# Patient Record
Sex: MALE | Race: WHITE | NOT HISPANIC OR LATINO | Employment: FULL TIME | URBAN - METROPOLITAN AREA
[De-identification: names, ages, dates, MRNs, and addresses within clinical notes are randomized per-mention and may not be internally consistent; named-entity substitution may affect disease eponyms.]

---

## 2020-09-17 ENCOUNTER — TRANSCRIBE ORDERS (OUTPATIENT)
Dept: ADMINISTRATIVE | Facility: HOSPITAL | Age: 70
End: 2020-09-17

## 2020-09-25 ENCOUNTER — HOSPITAL ENCOUNTER (OUTPATIENT)
Dept: INTERVENTIONAL RADIOLOGY/VASCULAR | Facility: HOSPITAL | Age: 70
Discharge: HOME | End: 2020-09-25
Admitting: RADIOLOGY

## 2020-09-25 VITALS — HEIGHT: 61 IN | BODY MASS INDEX: 33.99 KG/M2 | WEIGHT: 180 LBS

## 2020-09-25 DIAGNOSIS — R59.0 INGUINAL LYMPHADENOPATHY: ICD-10-CM

## 2020-09-25 PROCEDURE — 88333 PATH CONSLTJ SURG CYTO XM 1: CPT | Performed by: FAMILY MEDICINE

## 2020-09-25 PROCEDURE — 88307 TISSUE EXAM BY PATHOLOGIST: CPT | Performed by: FAMILY MEDICINE

## 2020-09-25 PROCEDURE — 76942 ECHO GUIDE FOR BIOPSY: CPT

## 2020-09-25 PROCEDURE — 25010000003 LIDOCAINE 1 % SOLUTION: Performed by: RADIOLOGY

## 2020-09-25 RX ORDER — LISINOPRIL 10 MG/1
10 TABLET ORAL DAILY
COMMUNITY
Start: 2020-09-04 | End: 2022-02-13

## 2020-09-25 RX ORDER — FENOFIBRATE 145 MG/1
145 TABLET, COATED ORAL DAILY
COMMUNITY
Start: 2020-09-10

## 2020-09-25 RX ORDER — LIDOCAINE HYDROCHLORIDE 10 MG/ML
INJECTION, SOLUTION INFILTRATION; PERINEURAL
Status: COMPLETED | OUTPATIENT
Start: 2020-09-25 | End: 2020-09-25

## 2020-09-25 RX ADMIN — LIDOCAINE HYDROCHLORIDE 6 ML: 10 INJECTION, SOLUTION INFILTRATION; PERINEURAL at 13:28

## 2020-09-25 NOTE — NURSING NOTE
Pt ambulated into IR lab D for lymph node biopsy in left groin. Pt placed himself onto stretcher in supine position, with HOB approx 20 degrees.

## 2020-09-25 NOTE — NURSING NOTE
Pt changed back into his clothing and was educated on his discharge instructions. Pt was provided paper copy to take home with him today. Pt voiced understanding of all discharge instructions.

## 2020-09-25 NOTE — NURSING NOTE
Local dressing of sterile gauze, betadine, and tegaderm applied to left groin. Dressing is dry and intact.

## 2020-09-25 NOTE — NURSING NOTE
Left groin was draped in sterile fashion and Dr. Martinez is using ultrasound guidance to numb area on left groin for biopsy.

## 2020-09-25 NOTE — NURSING NOTE
Pathologist called to IR lab D and Dr. Martinez is beginning to place guide needle, using ultrasound guidance.

## 2020-09-25 NOTE — NURSING NOTE
Dr. Martinez obtained initial core left groin lymph node biopsy sample for pathologist and placed it in formulin.

## 2020-09-25 NOTE — DISCHARGE INSTRUCTIONS
Needle Biopsy, Care After  These instructions tell you how to care for yourself after your procedure. Your doctor may also give you more specific instructions. Call your doctor if you have any problems or questions.  What can I expect after the procedure?  After the procedure, it is common to have:  · Soreness.  · Bruising.  · Mild pain.  Follow these instructions at home:    · Return to your normal activities after 48 hours.   · Take over-the-counter and prescription medicines as directed.  · Wash your hands with soap and water before you change your bandage (dressing). If you cannot use soap and water, use hand .  · Follow instructions from your doctor about:  ? How to take care of your puncture site.  ? When and how to change your bandage.  ? When to remove your bandage.  ***Leave dressing clean, dry and in place for the next 48 hours, then you may remove and shower as usual. If not completely healed closed, apply new bandaid every other day until healed closed. *** You may apply an ice pack 20 minutes on, then 20 minutes off for any discomfort.   · Check your puncture site every day for signs of infection. Watch for:  ? Redness, swelling, or pain.  ? Fluid or blood.   ? Pus or a bad smell.  ? Warmth.  · Do not take baths, swim, or use a hot tub for the next 48 hours.    · Keep all follow-up visits as told by your doctor. This is important.  Contact a doctor if you have:  · A fever.  · Redness, swelling, or pain at the puncture site, and it lasts longer than a few days.  · Fluid, blood, or pus coming from the puncture site.  · Warmth coming from the puncture site.  Get help right away if:  · You have a lot of bleeding from the puncture site.  Summary  · After the procedure, it is common to have soreness, bruising, or mild pain at the puncture site.  · Check your puncture site every day for signs of infection, such as redness, swelling, or pain.  · Get help right away if you have severe bleeding from your  puncture site.  This information is not intended to replace advice given to you by your health care provider. Make sure you discuss any questions you have with your health care provider.  Document Released: 11/30/2009 Document Revised: 12/31/2018 Document Reviewed: 12/31/2018  Elsevier Patient Education © 2020 [employer]

## 2020-09-25 NOTE — NURSING NOTE
Pt was discharged home in stable condition on room air. Pt ambulated from dept without difficulty and met his wife in the waiting room.

## 2020-10-01 LAB
LAB AP CASE REPORT: NORMAL
LAB AP DIAGNOSIS COMMENT: NORMAL
LAB AP FLOW CYTOMETRY SUMMARY: NORMAL
LAB AP INTEGRATED ONCOLOGY, ADDENDUM: NORMAL
Lab: NORMAL
PATH REPORT.FINAL DX SPEC: NORMAL
PATH REPORT.GROSS SPEC: NORMAL

## 2020-10-24 ENCOUNTER — TRANSCRIBE ORDERS (OUTPATIENT)
Dept: ADMINISTRATIVE | Facility: HOSPITAL | Age: 70
End: 2020-10-24

## 2020-10-24 ENCOUNTER — LAB (OUTPATIENT)
Dept: LAB | Facility: HOSPITAL | Age: 70
End: 2020-10-24

## 2020-10-24 ENCOUNTER — HOSPITAL ENCOUNTER (OUTPATIENT)
Dept: CARDIOLOGY | Facility: HOSPITAL | Age: 70
Discharge: HOME | End: 2020-10-24

## 2020-10-24 DIAGNOSIS — C85.95 NON-HODGKIN'S LYMPHOMA OF INGUINAL REGION (HCC): ICD-10-CM

## 2020-10-24 DIAGNOSIS — C85.95 NON-HODGKIN'S LYMPHOMA OF INGUINAL REGION (HCC): Primary | ICD-10-CM

## 2020-10-24 LAB
ANION GAP SERPL CALCULATED.3IONS-SCNC: 9.3 MMOL/L (ref 5–15)
BUN SERPL-MCNC: 28 MG/DL (ref 8–23)
BUN/CREAT SERPL: 21.5 (ref 7–25)
CALCIUM SPEC-SCNC: 9.8 MG/DL (ref 8.6–10.5)
CHLORIDE SERPL-SCNC: 106 MMOL/L (ref 98–107)
CO2 SERPL-SCNC: 26.7 MMOL/L (ref 22–29)
CREAT SERPL-MCNC: 1.3 MG/DL (ref 0.76–1.27)
GFR SERPL CREATININE-BSD FRML MDRD: 55 ML/MIN/1.73
GLUCOSE SERPL-MCNC: 98 MG/DL (ref 65–99)
POTASSIUM SERPL-SCNC: 5.7 MMOL/L (ref 3.5–5.2)
SODIUM SERPL-SCNC: 142 MMOL/L (ref 136–145)

## 2020-10-24 PROCEDURE — 93010 ELECTROCARDIOGRAM REPORT: CPT | Performed by: INTERNAL MEDICINE

## 2020-10-24 PROCEDURE — 36415 COLL VENOUS BLD VENIPUNCTURE: CPT

## 2020-10-24 PROCEDURE — 93005 ELECTROCARDIOGRAM TRACING: CPT | Performed by: ANESTHESIOLOGY

## 2020-10-24 PROCEDURE — 80048 BASIC METABOLIC PNL TOTAL CA: CPT

## 2021-11-19 ENCOUNTER — TRANSCRIBE ORDERS (OUTPATIENT)
Dept: ADMINISTRATIVE | Facility: HOSPITAL | Age: 71
End: 2021-11-19

## 2021-11-19 DIAGNOSIS — I10 HYPERTENSION, ESSENTIAL: ICD-10-CM

## 2021-11-19 DIAGNOSIS — R80.9 PROTEINURIA, UNSPECIFIED TYPE: Primary | ICD-10-CM

## 2021-11-19 DIAGNOSIS — N18.32 CHRONIC KIDNEY DISEASE (CKD) STAGE G3B/A1, MODERATELY DECREASED GLOMERULAR FILTRATION RATE (GFR) BETWEEN 30-44 ML/MIN/1.73 SQUARE METER AND ALBUMINURIA CREATININE RATIO LESS THAN 30 MG/G (CMS/H* (HCC): ICD-10-CM

## 2021-12-06 ENCOUNTER — HOSPITAL ENCOUNTER (OUTPATIENT)
Dept: ULTRASOUND IMAGING | Facility: HOSPITAL | Age: 71
Discharge: HOME | End: 2021-12-06

## 2021-12-06 ENCOUNTER — LAB (OUTPATIENT)
Dept: LAB | Facility: HOSPITAL | Age: 71
End: 2021-12-06

## 2021-12-06 ENCOUNTER — TRANSCRIBE ORDERS (OUTPATIENT)
Dept: ADMINISTRATIVE | Facility: HOSPITAL | Age: 71
End: 2021-12-06

## 2021-12-06 DIAGNOSIS — N18.30 STAGE 3 CHRONIC KIDNEY DISEASE, UNSPECIFIED WHETHER STAGE 3A OR 3B CKD (HCC): Primary | ICD-10-CM

## 2021-12-06 DIAGNOSIS — I10 HYPERTENSION, ESSENTIAL: ICD-10-CM

## 2021-12-06 DIAGNOSIS — N18.30 STAGE 3 CHRONIC KIDNEY DISEASE, UNSPECIFIED WHETHER STAGE 3A OR 3B CKD (HCC): ICD-10-CM

## 2021-12-06 DIAGNOSIS — N18.32 CHRONIC KIDNEY DISEASE (CKD) STAGE G3B/A1, MODERATELY DECREASED GLOMERULAR FILTRATION RATE (GFR) BETWEEN 30-44 ML/MIN/1.73 SQUARE METER AND ALBUMINURIA CREATININE RATIO LESS THAN 30 MG/G (CMS/H* (HCC): ICD-10-CM

## 2021-12-06 DIAGNOSIS — E55.9 VITAMIN D DEFICIENCY, UNSPECIFIED: ICD-10-CM

## 2021-12-06 DIAGNOSIS — R80.9 PROTEINURIA, UNSPECIFIED TYPE: ICD-10-CM

## 2021-12-06 DIAGNOSIS — E03.9 HYPOTHYROIDISM, UNSPECIFIED TYPE: ICD-10-CM

## 2021-12-06 LAB
25(OH)D3 SERPL-MCNC: 46.8 NG/ML
ALBUMIN SERPL-MCNC: 4.7 G/DL (ref 3.5–5.2)
ALBUMIN/GLOB SERPL: 2 G/DL
ALP SERPL-CCNC: 43 U/L (ref 39–117)
ALT SERPL W P-5'-P-CCNC: 25 U/L (ref 1–41)
ANION GAP SERPL CALCULATED.3IONS-SCNC: 12.2 MMOL/L (ref 5–15)
AST SERPL-CCNC: 26 U/L (ref 1–40)
BACTERIA UR QL AUTO: NORMAL /HPF
BASOPHILS # BLD AUTO: 0.02 10*3/MM3 (ref 0–0.2)
BASOPHILS NFR BLD AUTO: 0.5 % (ref 0–1.5)
BILIRUB SERPL-MCNC: 0.3 MG/DL (ref 0–1.2)
BILIRUB UR QL STRIP: NEGATIVE
BUN SERPL-MCNC: 18 MG/DL (ref 8–23)
BUN/CREAT SERPL: 13.7 (ref 7–25)
CALCIUM SPEC-SCNC: 9.9 MG/DL (ref 8.6–10.5)
CHLORIDE SERPL-SCNC: 103 MMOL/L (ref 98–107)
CK SERPL-CCNC: 99 U/L (ref 20–200)
CLARITY UR: CLEAR
CO2 SERPL-SCNC: 26.8 MMOL/L (ref 22–29)
COLOR UR: YELLOW
CREAT SERPL-MCNC: 1.31 MG/DL (ref 0.76–1.27)
CREAT UR-MCNC: 32.6 MG/DL
DEPRECATED RDW RBC AUTO: 41.3 FL (ref 37–54)
EOSINOPHIL # BLD AUTO: 0.08 10*3/MM3 (ref 0–0.4)
EOSINOPHIL NFR BLD AUTO: 2 % (ref 0.3–6.2)
ERYTHROCYTE [DISTWIDTH] IN BLOOD BY AUTOMATED COUNT: 12.5 % (ref 12.3–15.4)
GFR SERPL CREATININE-BSD FRML MDRD: 54 ML/MIN/1.73
GLOBULIN UR ELPH-MCNC: 2.4 GM/DL
GLUCOSE SERPL-MCNC: 83 MG/DL (ref 65–99)
GLUCOSE UR STRIP-MCNC: NEGATIVE MG/DL
HCT VFR BLD AUTO: 34.6 % (ref 37.5–51)
HGB BLD-MCNC: 11.2 G/DL (ref 13–17.7)
HGB UR QL STRIP.AUTO: ABNORMAL
HYALINE CASTS UR QL AUTO: NORMAL /LPF
IMM GRANULOCYTES # BLD AUTO: 0.05 10*3/MM3 (ref 0–0.05)
IMM GRANULOCYTES NFR BLD AUTO: 1.3 % (ref 0–0.5)
KETONES UR QL STRIP: NEGATIVE
LEUKOCYTE ESTERASE UR QL STRIP.AUTO: NEGATIVE
LYMPHOCYTES # BLD AUTO: 0.29 10*3/MM3 (ref 0.7–3.1)
LYMPHOCYTES NFR BLD AUTO: 7.4 % (ref 19.6–45.3)
MCH RBC QN AUTO: 29.6 PG (ref 26.6–33)
MCHC RBC AUTO-ENTMCNC: 32.4 G/DL (ref 31.5–35.7)
MCV RBC AUTO: 91.3 FL (ref 79–97)
MONOCYTES # BLD AUTO: 0.69 10*3/MM3 (ref 0.1–0.9)
MONOCYTES NFR BLD AUTO: 17.6 % (ref 5–12)
NEUTROPHILS NFR BLD AUTO: 2.8 10*3/MM3 (ref 1.7–7)
NEUTROPHILS NFR BLD AUTO: 71.2 % (ref 42.7–76)
NITRITE UR QL STRIP: NEGATIVE
NRBC BLD AUTO-RTO: 0 /100 WBC (ref 0–0.2)
PH UR STRIP.AUTO: 6.5 [PH] (ref 5–8)
PHOSPHATE SERPL-MCNC: 3.5 MG/DL (ref 2.5–4.5)
PLATELET # BLD AUTO: 269 10*3/MM3 (ref 140–450)
PMV BLD AUTO: 9.8 FL (ref 6–12)
POTASSIUM SERPL-SCNC: 4 MMOL/L (ref 3.5–5.2)
PROT ?TM UR-MCNC: <4 MG/DL
PROT SERPL-MCNC: 7.1 G/DL (ref 6–8.5)
PROT UR QL STRIP: NEGATIVE
PROT/CREAT UR: NORMAL MG/G{CREAT}
PTH-INTACT SERPL-MCNC: 25.4 PG/ML (ref 15–65)
RBC # BLD AUTO: 3.79 10*6/MM3 (ref 4.14–5.8)
RBC # UR STRIP: NORMAL /HPF
REF LAB TEST METHOD: NORMAL
SODIUM SERPL-SCNC: 142 MMOL/L (ref 136–145)
SP GR UR STRIP: 1.01 (ref 1–1.03)
SQUAMOUS #/AREA URNS HPF: NORMAL /HPF
TSH SERPL DL<=0.05 MIU/L-ACNC: 2.38 UIU/ML (ref 0.27–4.2)
URATE SERPL-MCNC: 5.7 MG/DL (ref 3.4–7)
UROBILINOGEN UR QL STRIP: ABNORMAL
WBC # UR STRIP: NORMAL /HPF
WBC NRBC COR # BLD: 3.93 10*3/MM3 (ref 3.4–10.8)

## 2021-12-06 PROCEDURE — 86038 ANTINUCLEAR ANTIBODIES: CPT

## 2021-12-06 PROCEDURE — 83970 ASSAY OF PARATHORMONE: CPT

## 2021-12-06 PROCEDURE — 76775 US EXAM ABDO BACK WALL LIM: CPT

## 2021-12-06 PROCEDURE — 82306 VITAMIN D 25 HYDROXY: CPT

## 2021-12-06 PROCEDURE — 80053 COMPREHEN METABOLIC PANEL: CPT

## 2021-12-06 PROCEDURE — 84550 ASSAY OF BLOOD/URIC ACID: CPT

## 2021-12-06 PROCEDURE — 82570 ASSAY OF URINE CREATININE: CPT

## 2021-12-06 PROCEDURE — 84100 ASSAY OF PHOSPHORUS: CPT

## 2021-12-06 PROCEDURE — 36415 COLL VENOUS BLD VENIPUNCTURE: CPT

## 2021-12-06 PROCEDURE — 82550 ASSAY OF CK (CPK): CPT

## 2021-12-06 PROCEDURE — 86334 IMMUNOFIX E-PHORESIS SERUM: CPT

## 2021-12-06 PROCEDURE — 82784 ASSAY IGA/IGD/IGG/IGM EACH: CPT

## 2021-12-06 PROCEDURE — 84443 ASSAY THYROID STIM HORMONE: CPT

## 2021-12-06 PROCEDURE — 84156 ASSAY OF PROTEIN URINE: CPT

## 2021-12-06 PROCEDURE — 81001 URINALYSIS AUTO W/SCOPE: CPT

## 2021-12-06 PROCEDURE — 85025 COMPLETE CBC W/AUTO DIFF WBC: CPT

## 2021-12-07 LAB
IGA SERPL-MCNC: 40 MG/DL (ref 61–437)
IGG SERPL-MCNC: 599 MG/DL (ref 603–1613)
IGM SERPL-MCNC: 12 MG/DL (ref 20–172)
PROT PATTERN SERPL IFE-IMP: ABNORMAL

## 2021-12-08 LAB — ANA SER QL: NEGATIVE

## 2022-02-13 ENCOUNTER — APPOINTMENT (OUTPATIENT)
Dept: GENERAL RADIOLOGY | Facility: HOSPITAL | Age: 72
End: 2022-02-13

## 2022-02-13 ENCOUNTER — HOSPITAL ENCOUNTER (INPATIENT)
Facility: HOSPITAL | Age: 72
LOS: 3 days | Discharge: HOME | End: 2022-02-16
Attending: EMERGENCY MEDICINE | Admitting: INTERNAL MEDICINE

## 2022-02-13 DIAGNOSIS — J12.82 PNEUMONIA DUE TO COVID-19 VIRUS: ICD-10-CM

## 2022-02-13 DIAGNOSIS — R06.03 ACUTE RESPIRATORY DISTRESS: Primary | ICD-10-CM

## 2022-02-13 DIAGNOSIS — U07.1 PNEUMONIA DUE TO COVID-19 VIRUS: ICD-10-CM

## 2022-02-13 DIAGNOSIS — R09.02 HYPOXIA: ICD-10-CM

## 2022-02-13 LAB
ALBUMIN SERPL-MCNC: 3.2 G/DL (ref 3.5–5.2)
ALBUMIN SERPL-MCNC: 3.2 G/DL (ref 3.5–5.2)
ALBUMIN/GLOB SERPL: 1.1 G/DL
ALP SERPL-CCNC: 35 U/L (ref 39–117)
ALP SERPL-CCNC: 38 U/L (ref 39–117)
ALT SERPL W P-5'-P-CCNC: 24 U/L (ref 1–41)
ALT SERPL W P-5'-P-CCNC: 28 U/L (ref 1–41)
ANION GAP SERPL CALCULATED.3IONS-SCNC: 12 MMOL/L (ref 5–15)
AST SERPL-CCNC: 38 U/L (ref 1–40)
AST SERPL-CCNC: 41 U/L (ref 1–40)
BACTERIA UR QL AUTO: ABNORMAL /HPF
BASOPHILS # BLD AUTO: 0 10*3/MM3 (ref 0–0.2)
BASOPHILS NFR BLD AUTO: 0.1 % (ref 0–1.5)
BILIRUB CONJ SERPL-MCNC: 0.2 MG/DL (ref 0–0.3)
BILIRUB INDIRECT SERPL-MCNC: 0.1 MG/DL
BILIRUB SERPL-MCNC: 0.3 MG/DL (ref 0–1.2)
BILIRUB SERPL-MCNC: 0.3 MG/DL (ref 0–1.2)
BILIRUB UR QL STRIP: NEGATIVE
BUN SERPL-MCNC: 17 MG/DL (ref 8–23)
BUN/CREAT SERPL: 14.4 (ref 7–25)
CALCIUM SPEC-SCNC: 9 MG/DL (ref 8.6–10.5)
CHLORIDE SERPL-SCNC: 104 MMOL/L (ref 98–107)
CLARITY UR: CLEAR
CO2 SERPL-SCNC: 23 MMOL/L (ref 22–29)
COLOR UR: YELLOW
CREAT SERPL-MCNC: 1.03 MG/DL (ref 0.76–1.27)
CREAT SERPL-MCNC: 1.18 MG/DL (ref 0.76–1.27)
D-LACTATE SERPL-SCNC: 0.9 MMOL/L (ref 0.5–2)
DEPRECATED RDW RBC AUTO: 38.9 FL (ref 37–54)
EOSINOPHIL # BLD AUTO: 0 10*3/MM3 (ref 0–0.4)
EOSINOPHIL NFR BLD AUTO: 0.4 % (ref 0.3–6.2)
ERYTHROCYTE [DISTWIDTH] IN BLOOD BY AUTOMATED COUNT: 13.1 % (ref 12.3–15.4)
GFR SERPL CREATININE-BSD FRML MDRD: 61 ML/MIN/1.73
GFR SERPL CREATININE-BSD FRML MDRD: 72 ML/MIN/1.73
GLOBULIN UR ELPH-MCNC: 2.8 GM/DL
GLUCOSE SERPL-MCNC: 108 MG/DL (ref 65–99)
GLUCOSE UR STRIP-MCNC: NEGATIVE MG/DL
HCT VFR BLD AUTO: 28.6 % (ref 37.5–51)
HGB BLD-MCNC: 9.7 G/DL (ref 13–17.7)
HGB UR QL STRIP.AUTO: ABNORMAL
HYALINE CASTS UR QL AUTO: ABNORMAL /LPF
KETONES UR QL STRIP: NEGATIVE
LEUKOCYTE ESTERASE UR QL STRIP.AUTO: NEGATIVE
LYMPHOCYTES # BLD AUTO: 0.1 10*3/MM3 (ref 0.7–3.1)
LYMPHOCYTES NFR BLD AUTO: 4.3 % (ref 19.6–45.3)
MCH RBC QN AUTO: 28.7 PG (ref 26.6–33)
MCHC RBC AUTO-ENTMCNC: 34.1 G/DL (ref 31.5–35.7)
MCV RBC AUTO: 84.1 FL (ref 79–97)
MONOCYTES # BLD AUTO: 0.3 10*3/MM3 (ref 0.1–0.9)
MONOCYTES NFR BLD AUTO: 10.9 % (ref 5–12)
NEUTROPHILS NFR BLD AUTO: 2.3 10*3/MM3 (ref 1.7–7)
NEUTROPHILS NFR BLD AUTO: 84.3 % (ref 42.7–76)
NITRITE UR QL STRIP: NEGATIVE
NRBC BLD AUTO-RTO: 0 /100 WBC (ref 0–0.2)
PH UR STRIP.AUTO: 6.5 [PH] (ref 5–8)
PLATELET # BLD AUTO: 267 10*3/MM3 (ref 140–450)
PMV BLD AUTO: 7.3 FL (ref 6–12)
POTASSIUM SERPL-SCNC: 3.3 MMOL/L (ref 3.5–5.2)
PROT SERPL-MCNC: 5.8 G/DL (ref 6–8.5)
PROT SERPL-MCNC: 6 G/DL (ref 6–8.5)
PROT UR QL STRIP: ABNORMAL
RBC # BLD AUTO: 3.4 10*6/MM3 (ref 4.14–5.8)
RBC # UR STRIP: ABNORMAL /HPF
REF LAB TEST METHOD: ABNORMAL
SODIUM SERPL-SCNC: 139 MMOL/L (ref 136–145)
SP GR UR STRIP: 1.02 (ref 1–1.03)
SQUAMOUS #/AREA URNS HPF: ABNORMAL /HPF
TROPONIN T SERPL-MCNC: <0.01 NG/ML (ref 0–0.03)
UROBILINOGEN UR QL STRIP: ABNORMAL
WBC # UR STRIP: ABNORMAL /HPF
WBC NRBC COR # BLD: 2.8 10*3/MM3 (ref 3.4–10.8)

## 2022-02-13 PROCEDURE — XW033E5 INTRODUCTION OF REMDESIVIR ANTI-INFECTIVE INTO PERIPHERAL VEIN, PERCUTANEOUS APPROACH, NEW TECHNOLOGY GROUP 5: ICD-10-PCS | Performed by: INTERNAL MEDICINE

## 2022-02-13 PROCEDURE — 81001 URINALYSIS AUTO W/SCOPE: CPT | Performed by: EMERGENCY MEDICINE

## 2022-02-13 PROCEDURE — 25010000002 DEXAMETHASONE PER 1 MG: Performed by: EMERGENCY MEDICINE

## 2022-02-13 PROCEDURE — 80053 COMPREHEN METABOLIC PANEL: CPT | Performed by: EMERGENCY MEDICINE

## 2022-02-13 PROCEDURE — 71045 X-RAY EXAM CHEST 1 VIEW: CPT

## 2022-02-13 PROCEDURE — 99284 EMERGENCY DEPT VISIT MOD MDM: CPT

## 2022-02-13 PROCEDURE — 85025 COMPLETE CBC W/AUTO DIFF WBC: CPT | Performed by: EMERGENCY MEDICINE

## 2022-02-13 PROCEDURE — 87040 BLOOD CULTURE FOR BACTERIA: CPT | Performed by: EMERGENCY MEDICINE

## 2022-02-13 PROCEDURE — 25010000002 ENOXAPARIN PER 10 MG: Performed by: INTERNAL MEDICINE

## 2022-02-13 PROCEDURE — 82565 ASSAY OF CREATININE: CPT | Performed by: INTERNAL MEDICINE

## 2022-02-13 PROCEDURE — 93005 ELECTROCARDIOGRAM TRACING: CPT | Performed by: EMERGENCY MEDICINE

## 2022-02-13 PROCEDURE — 25010000002 REMDESIVIR 100 MG RECONSTITUTED SOLUTION: Performed by: INTERNAL MEDICINE

## 2022-02-13 PROCEDURE — 84484 ASSAY OF TROPONIN QUANT: CPT | Performed by: EMERGENCY MEDICINE

## 2022-02-13 PROCEDURE — 82248 BILIRUBIN DIRECT: CPT | Performed by: EMERGENCY MEDICINE

## 2022-02-13 PROCEDURE — 83605 ASSAY OF LACTIC ACID: CPT

## 2022-02-13 PROCEDURE — 36415 COLL VENOUS BLD VENIPUNCTURE: CPT | Performed by: INTERNAL MEDICINE

## 2022-02-13 RX ORDER — ACETAMINOPHEN 325 MG/1
650 TABLET ORAL EVERY 4 HOURS PRN
Status: DISCONTINUED | OUTPATIENT
Start: 2022-02-13 | End: 2022-02-16 | Stop reason: HOSPADM

## 2022-02-13 RX ORDER — POTASSIUM CHLORIDE 20 MEQ/1
40 TABLET, EXTENDED RELEASE ORAL AS NEEDED
Status: DISCONTINUED | OUTPATIENT
Start: 2022-02-13 | End: 2022-02-16 | Stop reason: HOSPADM

## 2022-02-13 RX ORDER — FINASTERIDE 5 MG/1
5 TABLET, FILM COATED ORAL NIGHTLY
COMMUNITY

## 2022-02-13 RX ORDER — SODIUM CHLORIDE 0.9 % (FLUSH) 0.9 %
3 SYRINGE (ML) INJECTION EVERY 12 HOURS SCHEDULED
Status: DISCONTINUED | OUTPATIENT
Start: 2022-02-13 | End: 2022-02-16 | Stop reason: HOSPADM

## 2022-02-13 RX ORDER — HYDRALAZINE HYDROCHLORIDE 25 MG/1
25 TABLET, FILM COATED ORAL 2 TIMES DAILY
COMMUNITY
Start: 2021-11-17

## 2022-02-13 RX ORDER — LISINOPRIL 5 MG/1
10 TABLET ORAL DAILY
Status: DISCONTINUED | OUTPATIENT
Start: 2022-02-13 | End: 2022-02-13

## 2022-02-13 RX ORDER — SODIUM CHLORIDE 0.9 % (FLUSH) 0.9 %
3-10 SYRINGE (ML) INJECTION AS NEEDED
Status: DISCONTINUED | OUTPATIENT
Start: 2022-02-13 | End: 2022-02-16 | Stop reason: HOSPADM

## 2022-02-13 RX ORDER — ACETAMINOPHEN 500 MG
1000 TABLET ORAL ONCE
Status: COMPLETED | OUTPATIENT
Start: 2022-02-13 | End: 2022-02-13

## 2022-02-13 RX ORDER — BUDESONIDE AND FORMOTEROL FUMARATE DIHYDRATE 160; 4.5 UG/1; UG/1
2 AEROSOL RESPIRATORY (INHALATION)
Status: DISCONTINUED | OUTPATIENT
Start: 2022-02-13 | End: 2022-02-16 | Stop reason: HOSPADM

## 2022-02-13 RX ORDER — POTASSIUM CHLORIDE 1.5 G/1.77G
40 POWDER, FOR SOLUTION ORAL AS NEEDED
Status: DISCONTINUED | OUTPATIENT
Start: 2022-02-13 | End: 2022-02-16 | Stop reason: HOSPADM

## 2022-02-13 RX ORDER — ONDANSETRON 2 MG/ML
4 INJECTION INTRAMUSCULAR; INTRAVENOUS EVERY 6 HOURS PRN
Status: DISCONTINUED | OUTPATIENT
Start: 2022-02-13 | End: 2022-02-16 | Stop reason: HOSPADM

## 2022-02-13 RX ORDER — FAMOTIDINE 20 MG/1
40 TABLET, FILM COATED ORAL DAILY
Status: DISCONTINUED | OUTPATIENT
Start: 2022-02-13 | End: 2022-02-14

## 2022-02-13 RX ORDER — GUAIFENESIN 600 MG/1
600 TABLET, EXTENDED RELEASE ORAL EVERY 12 HOURS SCHEDULED
Status: DISCONTINUED | OUTPATIENT
Start: 2022-02-13 | End: 2022-02-16 | Stop reason: HOSPADM

## 2022-02-13 RX ORDER — ZINC SULFATE 50(220)MG
220 CAPSULE ORAL DAILY
Status: DISCONTINUED | OUTPATIENT
Start: 2022-02-13 | End: 2022-02-16 | Stop reason: HOSPADM

## 2022-02-13 RX ORDER — DEXAMETHASONE 6 MG/1
6 TABLET ORAL
Status: DISCONTINUED | OUTPATIENT
Start: 2022-02-14 | End: 2022-02-15

## 2022-02-13 RX ORDER — ALBUTEROL SULFATE 90 UG/1
2 AEROSOL, METERED RESPIRATORY (INHALATION)
Status: DISCONTINUED | OUTPATIENT
Start: 2022-02-13 | End: 2022-02-16 | Stop reason: HOSPADM

## 2022-02-13 RX ORDER — DEXAMETHASONE SODIUM PHOSPHATE 4 MG/ML
6 INJECTION, SOLUTION INTRA-ARTICULAR; INTRALESIONAL; INTRAMUSCULAR; INTRAVENOUS; SOFT TISSUE ONCE
Status: COMPLETED | OUTPATIENT
Start: 2022-02-13 | End: 2022-02-13

## 2022-02-13 RX ORDER — SODIUM CHLORIDE 0.9 % (FLUSH) 0.9 %
10 SYRINGE (ML) INJECTION AS NEEDED
Status: DISCONTINUED | OUTPATIENT
Start: 2022-02-13 | End: 2022-02-16 | Stop reason: HOSPADM

## 2022-02-13 RX ORDER — NITROGLYCERIN 0.4 MG/1
0.4 TABLET SUBLINGUAL
Status: DISCONTINUED | OUTPATIENT
Start: 2022-02-13 | End: 2022-02-16 | Stop reason: HOSPADM

## 2022-02-13 RX ORDER — TAMSULOSIN HYDROCHLORIDE 0.4 MG/1
CAPSULE ORAL
COMMUNITY
Start: 2021-11-17 | End: 2022-02-13

## 2022-02-13 RX ADMIN — ENOXAPARIN SODIUM 40 MG: 40 INJECTION SUBCUTANEOUS at 17:57

## 2022-02-13 RX ADMIN — ZINC SULFATE 220 MG (50 MG) CAPSULE 220 MG: CAPSULE at 22:05

## 2022-02-13 RX ADMIN — POTASSIUM CHLORIDE 40 MEQ: 1500 TABLET, EXTENDED RELEASE ORAL at 17:57

## 2022-02-13 RX ADMIN — SODIUM CHLORIDE 1000 ML: 9 INJECTION, SOLUTION INTRAVENOUS at 15:48

## 2022-02-13 RX ADMIN — DEXAMETHASONE SODIUM PHOSPHATE 6 MG: 4 INJECTION, SOLUTION INTRAMUSCULAR; INTRAVENOUS at 16:32

## 2022-02-13 RX ADMIN — FAMOTIDINE 40 MG: 20 TABLET ORAL at 17:58

## 2022-02-13 RX ADMIN — Medication 3 ML: at 23:22

## 2022-02-13 RX ADMIN — ACETAMINOPHEN 1000 MG: 500 TABLET, FILM COATED ORAL at 15:48

## 2022-02-13 RX ADMIN — GUAIFENESIN 600 MG: 600 TABLET, EXTENDED RELEASE ORAL at 22:05

## 2022-02-13 RX ADMIN — REMDESIVIR 200 MG: 100 INJECTION, POWDER, LYOPHILIZED, FOR SOLUTION INTRAVENOUS at 17:58

## 2022-02-13 NOTE — ED PROVIDER NOTES
Subjective   Chief complaint Short of breath Covid positive    History of present is a 71-year-old male with history of non-Hodgkin's lymphoma who states that he tested positive for Covid this past Wednesday and he has been having some cough congestion fever chills and increasing shortness of breath since that time.  He denies any chest pain he is got body aches.  No vomiting or diarrhea.  Patient is vaccinated including a booster.  Symptoms are severe worse with exertion better with rest ongoing continues since Wednesday.  No other complaints at this time           Review of Systems   Constitutional: Positive for chills and fever.   HENT: Positive for congestion. Negative for sinus pressure.    Eyes: Negative for photophobia and visual disturbance.   Respiratory: Positive for cough and shortness of breath. Negative for chest tightness.    Cardiovascular: Negative for chest pain and palpitations.   Gastrointestinal: Negative for abdominal pain and vomiting.   Endocrine: Negative for cold intolerance and heat intolerance.   Genitourinary: Negative for difficulty urinating and dysuria.   Musculoskeletal: Positive for myalgias. Negative for arthralgias.   Skin: Negative for color change and rash.   Neurological: Positive for weakness. Negative for dizziness.   Psychiatric/Behavioral: Negative for agitation and behavioral problems.       Past Medical History:   Diagnosis Date   • Hyperlipidemia    • Hypertension      Non-Hodgkin's lymphoma  No Known Allergies    Past Surgical History:   Procedure Laterality Date   • APPENDECTOMY     • US GUIDED LYMPH NODE BIOPSY  9/25/2020   • VASECTOMY Bilateral        No family history on file.    Social History     Socioeconomic History   • Marital status:    Tobacco Use   • Smoking status: Never Smoker   Substance and Sexual Activity   • Alcohol use: Yes   • Drug use: Never   • Sexual activity: Defer     Prior to Admission medications    Medication Sig Start Date End Date  Taking? Authorizing Provider   fenofibrate (TRICOR) 145 MG tablet Take 1 tablet by mouth Daily. 9/10/20   Rocael Robbins MD   lisinopril (PRINIVIL,ZESTRIL) 10 MG tablet Take 10 mg by mouth Daily. 9/4/20   Roceal Robbins MD           Objective   Physical Exam  71-year-old male awake alert moderate distress but nontoxic-appearing room air saturations anywhere between 88% and 90% on room air.  Patient placed on 2 L nasal cannula improved sats up to 98%.  HEENT extraocular muscles intact pupils equal round reactive sclera clear mouth clear neck supple no adenopathy no JVD no meningeal signs lungs diffuse rhonchi throughout no retraction no use of accessory heart regular slight tachycardic without murmur abdomen was soft without tenderness no other masses good bowel sounds extremities pulses equal throughout upper and lower extremities no edema cords or Homans' sign no evidence of DVT skin warm dry without rashes or cellulitic changes neurologic he is awake alert orientated x3 is no facial symmetry speech is normal he said occasionally conversation is no focal deficits.  Procedures           ED Course      Results for orders placed or performed during the hospital encounter of 02/13/22   Comprehensive Metabolic Panel    Specimen: Blood   Result Value Ref Range    Glucose 108 (H) 65 - 99 mg/dL    BUN 17 8 - 23 mg/dL    Creatinine 1.18 0.76 - 1.27 mg/dL    Sodium 139 136 - 145 mmol/L    Potassium 3.3 (L) 3.5 - 5.2 mmol/L    Chloride 104 98 - 107 mmol/L    CO2 23.0 22.0 - 29.0 mmol/L    Calcium 9.0 8.6 - 10.5 mg/dL    Total Protein 6.0 6.0 - 8.5 g/dL    Albumin 3.20 (L) 3.50 - 5.20 g/dL    ALT (SGPT) 28 1 - 41 U/L    AST (SGOT) 41 (H) 1 - 40 U/L    Alkaline Phosphatase 38 (L) 39 - 117 U/L    Total Bilirubin 0.3 0.0 - 1.2 mg/dL    eGFR Non African Amer 61 >60 mL/min/1.73    Globulin 2.8 gm/dL    A/G Ratio 1.1 g/dL    BUN/Creatinine Ratio 14.4 7.0 - 25.0    Anion Gap 12.0 5.0 - 15.0 mmol/L   Urinalysis With  Microscopic If Indicated (No Culture) - Urine, Clean Catch    Specimen: Urine, Clean Catch   Result Value Ref Range    Color, UA Yellow Yellow, Straw    Appearance, UA Clear Clear    pH, UA 6.5 5.0 - 8.0    Specific Gravity, UA 1.016 1.005 - 1.030    Glucose, UA Negative Negative    Ketones, UA Negative Negative    Bilirubin, UA Negative Negative    Blood, UA Small (1+) (A) Negative    Protein, UA 30 mg/dL (1+) (A) Negative    Leuk Esterase, UA Negative Negative    Nitrite, UA Negative Negative    Urobilinogen, UA 1.0 E.U./dL 0.2 - 1.0 E.U./dL   Troponin    Specimen: Blood   Result Value Ref Range    Troponin T <0.010 0.000 - 0.030 ng/mL   CBC Auto Differential    Specimen: Blood   Result Value Ref Range    WBC 2.80 (L) 3.40 - 10.80 10*3/mm3    RBC 3.40 (L) 4.14 - 5.80 10*6/mm3    Hemoglobin 9.7 (L) 13.0 - 17.7 g/dL    Hematocrit 28.6 (L) 37.5 - 51.0 %    MCV 84.1 79.0 - 97.0 fL    MCH 28.7 26.6 - 33.0 pg    MCHC 34.1 31.5 - 35.7 g/dL    RDW 13.1 12.3 - 15.4 %    RDW-SD 38.9 37.0 - 54.0 fl    MPV 7.3 6.0 - 12.0 fL    Platelets 267 140 - 450 10*3/mm3    Neutrophil % 84.3 (H) 42.7 - 76.0 %    Lymphocyte % 4.3 (L) 19.6 - 45.3 %    Monocyte % 10.9 5.0 - 12.0 %    Eosinophil % 0.4 0.3 - 6.2 %    Basophil % 0.1 0.0 - 1.5 %    Neutrophils, Absolute 2.30 1.70 - 7.00 10*3/mm3    Lymphocytes, Absolute 0.10 (L) 0.70 - 3.10 10*3/mm3    Monocytes, Absolute 0.30 0.10 - 0.90 10*3/mm3    Eosinophils, Absolute 0.00 0.00 - 0.40 10*3/mm3    Basophils, Absolute 0.00 0.00 - 0.20 10*3/mm3    nRBC 0.0 0.0 - 0.2 /100 WBC   Urinalysis, Microscopic Only - Urine, Clean Catch    Specimen: Urine, Clean Catch   Result Value Ref Range    RBC, UA 13-20 (A) None Seen /HPF    WBC, UA 0-2 (A) None Seen /HPF    Bacteria, UA None Seen None Seen /HPF    Squamous Epithelial Cells, UA 0-2 None Seen, 0-2 /HPF    Hyaline Casts, UA None Seen None Seen /LPF    Methodology Automated Microscopy    Hepatic Function Panel    Specimen: Blood   Result Value Ref  Range    Total Protein 5.8 (L) 6.0 - 8.5 g/dL    Albumin 3.20 (L) 3.50 - 5.20 g/dL    ALT (SGPT) 24 1 - 41 U/L    AST (SGOT) 38 1 - 40 U/L    Alkaline Phosphatase 35 (L) 39 - 117 U/L    Total Bilirubin 0.3 0.0 - 1.2 mg/dL    Bilirubin, Direct 0.2 0.0 - 0.3 mg/dL    Bilirubin, Indirect 0.1 mg/dL   Creatinine, Serum    Specimen: Blood   Result Value Ref Range    Creatinine 1.03 0.76 - 1.27 mg/dL    eGFR Non African Amer 72 >60 mL/min/1.73   POC Lactate    Specimen: Blood   Result Value Ref Range    Lactate 0.9 0.5 - 2.0 mmol/L   ECG 12 Lead   Result Value Ref Range    QT Interval 323 ms     XR Chest 1 View    Result Date: 2/13/2022  Patchy multifocal bilateral airspace disease most concerning for COVID-19 pneumonia.  Electronically Signed By-Orlando Mckee MD On:2/13/2022 3:54 PM This report was finalized on [id number] by  Orlando Mckee MD.    Medications   sodium chloride 0.9 % flush 10 mL (has no administration in time range)   Pharmacy Consult - Remdesivir (has no administration in time range)   remdesivir 200 mg in 290 mL NS (0 mg Intravenous Stopped 2/13/22 1914)     Followed by   remdesivir 100 mg in sodium chloride 0.9 % 250 mL IVPB (powder vial) (has no administration in time range)   sodium chloride 0.9 % flush 3 mL (has no administration in time range)   sodium chloride 0.9 % flush 3-10 mL (has no administration in time range)   enoxaparin (LOVENOX) syringe 40 mg (40 mg Subcutaneous Given 2/13/22 1757)   nitroglycerin (NITROSTAT) SL tablet 0.4 mg (has no administration in time range)   acetaminophen (TYLENOL) tablet 650 mg (has no administration in time range)   ondansetron (ZOFRAN) injection 4 mg (has no administration in time range)   famotidine (PEPCID) tablet 40 mg (40 mg Oral Given 2/13/22 1758)   dexamethasone (DECADRON) tablet 6 mg (has no administration in time range)   budesonide-formoterol (SYMBICORT) 160-4.5 MCG/ACT inhaler 2 puff (has no administration in time range)   albuterol sulfate HFA  (PROVENTIL HFA;VENTOLIN HFA;PROAIR HFA) inhaler 2 puff (has no administration in time range)   guaiFENesin (MUCINEX) 12 hr tablet 600 mg (has no administration in time range)   zinc sulfate (ZINCATE) capsule 220 mg (has no administration in time range)   potassium chloride (K-DUR,KLOR-CON) CR tablet 40 mEq (40 mEq Oral Given 2/13/22 1757)   potassium chloride (KLOR-CON) packet 40 mEq (has no administration in time range)   sodium chloride 0.9 % bolus 1,000 mL (0 mL Intravenous Stopped 2/13/22 1758)   acetaminophen (TYLENOL) tablet 1,000 mg (1,000 mg Oral Given 2/13/22 1548)   dexamethasone (DECADRON) injection 6 mg (6 mg Intravenous Given 2/13/22 1632)     EKG my interpretation normal sinus rhythm rate of 100 normal axis no hypertrophy QTC of 426 normal EKG                                             MDM  Number of Diagnoses or Management Options  Acute respiratory distress: new and requires workup  Hypoxia: new and requires workup  Pneumonia due to COVID-19 virus: new and requires workup  Diagnosis management comments: Medical decision making.  Patient IV established placed on a cardiac monitor given liter of saline a gram of Tylenol p.o.  The patient had a EKG which showed a sinus tachycardia about rate of 104 on my evaluation without any acute ST changes or other findings labs reviewed by me glucose was 108 otherwise chemistries unremarkable AST of 41 white count was 3.8 hemoglobin is 9.7.  The patient's cultures are pending troponin negative lactate was normal urine was negative.  Chest x-ray diffuse patchy bilateral pneumonia consistent with COVID-19.  Patient started on Decadron and remdesivir since he is hypoxic he has a history of non-Hodgkin's lymphoma he is 71 and a history of hypertension.  Patient repeat exam was made aware of the findings he is nontoxic-appearing his sats are 99% on 2 L of oxygen.  Respiratory about 20.  No evidence of sepsis on clinical exam he is nontoxic-appearing he is awake alert  carries conversation good skin turgor his abdomen soft without tenderness heart regular with a rate in the 90s.  See no evidence suggest DVT or pulmonary embolism or acute myocardial infarction or ischemia.  Dr. Valdovinos notified and the patient will be placed in the hospital for further care stable unremarkable ER course       Amount and/or Complexity of Data Reviewed  Clinical lab tests: reviewed  Tests in the radiology section of CPT®: reviewed  Discuss the patient with other providers: yes    Risk of Complications, Morbidity, and/or Mortality  Presenting problems: high  Diagnostic procedures: high  Management options: high    Patient Progress  Patient progress: stable      Final diagnoses:   Acute respiratory distress   Pneumonia due to COVID-19 virus   Hypoxia       ED Disposition  ED Disposition     ED Disposition Condition Comment    Decision to Admit  Level of Care: Med/Surg [1]   Diagnosis: Acute respiratory distress [122736]   Admitting Physician: MARYAM VALDOVINOS [[id number]]   Attending Physician: MARYAM VALDOVINOS [[id number]]   Certification: I certify that inpatient hospital services are medically necessary for greater than 2 midnights.            No follow-up provider specified.       Medication List      No changes were made to your prescriptions during this visit.          Rolando Yang MD  02/13/22 [id number]       Rolando Yang MD  02/13/22 2014

## 2022-02-14 LAB
ALBUMIN SERPL-MCNC: 3.2 G/DL (ref 3.5–5.2)
ALP SERPL-CCNC: 34 U/L (ref 39–117)
ALT SERPL W P-5'-P-CCNC: 23 U/L (ref 1–41)
ANION GAP SERPL CALCULATED.3IONS-SCNC: 10 MMOL/L (ref 5–15)
AST SERPL-CCNC: 32 U/L (ref 1–40)
BILIRUB CONJ SERPL-MCNC: <0.2 MG/DL (ref 0–0.3)
BILIRUB INDIRECT SERPL-MCNC: ABNORMAL MG/DL
BILIRUB SERPL-MCNC: 0.3 MG/DL (ref 0–1.2)
BUN SERPL-MCNC: 21 MG/DL (ref 8–23)
BUN/CREAT SERPL: 20.2 (ref 7–25)
CALCIUM SPEC-SCNC: 8.6 MG/DL (ref 8.6–10.5)
CHLORIDE SERPL-SCNC: 108 MMOL/L (ref 98–107)
CO2 SERPL-SCNC: 23 MMOL/L (ref 22–29)
CREAT SERPL-MCNC: 1.04 MG/DL (ref 0.76–1.27)
D DIMER PPP FEU-MCNC: 0.76 MG/L (FEU) (ref 0–0.59)
DEPRECATED RDW RBC AUTO: 39.8 FL (ref 37–54)
ELLIPTOCYTES BLD QL SMEAR: ABNORMAL
ERYTHROCYTE [DISTWIDTH] IN BLOOD BY AUTOMATED COUNT: 13.3 % (ref 12.3–15.4)
GFR SERPL CREATININE-BSD FRML MDRD: 71 ML/MIN/1.73
GLUCOSE SERPL-MCNC: 118 MG/DL (ref 65–99)
HCT VFR BLD AUTO: 25.7 % (ref 37.5–51)
HGB BLD-MCNC: 9.2 G/DL (ref 13–17.7)
LYMPHOCYTES # BLD MANUAL: 0.15 10*3/MM3 (ref 0.7–3.1)
LYMPHOCYTES NFR BLD MANUAL: 7 % (ref 5–12)
MCH RBC QN AUTO: 30.1 PG (ref 26.6–33)
MCHC RBC AUTO-ENTMCNC: 35.8 G/DL (ref 31.5–35.7)
MCV RBC AUTO: 84.2 FL (ref 79–97)
METAMYELOCYTES NFR BLD MANUAL: 1 % (ref 0–0)
MONOCYTES # BLD: 0.15 10*3/MM3 (ref 0.1–0.9)
NEUTROPHILS # BLD AUTO: 1.79 10*3/MM3 (ref 1.7–7)
NEUTROPHILS NFR BLD MANUAL: 84 % (ref 42.7–76)
NEUTS BAND NFR BLD MANUAL: 1 % (ref 0–5)
PLAT MORPH BLD: NORMAL
PLATELET # BLD AUTO: 240 10*3/MM3 (ref 140–450)
PMV BLD AUTO: 7.5 FL (ref 6–12)
POTASSIUM SERPL-SCNC: 4 MMOL/L (ref 3.5–5.2)
PROT SERPL-MCNC: 5.7 G/DL (ref 6–8.5)
RBC # BLD AUTO: 3.06 10*6/MM3 (ref 4.14–5.8)
SCAN SLIDE: NORMAL
SODIUM SERPL-SCNC: 141 MMOL/L (ref 136–145)
VARIANT LYMPHS NFR BLD MANUAL: 7 % (ref 19.6–45.3)
WBC MORPH BLD: NORMAL
WBC NRBC COR # BLD: 2.1 10*3/MM3 (ref 3.4–10.8)

## 2022-02-14 PROCEDURE — 80076 HEPATIC FUNCTION PANEL: CPT | Performed by: INTERNAL MEDICINE

## 2022-02-14 PROCEDURE — 94799 UNLISTED PULMONARY SVC/PX: CPT

## 2022-02-14 PROCEDURE — 25010000002 ENOXAPARIN PER 10 MG: Performed by: INTERNAL MEDICINE

## 2022-02-14 PROCEDURE — 80048 BASIC METABOLIC PNL TOTAL CA: CPT | Performed by: INTERNAL MEDICINE

## 2022-02-14 PROCEDURE — 94640 AIRWAY INHALATION TREATMENT: CPT

## 2022-02-14 PROCEDURE — 85025 COMPLETE CBC W/AUTO DIFF WBC: CPT | Performed by: INTERNAL MEDICINE

## 2022-02-14 PROCEDURE — 85379 FIBRIN DEGRADATION QUANT: CPT | Performed by: INTERNAL MEDICINE

## 2022-02-14 PROCEDURE — 85007 BL SMEAR W/DIFF WBC COUNT: CPT | Performed by: INTERNAL MEDICINE

## 2022-02-14 PROCEDURE — 94761 N-INVAS EAR/PLS OXIMETRY MLT: CPT

## 2022-02-14 PROCEDURE — 25010000002 REMDESIVIR 100 MG/20ML SOLUTION 1 EACH VIAL: Performed by: INTERNAL MEDICINE

## 2022-02-14 RX ORDER — HYDRALAZINE HYDROCHLORIDE 25 MG/1
25 TABLET, FILM COATED ORAL EVERY 12 HOURS SCHEDULED
Status: DISCONTINUED | OUTPATIENT
Start: 2022-02-14 | End: 2022-02-16 | Stop reason: HOSPADM

## 2022-02-14 RX ORDER — TAMSULOSIN HYDROCHLORIDE 0.4 MG/1
0.4 CAPSULE ORAL NIGHTLY
Status: DISCONTINUED | OUTPATIENT
Start: 2022-02-14 | End: 2022-02-15

## 2022-02-14 RX ORDER — FINASTERIDE 5 MG/1
5 TABLET, FILM COATED ORAL NIGHTLY
Status: DISCONTINUED | OUTPATIENT
Start: 2022-02-14 | End: 2022-02-16 | Stop reason: HOSPADM

## 2022-02-14 RX ADMIN — BUDESONIDE AND FORMOTEROL FUMARATE DIHYDRATE 2 PUFF: 160; 4.5 AEROSOL RESPIRATORY (INHALATION) at 19:35

## 2022-02-14 RX ADMIN — ENOXAPARIN SODIUM 40 MG: 40 INJECTION SUBCUTANEOUS at 17:01

## 2022-02-14 RX ADMIN — GUAIFENESIN 600 MG: 600 TABLET, EXTENDED RELEASE ORAL at 08:54

## 2022-02-14 RX ADMIN — FINASTERIDE 5 MG: 5 TABLET, FILM COATED ORAL at 20:26

## 2022-02-14 RX ADMIN — FAMOTIDINE 40 MG: 20 TABLET ORAL at 08:54

## 2022-02-14 RX ADMIN — BUDESONIDE AND FORMOTEROL FUMARATE DIHYDRATE 2 PUFF: 160; 4.5 AEROSOL RESPIRATORY (INHALATION) at 07:11

## 2022-02-14 RX ADMIN — DEXAMETHASONE 6 MG: 6 TABLET ORAL at 08:54

## 2022-02-14 RX ADMIN — ALBUTEROL SULFATE 2 PUFF: 108 INHALANT RESPIRATORY (INHALATION) at 19:35

## 2022-02-14 RX ADMIN — Medication 3 ML: at 20:26

## 2022-02-14 RX ADMIN — HYDRALAZINE HYDROCHLORIDE 25 MG: 25 TABLET, FILM COATED ORAL at 20:26

## 2022-02-14 RX ADMIN — ALBUTEROL SULFATE 2 PUFF: 108 INHALANT RESPIRATORY (INHALATION) at 07:11

## 2022-02-14 RX ADMIN — HYDRALAZINE HYDROCHLORIDE 25 MG: 25 TABLET, FILM COATED ORAL at 08:54

## 2022-02-14 RX ADMIN — REMDESIVIR 100 MG: 100 INJECTION, POWDER, LYOPHILIZED, FOR SOLUTION INTRAVENOUS at 18:28

## 2022-02-14 RX ADMIN — GUAIFENESIN 600 MG: 600 TABLET, EXTENDED RELEASE ORAL at 20:26

## 2022-02-14 RX ADMIN — ALBUTEROL SULFATE 2 PUFF: 108 INHALANT RESPIRATORY (INHALATION) at 11:10

## 2022-02-14 RX ADMIN — Medication 3 ML: at 08:55

## 2022-02-14 RX ADMIN — ALBUTEROL SULFATE 2 PUFF: 108 INHALANT RESPIRATORY (INHALATION) at 15:00

## 2022-02-14 NOTE — CASE MANAGEMENT/SOCIAL WORK
Discharge Planning Assessment   Luis Daniel     Patient Name: Iván Fowler  MRN: [id number]  Today's Date: 2/14/2022    Admit Date: 2/13/2022     Discharge Needs Assessment     Row Name 02/14/22 1402       Living Environment    Lives With spouse    Name(s) of Who Lives With Patient Spouse - Barb    Current Living Arrangements home/apartment/condo    Primary Care Provided by self    Provides Primary Care For no one, unable/limited ability to care for self    Family Caregiver if Needed spouse    Quality of Family Relationships supportive; involved; helpful    Able to Return to Prior Arrangements yes       Resource/Environmental Concerns    Resource/Environmental Concerns none    Transportation Concerns car, none       Transition Planning    Patient/Family Anticipates Transition to home with family    Patient/Family Anticipated Services at Transition none    Transportation Anticipated family or friend will provide       Discharge Needs Assessment    Readmission Within the Last 30 Days no previous admission in last 30 days    Equipment Currently Used at Home none    Concerns to be Addressed discharge planning    Anticipated Changes Related to Illness none    Equipment Needed After Discharge none    Provided Post Acute Provider List? N/A    Provided Post Acute Provider Quality & Resource List? N/A               Discharge Plan     Row Name 02/14/22 0374       Plan    Plan DC Plan: Anticipate routine to home with spouse.    Patient/Family in Agreement with Plan yes    Plan Comments Attempted to contact patient via room phone with no answer. Called patient's emergency contact/Spouse, Barb ([phone]) with no answer. VM left with name CM name and call back number.  Later received callback from patient's spouse Barb. PCP and pharmacy verified. Spouse reports that the patients is independent at home. Spouse reports patient has no current DME/ Home services and anticipates that none will be needed at discharge.                Demographic Summary     Row Name 02/14/22 1402       General Information    Admission Type inpatient    Arrived From emergency department    Referral Source admission list    Reason for Consult discharge planning    Preferred Language English     Used During This Interaction no       Contact Information    Permission Granted to Share Info With                Functional Status     Row Name 02/14/22 1403       Functional Status    Usual Activity Tolerance excellent    Current Activity Tolerance good       Functional Status, IADL    Medications independent    Meal Preparation independent    Housekeeping independent    Laundry independent    Shopping independent       Employment/    Employment Status employed full-time    Employment/ Comments [employer]            Phone communication or documentation only- no physical contact with patient or family.    Marlen Anderson RN     Office Phone: [phone]  Office Cell: [phone]

## 2022-02-14 NOTE — PAYOR COMM NOTE
"AUTHORIZATION PENDING:   PLEASE CALL OR FAX DETERMINATION TO CONTACT BELOW. THANK YOU.        Melanie Olivia RN MSN  /UR  [facility]  [phone] office  [phone] fax  [email or web address]    [facility]  NPI: [id number]  Tax: [phone]            Iván Fowler (71 y.o. Male)             Date of Birth Social Security Number Address Home Phone MRN    [id number]  [address] [place] [address] [phone] [id number]    Jainism Marital Status             Unknown        Admission Date Admission Type Admitting Provider Attending Provider Department, Room/Bed    2/13/22 Emergency Maryam Valdovinos MD Lowney, Kay, MD [facility] 2A PEDIATRICS, 202/1    Discharge Date Discharge Disposition Discharge Destination                         Attending Provider: Maryam Valdovinos MD    Allergies: No Known Allergies    Isolation: Enh Drop/Con   Infection: None   Code Status: CPR   Advance Care Planning Activity    Ht: 182.9 cm (72\")   Wt: 87.1 kg (192 lb 0.3 oz)    Admission Cmt: None   Principal Problem: None                Active Insurance as of 2/13/2022     Primary Coverage     Payor Plan Insurance Group Employer/Plan Group    [facility] [employer] [facility] [employer] [facility] PPO [id number]     Payor Plan Address Payor Plan Phone Number Payor Plan Fax Number Effective Dates    [address] [phone]  1/1/2019 - None Entered    [place] [address]       Subscriber Name Subscriber Birth Date Member ID       IVÁN FOWLER 1950 [id number]                 Emergency Contacts      (Rel.) Home Phone Work Phone Mobile Phone    VANESSA FOWLER (Spouse) -- -- [phone]        02/13/22 1713  Inpatient Admission  Once     Completed     Level of Care: Med/Surg    Diagnosis: Acute respiratory distress [037496]    Admitting Physician: MARYAM VALDOVINOS [[id number]]    Attending Physician: MARYAM VALDOVINOS [[id number]]    Certification: I certify that " inpatient hospital services are medically necessary for greater than 2 midnights.                History & Physical    No notes of this type exist for this encounter.            Emergency Department Notes      Rolando Yang MD at 02/13/22 1630          Subjective   Chief complaint Short of breath Covid positive    History of present is a 71-year-old male with history of non-Hodgkin's lymphoma who states that he tested positive for Covid this past Wednesday and he has been having some cough congestion fever chills and increasing shortness of breath since that time.  He denies any chest pain he is got body aches.  No vomiting or diarrhea.  Patient is vaccinated including a booster.  Symptoms are severe worse with exertion better with rest ongoing continues since Wednesday.  No other complaints at this time           Review of Systems   Constitutional: Positive for chills and fever.   HENT: Positive for congestion. Negative for sinus pressure.    Eyes: Negative for photophobia and visual disturbance.   Respiratory: Positive for cough and shortness of breath. Negative for chest tightness.    Cardiovascular: Negative for chest pain and palpitations.   Gastrointestinal: Negative for abdominal pain and vomiting.   Endocrine: Negative for cold intolerance and heat intolerance.   Genitourinary: Negative for difficulty urinating and dysuria.   Musculoskeletal: Positive for myalgias. Negative for arthralgias.   Skin: Negative for color change and rash.   Neurological: Positive for weakness. Negative for dizziness.   Psychiatric/Behavioral: Negative for agitation and behavioral problems.       Past Medical History:   Diagnosis Date   • Hyperlipidemia    • Hypertension      Non-Hodgkin's lymphoma  No Known Allergies    Past Surgical History:   Procedure Laterality Date   • APPENDECTOMY     • US GUIDED LYMPH NODE BIOPSY  9/25/2020   • VASECTOMY Bilateral        No family history on file.    Social History     Socioeconomic  History   • Marital status:    Tobacco Use   • Smoking status: Never Smoker   Substance and Sexual Activity   • Alcohol use: Yes   • Drug use: Never   • Sexual activity: Defer     Prior to Admission medications    Medication Sig Start Date End Date Taking? Authorizing Provider   fenofibrate (TRICOR) 145 MG tablet Take 1 tablet by mouth Daily. 9/10/20   Rocael Robbins MD   lisinopril (PRINIVIL,ZESTRIL) 10 MG tablet Take 10 mg by mouth Daily. 9/4/20   Rocael Robbins MD           Objective   Physical Exam  71-year-old male awake alert moderate distress but nontoxic-appearing room air saturations anywhere between 88% and 90% on room air.  Patient placed on 2 L nasal cannula improved sats up to 98%.  HEENT extraocular muscles intact pupils equal round reactive sclera clear mouth clear neck supple no adenopathy no JVD no meningeal signs lungs diffuse rhonchi throughout no retraction no use of accessory heart regular slight tachycardic without murmur abdomen was soft without tenderness no other masses good bowel sounds extremities pulses equal throughout upper and lower extremities no edema cords or Homans' sign no evidence of DVT skin warm dry without rashes or cellulitic changes neurologic he is awake alert orientated x3 is no facial symmetry speech is normal he said occasionally conversation is no focal deficits.  Procedures          ED Course      Results for orders placed or performed during the hospital encounter of 02/13/22   Comprehensive Metabolic Panel    Specimen: Blood   Result Value Ref Range    Glucose 108 (H) 65 - 99 mg/dL    BUN 17 8 - 23 mg/dL    Creatinine 1.18 0.76 - 1.27 mg/dL    Sodium 139 136 - 145 mmol/L    Potassium 3.3 (L) 3.5 - 5.2 mmol/L    Chloride 104 98 - 107 mmol/L    CO2 23.0 22.0 - 29.0 mmol/L    Calcium 9.0 8.6 - 10.5 mg/dL    Total Protein 6.0 6.0 - 8.5 g/dL    Albumin 3.20 (L) 3.50 - 5.20 g/dL    ALT (SGPT) 28 1 - 41 U/L    AST (SGOT) 41 (H) 1 - 40 U/L    Alkaline  Phosphatase 38 (L) 39 - 117 U/L    Total Bilirubin 0.3 0.0 - 1.2 mg/dL    eGFR Non African Amer 61 >60 mL/min/1.73    Globulin 2.8 gm/dL    A/G Ratio 1.1 g/dL    BUN/Creatinine Ratio 14.4 7.0 - 25.0    Anion Gap 12.0 5.0 - 15.0 mmol/L   Urinalysis With Microscopic If Indicated (No Culture) - Urine, Clean Catch    Specimen: Urine, Clean Catch   Result Value Ref Range    Color, UA Yellow Yellow, Straw    Appearance, UA Clear Clear    pH, UA 6.5 5.0 - 8.0    Specific Gravity, UA 1.016 1.005 - 1.030    Glucose, UA Negative Negative    Ketones, UA Negative Negative    Bilirubin, UA Negative Negative    Blood, UA Small (1+) (A) Negative    Protein, UA 30 mg/dL (1+) (A) Negative    Leuk Esterase, UA Negative Negative    Nitrite, UA Negative Negative    Urobilinogen, UA 1.0 E.U./dL 0.2 - 1.0 E.U./dL   Troponin    Specimen: Blood   Result Value Ref Range    Troponin T <0.010 0.000 - 0.030 ng/mL   CBC Auto Differential    Specimen: Blood   Result Value Ref Range    WBC 2.80 (L) 3.40 - 10.80 10*3/mm3    RBC 3.40 (L) 4.14 - 5.80 10*6/mm3    Hemoglobin 9.7 (L) 13.0 - 17.7 g/dL    Hematocrit 28.6 (L) 37.5 - 51.0 %    MCV 84.1 79.0 - 97.0 fL    MCH 28.7 26.6 - 33.0 pg    MCHC 34.1 31.5 - 35.7 g/dL    RDW 13.1 12.3 - 15.4 %    RDW-SD 38.9 37.0 - 54.0 fl    MPV 7.3 6.0 - 12.0 fL    Platelets 267 140 - 450 10*3/mm3    Neutrophil % 84.3 (H) 42.7 - 76.0 %    Lymphocyte % 4.3 (L) 19.6 - 45.3 %    Monocyte % 10.9 5.0 - 12.0 %    Eosinophil % 0.4 0.3 - 6.2 %    Basophil % 0.1 0.0 - 1.5 %    Neutrophils, Absolute 2.30 1.70 - 7.00 10*3/mm3    Lymphocytes, Absolute 0.10 (L) 0.70 - 3.10 10*3/mm3    Monocytes, Absolute 0.30 0.10 - 0.90 10*3/mm3    Eosinophils, Absolute 0.00 0.00 - 0.40 10*3/mm3    Basophils, Absolute 0.00 0.00 - 0.20 10*3/mm3    nRBC 0.0 0.0 - 0.2 /100 WBC   Urinalysis, Microscopic Only - Urine, Clean Catch    Specimen: Urine, Clean Catch   Result Value Ref Range    RBC, UA 13-20 (A) None Seen /HPF    WBC, UA 0-2 (A) None Seen  /HPF    Bacteria, UA None Seen None Seen /HPF    Squamous Epithelial Cells, UA 0-2 None Seen, 0-2 /HPF    Hyaline Casts, UA None Seen None Seen /LPF    Methodology Automated Microscopy    Hepatic Function Panel    Specimen: Blood   Result Value Ref Range    Total Protein 5.8 (L) 6.0 - 8.5 g/dL    Albumin 3.20 (L) 3.50 - 5.20 g/dL    ALT (SGPT) 24 1 - 41 U/L    AST (SGOT) 38 1 - 40 U/L    Alkaline Phosphatase 35 (L) 39 - 117 U/L    Total Bilirubin 0.3 0.0 - 1.2 mg/dL    Bilirubin, Direct 0.2 0.0 - 0.3 mg/dL    Bilirubin, Indirect 0.1 mg/dL   Creatinine, Serum    Specimen: Blood   Result Value Ref Range    Creatinine 1.03 0.76 - 1.27 mg/dL    eGFR Non African Amer 72 >60 mL/min/1.73   POC Lactate    Specimen: Blood   Result Value Ref Range    Lactate 0.9 0.5 - 2.0 mmol/L   ECG 12 Lead   Result Value Ref Range    QT Interval 323 ms     XR Chest 1 View    Result Date: 2/13/2022  Patchy multifocal bilateral airspace disease most concerning for COVID-19 pneumonia.  Electronically Signed By-Orlando Mckee MD On:2/13/2022 3:54 PM This report was finalized on [id number] by  Orlando Mckee MD.    Medications   sodium chloride 0.9 % flush 10 mL (has no administration in time range)   Pharmacy Consult - Remdesivir (has no administration in time range)   remdesivir 200 mg in 290 mL NS (0 mg Intravenous Stopped 2/13/22 1914)     Followed by   remdesivir 100 mg in sodium chloride 0.9 % 250 mL IVPB (powder vial) (has no administration in time range)   sodium chloride 0.9 % flush 3 mL (has no administration in time range)   sodium chloride 0.9 % flush 3-10 mL (has no administration in time range)   enoxaparin (LOVENOX) syringe 40 mg (40 mg Subcutaneous Given 2/13/22 1757)   nitroglycerin (NITROSTAT) SL tablet 0.4 mg (has no administration in time range)   acetaminophen (TYLENOL) tablet 650 mg (has no administration in time range)   ondansetron (ZOFRAN) injection 4 mg (has no administration in time range)   famotidine (PEPCID) tablet  40 mg (40 mg Oral Given 2/13/22 1758)   dexamethasone (DECADRON) tablet 6 mg (has no administration in time range)   budesonide-formoterol (SYMBICORT) 160-4.5 MCG/ACT inhaler 2 puff (has no administration in time range)   albuterol sulfate HFA (PROVENTIL HFA;VENTOLIN HFA;PROAIR HFA) inhaler 2 puff (has no administration in time range)   guaiFENesin (MUCINEX) 12 hr tablet 600 mg (has no administration in time range)   zinc sulfate (ZINCATE) capsule 220 mg (has no administration in time range)   potassium chloride (K-DUR,KLOR-CON) CR tablet 40 mEq (40 mEq Oral Given 2/13/22 1757)   potassium chloride (KLOR-CON) packet 40 mEq (has no administration in time range)   sodium chloride 0.9 % bolus 1,000 mL (0 mL Intravenous Stopped 2/13/22 1758)   acetaminophen (TYLENOL) tablet 1,000 mg (1,000 mg Oral Given 2/13/22 1548)   dexamethasone (DECADRON) injection 6 mg (6 mg Intravenous Given 2/13/22 1632)     EKG my interpretation normal sinus rhythm rate of 100 normal axis no hypertrophy QTC of 426 normal EKG                                             MDM  Number of Diagnoses or Management Options  Acute respiratory distress: new and requires workup  Hypoxia: new and requires workup  Pneumonia due to COVID-19 virus: new and requires workup  Diagnosis management comments: Medical decision making.  Patient IV established placed on a cardiac monitor given liter of saline a gram of Tylenol p.o.  The patient had a EKG which showed a sinus tachycardia about rate of 104 on my evaluation without any acute ST changes or other findings labs reviewed by me glucose was 108 otherwise chemistries unremarkable AST of 41 white count was 3.8 hemoglobin is 9.7.  The patient's cultures are pending troponin negative lactate was normal urine was negative.  Chest x-ray diffuse patchy bilateral pneumonia consistent with COVID-19.  Patient started on Decadron and remdesivir since he is hypoxic he has a history of non-Hodgkin's lymphoma he is 71 and a  history of hypertension.  Patient repeat exam was made aware of the findings he is nontoxic-appearing his sats are 99% on 2 L of oxygen.  Respiratory about 20.  No evidence of sepsis on clinical exam he is nontoxic-appearing he is awake alert carries conversation good skin turgor his abdomen soft without tenderness heart regular with a rate in the 90s.  See no evidence suggest DVT or pulmonary embolism or acute myocardial infarction or ischemia.  Dr. Valdovinos notified and the patient will be placed in the hospital for further care stable unremarkable ER course       Amount and/or Complexity of Data Reviewed  Clinical lab tests: reviewed  Tests in the radiology section of CPT®: reviewed  Discuss the patient with other providers: yes    Risk of Complications, Morbidity, and/or Mortality  Presenting problems: high  Diagnostic procedures: high  Management options: high    Patient Progress  Patient progress: stable      Final diagnoses:   Acute respiratory distress   Pneumonia due to COVID-19 virus   Hypoxia       ED Disposition  ED Disposition     ED Disposition Condition Comment    Decision to Admit  Level of Care: Med/Surg [1]   Diagnosis: Acute respiratory distress [664580]   Admitting Physician: MARYAM VALDOVINOS [[id number]]   Attending Physician: MARYAM VALDOVINOS [[id number]]   Certification: I certify that inpatient hospital services are medically necessary for greater than 2 midnights.            No follow-up provider specified.       Medication List      No changes were made to your prescriptions during this visit.          Rolando Yang MD  02/13/22 1649       Rolando Yang MD  02/13/22 2014      Electronically signed by Rolando Yang MD at 02/13/22 2014       Physician Progress Notes (all)    No notes of this type exist for this encounter.         Consult Notes (all)    No notes of this type exist for this encounter.

## 2022-02-14 NOTE — H&P
Patient Care Team:  Mat Aiken MD as PCP - General (Family Medicine)    Chief complaint shortness of breath    Subjective     Patient is a 71 y.o. male with h/o follicular lymphoma on maintenance chemotherapy who presents with progessive SOA x 4-5 days.  He tested positive for COVID 2/9 at urgent care and was being treated with steroids and inhaler.  He is fully vaccinated.  He reports cough, chest congestion, body aches.  He feels better with treatment he received in ER.      Review of Systems   Constitutional: Positive for activity change, appetite change, fatigue and fever. Negative for diaphoresis.   HENT: Negative for facial swelling.    Eyes: Negative for visual disturbance.   Respiratory: Positive for cough and shortness of breath. Negative for wheezing and stridor.    Cardiovascular: Negative for chest pain, palpitations and leg swelling.   Gastrointestinal: Negative for abdominal pain, constipation, diarrhea, nausea and vomiting.   Endocrine: Negative for heat intolerance.   Genitourinary: Negative for dysuria.   Musculoskeletal: Negative for arthralgias, back pain, gait problem and joint swelling.   Skin: Negative for rash and wound.   Neurological: Negative for dizziness, tremors, seizures, weakness and headaches.   Psychiatric/Behavioral: Negative for confusion.          History  Past Medical History:   Diagnosis Date   • Hyperlipidemia    • Hypertension      Past Surgical History:   Procedure Laterality Date   • APPENDECTOMY     • US GUIDED LYMPH NODE BIOPSY  9/25/2020   • VASECTOMY Bilateral      No family history on file.  Social History     Tobacco Use   • Smoking status: Never Smoker   • Smokeless tobacco: Not on file   Substance Use Topics   • Alcohol use: Yes   • Drug use: Never     Medications Prior to Admission   Medication Sig Dispense Refill Last Dose   • fenofibrate (TRICOR) 145 MG tablet Take 145 mg by mouth Daily.   2/12/2022 at Unknown time   • finasteride (PROSCAR) 5 MG tablet  Take 5 mg by mouth Every Night.      • hydrALAZINE (APRESOLINE) 25 MG tablet Take 25 mg by mouth 2 (Two) Times a Day.        Allergies:  Patient has no known allergies.    Objective     Vital Signs  Temp:  [97.5 °F (36.4 °C)-98.2 °F (36.8 °C)] 97.5 °F (36.4 °C)  Heart Rate:  [62-99] 94  Resp:  [12-28] 20  BP: (118-148)/(62-95) 145/73     Physical Exam:      General Appearance:    Alert, cooperative, in no acute distress   Head:    Normocephalic, without obvious abnormality, atraumatic   Eyes:            Lids and lashes normal, conjunctivae and sclerae normal, no   icterus, no pallor, corneas clear, PERRLA   Ears:    Ears appear intact with no abnormalities noted   Throat:   No oral lesions, no thrush, oral mucosa moist   Neck:   No adenopathy, supple, trachea midline, no thyromegaly, no   carotid bruit, no JVD   Lungs:     Bibasilar rhonchi    Heart:    Regular rhythm and normal rate, normal S1 and S2, no            murmur, no gallop, no rub, no click   Chest Wall:    Port in right chest wall   Abdomen:     Normal bowel sounds, no masses, no organomegaly, soft        non-tender, non-distended, no guarding, no rebound                tenderness   Extremities:   Moves all extremities well, no edema, no cyanosis, no             redness   Pulses:   Pulses palpable and equal bilaterally   Skin:   No bleeding, bruising or rash   Lymph nodes:   No palpable adenopathy   Neurologic:   Cranial nerves 2 - 12 grossly intact, sensation intact, DTR       present and equal bilaterally       Results Review:     Imaging Results (Last 24 Hours)     ** No results found for the last 24 hours. **           Lab Results (last 24 hours)     Procedure Component Value Units Date/Time    Blood Culture - Blood, Hand, Right [[id number]]  (Normal) Collected: 02/13/22 1535    Specimen: Blood from Hand, Right Updated: 02/14/22 1545     Blood Culture No growth at 24 hours    Blood Culture - Blood, Arm, Left [[id number]]  (Normal) Collected: 02/13/22  1535    Specimen: Blood from Arm, Left Updated: 02/14/22 1545     Blood Culture No growth at 24 hours    D-dimer, Quantitative [[id number]]  (Abnormal) Collected: 02/14/22 0903    Specimen: Blood Updated: 02/14/22 0932     D-Dimer, Quantitative 0.76 mg/L (FEU)     Narrative:      Reference Range  --------------------------------------------------------------------     < 0.50   Negative Predictive Value  0.50-0.59   Indeterminate    >= 0.60   Probable VTE             A very low percentage of patients with DVT may yield D-Dimer results   below the cut-off of 0.50 mg/L FEU.  This is known to be more   prevalent in patients with distal DVT.             Results of this test should always be interpreted in conjunction with   the patient's medical history, clinical presentation and other   findings.  Clinical diagnosis should not be based on the result of   INNOVANCE D-Dimer alone.    Manual Differential [[id number]]  (Abnormal) Collected: 02/14/22 0543    Specimen: Blood Updated: 02/14/22 0704     Neutrophil % 84.0 %      Lymphocyte % 7.0 %      Monocyte % 7.0 %      Bands %  1.0 %      Metamyelocyte % 1.0 %      Neutrophils Absolute 1.79 10*3/mm3      Lymphocytes Absolute 0.15 10*3/mm3      Monocytes Absolute 0.15 10*3/mm3      Elliptocytes Slight/1+     WBC Morphology Normal     Platelet Morphology Normal    CBC & Differential [[id number]]  (Abnormal) Collected: 02/14/22 0543    Specimen: Blood Updated: 02/14/22 0704    Narrative:      The following orders were created for panel order CBC & Differential.  Procedure                               Abnormality         Status                     ---------                               -----------         ------                     CBC Auto Differential[[id number]]        Abnormal            Final result               Scan Slide[[id number]]                                       Final result                 Please view results for these tests on the individual orders.    Scan Slide  [[id number]] Collected: 02/14/22 0543    Specimen: Blood Updated: 02/14/22 0704     Scan Slide --     Comment: See Manual Differential Results       CBC Auto Differential [[id number]]  (Abnormal) Collected: 02/14/22 0543    Specimen: Blood Updated: 02/14/22 0704     WBC 2.10 10*3/mm3      RBC 3.06 10*6/mm3      Hemoglobin 9.2 g/dL      Hematocrit 25.7 %      MCV 84.2 fL      MCH 30.1 pg      MCHC 35.8 g/dL      RDW 13.3 %      RDW-SD 39.8 fl      MPV 7.5 fL      Platelets 240 10*3/mm3     Narrative:      The previously reported component NRBC is no longer being reported. Previous result was 0.0 /100 WBC (Reference Range: 0.0-0.2 /100 WBC) on 2/14/2022 at 0552 EST.    Basic Metabolic Panel [[id number]]  (Abnormal) Collected: 02/14/22 0459    Specimen: Blood Updated: 02/14/22 0620     Glucose 118 mg/dL      BUN 21 mg/dL      Creatinine 1.04 mg/dL      Sodium 141 mmol/L      Potassium 4.0 mmol/L      Chloride 108 mmol/L      CO2 23.0 mmol/L      Calcium 8.6 mg/dL      eGFR Non African Amer 71 mL/min/1.73      BUN/Creatinine Ratio 20.2     Anion Gap 10.0 mmol/L     Narrative:      GFR Normal >60  Chronic Kidney Disease <60  Kidney Failure <15      Hepatic Function Panel [[id number]]  (Abnormal) Collected: 02/14/22 0459    Specimen: Blood Updated: 02/14/22 0620     Total Protein 5.7 g/dL      Albumin 3.20 g/dL      ALT (SGPT) 23 U/L      AST (SGOT) 32 U/L      Alkaline Phosphatase 34 U/L      Total Bilirubin 0.3 mg/dL      Bilirubin, Direct <0.2 mg/dL      Bilirubin, Indirect --     Comment: Unable to calculate       Creatinine, Serum [[id number]]  (Normal) Collected: 02/13/22 1724    Specimen: Blood Updated: 02/13/22 1754     Creatinine 1.03 mg/dL      eGFR Non African Amer 72 mL/min/1.73     Narrative:      GFR Normal >60  Chronic Kidney Disease <60  Kidney Failure <15      Hepatic Function Panel [[id number]]  (Abnormal) Collected: 02/13/22 1724    Specimen: Blood Updated: 02/13/22 1754     Total Protein 5.8 g/dL       Albumin 3.20 g/dL      ALT (SGPT) 24 U/L      AST (SGOT) 38 U/L      Alkaline Phosphatase 35 U/L      Total Bilirubin 0.3 mg/dL      Bilirubin, Direct 0.2 mg/dL      Bilirubin, Indirect 0.1 mg/dL            I reviewed the patient's new clinical results.    Assessment/Plan       Acute respiratory distress  COVID pneumonia - starting remdesivir, steroids, bronchodilators, mucinex, dvt prophylaxis;  Acute hypoxemia - supplemental oxygen  Follicular lymphoma - counts are stable; will notify Dr. Pringle of admission          I discussed the patient's findings and my recommendations with patient.     Rebeka Morales MD  02/14/22  16:17 EST

## 2022-02-14 NOTE — PLAN OF CARE
Pt on room air and resting comfortably  Problem: Adult Inpatient Plan of Care  Goal: Absence of Hospital-Acquired Illness or Injury  Intervention: Identify and Manage Fall Risk  Recent Flowsheet Documentation  Taken 2/14/2022 1600 by Marcelino Lehman, RN  Safety Promotion/Fall Prevention:   safety round/check completed   nonskid shoes/slippers when out of bed   fall prevention program maintained   clutter free environment maintained  Taken 2/14/2022 1400 by Marcelino Lehman, RN  Safety Promotion/Fall Prevention:   safety round/check completed   nonskid shoes/slippers when out of bed   fall prevention program maintained   clutter free environment maintained  Taken 2/14/2022 1200 by Marcelino Lehman, RN  Safety Promotion/Fall Prevention:   safety round/check completed   nonskid shoes/slippers when out of bed   fall prevention program maintained   clutter free environment maintained  Taken 2/14/2022 1000 by Marcelino Lehman, RN  Safety Promotion/Fall Prevention:   safety round/check completed   nonskid shoes/slippers when out of bed   fall prevention program maintained   clutter free environment maintained  Taken 2/14/2022 0800 by Marcelino Lehman, RN  Safety Promotion/Fall Prevention:   safety round/check completed   nonskid shoes/slippers when out of bed   fall prevention program maintained   clutter free environment maintained  Goal: Readiness for Transition of Care  Intervention: Mutually Develop Transition Plan  Recent Flowsheet Documentation  Taken 2/14/2022 0937 by Marcelino Lehman, RN  Equipment Currently Used at Home: none   Goal Outcome Evaluation:

## 2022-02-14 NOTE — PLAN OF CARE
Pt resting will continue to monitor  Problem: Adult Inpatient Plan of Care  Goal: Readiness for Transition of Care  Intervention: Mutually Develop Transition Plan  Recent Flowsheet Documentation  Taken 2/14/2022 0900 by Marcelino Lehman, RN  Equipment Currently Used at Home: none   Goal Outcome Evaluation:

## 2022-02-14 NOTE — PLAN OF CARE
Goal Outcome Evaluation:      Received patient from ED. Patient was Aox4 and can run tachy at times. No complaints during shift with wife at bedside. Will continue to monitor.

## 2022-02-15 LAB
ALBUMIN SERPL-MCNC: 3 G/DL (ref 3.5–5.2)
ALP SERPL-CCNC: 35 U/L (ref 39–117)
ALT SERPL W P-5'-P-CCNC: 23 U/L (ref 1–41)
ANION GAP SERPL CALCULATED.3IONS-SCNC: 11 MMOL/L (ref 5–15)
AST SERPL-CCNC: 33 U/L (ref 1–40)
BASOPHILS # BLD AUTO: 0 10*3/MM3 (ref 0–0.2)
BASOPHILS NFR BLD AUTO: 0.1 % (ref 0–1.5)
BILIRUB CONJ SERPL-MCNC: <0.2 MG/DL (ref 0–0.3)
BILIRUB INDIRECT SERPL-MCNC: ABNORMAL MG/DL
BILIRUB SERPL-MCNC: 0.2 MG/DL (ref 0–1.2)
BUN SERPL-MCNC: 26 MG/DL (ref 8–23)
BUN/CREAT SERPL: 25.5 (ref 7–25)
CALCIUM SPEC-SCNC: 8.9 MG/DL (ref 8.6–10.5)
CHLORIDE SERPL-SCNC: 110 MMOL/L (ref 98–107)
CO2 SERPL-SCNC: 24 MMOL/L (ref 22–29)
CREAT SERPL-MCNC: 1.02 MG/DL (ref 0.76–1.27)
D DIMER PPP FEU-MCNC: 0.46 MG/L (FEU) (ref 0–0.59)
DEPRECATED RDW RBC AUTO: 39.8 FL (ref 37–54)
EOSINOPHIL # BLD AUTO: 0 10*3/MM3 (ref 0–0.4)
EOSINOPHIL NFR BLD AUTO: 0 % (ref 0.3–6.2)
ERYTHROCYTE [DISTWIDTH] IN BLOOD BY AUTOMATED COUNT: 13.5 % (ref 12.3–15.4)
GFR SERPL CREATININE-BSD FRML MDRD: 72 ML/MIN/1.73
GLUCOSE SERPL-MCNC: 103 MG/DL (ref 65–99)
HCT VFR BLD AUTO: 25.4 % (ref 37.5–51)
HGB BLD-MCNC: 9 G/DL (ref 13–17.7)
LYMPHOCYTES # BLD AUTO: 0.1 10*3/MM3 (ref 0.7–3.1)
LYMPHOCYTES NFR BLD AUTO: 2.3 % (ref 19.6–45.3)
MCH RBC QN AUTO: 29.9 PG (ref 26.6–33)
MCHC RBC AUTO-ENTMCNC: 35.6 G/DL (ref 31.5–35.7)
MCV RBC AUTO: 84 FL (ref 79–97)
MONOCYTES # BLD AUTO: 0.4 10*3/MM3 (ref 0.1–0.9)
MONOCYTES NFR BLD AUTO: 8.9 % (ref 5–12)
NEUTROPHILS NFR BLD AUTO: 4.3 10*3/MM3 (ref 1.7–7)
NEUTROPHILS NFR BLD AUTO: 88.7 % (ref 42.7–76)
NRBC BLD AUTO-RTO: 0.2 /100 WBC (ref 0–0.2)
PLATELET # BLD AUTO: 270 10*3/MM3 (ref 140–450)
PMV BLD AUTO: 7.6 FL (ref 6–12)
POTASSIUM SERPL-SCNC: 4.1 MMOL/L (ref 3.5–5.2)
PROT SERPL-MCNC: 5.6 G/DL (ref 6–8.5)
RBC # BLD AUTO: 3.02 10*6/MM3 (ref 4.14–5.8)
SODIUM SERPL-SCNC: 145 MMOL/L (ref 136–145)
WBC NRBC COR # BLD: 4.9 10*3/MM3 (ref 3.4–10.8)

## 2022-02-15 PROCEDURE — 85379 FIBRIN DEGRADATION QUANT: CPT | Performed by: INTERNAL MEDICINE

## 2022-02-15 PROCEDURE — 94799 UNLISTED PULMONARY SVC/PX: CPT

## 2022-02-15 PROCEDURE — 80048 BASIC METABOLIC PNL TOTAL CA: CPT | Performed by: INTERNAL MEDICINE

## 2022-02-15 PROCEDURE — 25010000002 ENOXAPARIN PER 10 MG: Performed by: INTERNAL MEDICINE

## 2022-02-15 PROCEDURE — 85025 COMPLETE CBC W/AUTO DIFF WBC: CPT | Performed by: INTERNAL MEDICINE

## 2022-02-15 PROCEDURE — 80076 HEPATIC FUNCTION PANEL: CPT | Performed by: INTERNAL MEDICINE

## 2022-02-15 PROCEDURE — 25010000002 REMDESIVIR 100 MG/20ML SOLUTION 1 EACH VIAL: Performed by: INTERNAL MEDICINE

## 2022-02-15 RX ORDER — DEXAMETHASONE 4 MG/1
4 TABLET ORAL
Status: DISCONTINUED | OUTPATIENT
Start: 2022-02-16 | End: 2022-02-16 | Stop reason: HOSPADM

## 2022-02-15 RX ADMIN — ALBUTEROL SULFATE 2 PUFF: 108 INHALANT RESPIRATORY (INHALATION) at 07:40

## 2022-02-15 RX ADMIN — ALBUTEROL SULFATE 2 PUFF: 108 INHALANT RESPIRATORY (INHALATION) at 19:05

## 2022-02-15 RX ADMIN — REMDESIVIR 100 MG: 100 INJECTION, POWDER, LYOPHILIZED, FOR SOLUTION INTRAVENOUS at 17:30

## 2022-02-15 RX ADMIN — BUDESONIDE AND FORMOTEROL FUMARATE DIHYDRATE 2 PUFF: 160; 4.5 AEROSOL RESPIRATORY (INHALATION) at 07:43

## 2022-02-15 RX ADMIN — HYDRALAZINE HYDROCHLORIDE 25 MG: 25 TABLET, FILM COATED ORAL at 08:23

## 2022-02-15 RX ADMIN — DEXAMETHASONE 6 MG: 6 TABLET ORAL at 08:23

## 2022-02-15 RX ADMIN — FINASTERIDE 5 MG: 5 TABLET, FILM COATED ORAL at 20:23

## 2022-02-15 RX ADMIN — ZINC SULFATE 220 MG (50 MG) CAPSULE 220 MG: CAPSULE at 08:23

## 2022-02-15 RX ADMIN — Medication 3 ML: at 08:23

## 2022-02-15 RX ADMIN — BUDESONIDE AND FORMOTEROL FUMARATE DIHYDRATE 2 PUFF: 160; 4.5 AEROSOL RESPIRATORY (INHALATION) at 19:00

## 2022-02-15 RX ADMIN — GUAIFENESIN 600 MG: 600 TABLET, EXTENDED RELEASE ORAL at 20:23

## 2022-02-15 RX ADMIN — GUAIFENESIN 600 MG: 600 TABLET, EXTENDED RELEASE ORAL at 08:23

## 2022-02-15 RX ADMIN — ALBUTEROL SULFATE 2 PUFF: 108 INHALANT RESPIRATORY (INHALATION) at 15:20

## 2022-02-15 RX ADMIN — HYDRALAZINE HYDROCHLORIDE 25 MG: 25 TABLET, FILM COATED ORAL at 20:23

## 2022-02-15 RX ADMIN — ALBUTEROL SULFATE 2 PUFF: 108 INHALANT RESPIRATORY (INHALATION) at 11:50

## 2022-02-15 RX ADMIN — ENOXAPARIN SODIUM 40 MG: 40 INJECTION SUBCUTANEOUS at 15:36

## 2022-02-15 RX ADMIN — Medication 3 ML: at 20:23

## 2022-02-15 NOTE — CASE MANAGEMENT/SOCIAL WORK
Continued Stay Note  [facility]     Patient Name: Iván Fowler  MRN: [id number]  Today's Date: 2/15/2022    Admit Date: 2/13/2022     Discharge Plan     Row Name 02/15/22 1456       Plan    Plan Comments CM returned call from  with Callie Orozco to provide update on condition and dc planning.              Phone communication or documentation only - no physical contact with patient or family.    Richa Bajwa RN     Office Phone: [phone]  Office Cell: [phone]

## 2022-02-15 NOTE — PLAN OF CARE
Problem: Adult Inpatient Plan of Care  Goal: Absence of Hospital-Acquired Illness or Injury  Intervention: Identify and Manage Fall Risk  Recent Flowsheet Documentation  Taken 2/15/2022 1400 by Terri Rosenberg RN  Safety Promotion/Fall Prevention: safety round/check completed  Taken 2/15/2022 1300 by Terri Rosenberg RN  Safety Promotion/Fall Prevention:   assistive device/personal items within reach   clutter free environment maintained   safety round/check completed   nonskid shoes/slippers when out of bed  Taken 2/15/2022 1200 by Terri Rosenberg RN  Safety Promotion/Fall Prevention:   assistive device/personal items within reach   clutter free environment maintained   safety round/check completed   nonskid shoes/slippers when out of bed  Taken 2/15/2022 0825 by Terri Rosenberg RN  Safety Promotion/Fall Prevention:   assistive device/personal items within reach   clutter free environment maintained   safety round/check completed   nonskid shoes/slippers when out of bed  Taken 2/15/2022 0701 by Terri Rosenberg RN  Safety Promotion/Fall Prevention:   assistive device/personal items within reach   clutter free environment maintained   safety round/check completed   nonskid shoes/slippers when out of bed  Intervention: Prevent Skin Injury  Recent Flowsheet Documentation  Taken 2/15/2022 1200 by Terri Rosenberg RN  Body Position:   position maintained   position changed independently  Taken 2/15/2022 0825 by Terri Rosenberg RN  Body Position:   position maintained   position changed independently   supine  Intervention: Prevent and Manage VTE (venous thromboembolism) Risk  Recent Flowsheet Documentation  Taken 2/15/2022 0825 by Terri Rosenberg RN  VTE Prevention/Management: (Lovenox 40mg daily) other (see comments)  Intervention: Prevent Infection  Recent Flowsheet Documentation  Taken 2/15/2022 1600 by Terri Rosenberg RN  Infection Prevention:   cohorting utilized   visitors  restricted/screened  Taken 2/15/2022 1300 by Terri Rosenberg RN  Infection Prevention:   cohorting utilized   visitors restricted/screened   single patient room provided  Taken 2/15/2022 1200 by Terri Rosenberg RN  Infection Prevention:   visitors restricted/screened   cohorting utilized   personal protective equipment utilized  Taken 2/15/2022 0701 by Terri Rosenberg RN  Infection Prevention:   cohorting utilized   visitors restricted/screened   single patient room provided   rest/sleep promoted  Goal: Optimal Comfort and Wellbeing  Intervention: Provide Person-Centered Care  Recent Flowsheet Documentation  Taken 2/15/2022 1200 by Terri Rosenberg RN  Trust Relationship/Rapport: care explained  Taken 2/15/2022 0825 by Terri Rosenberg RN  Trust Relationship/Rapport: care explained     Problem: Breathing Pattern Ineffective  Goal: Effective Breathing Pattern  Intervention: Promote Improved Breathing Pattern  Recent Flowsheet Documentation  Taken 2/15/2022 1200 by Terri Rosenberg RN  Head of Bed (HOB): HOB elevated  Taken 2/15/2022 0825 by Terri Rosenberg RN  Head of Bed (HOB): HOB elevated   Goal Outcome Evaluation:

## 2022-02-15 NOTE — PLAN OF CARE
Goal Outcome Evaluation:  Patient did well overnight, he has remained above 96% on room air. He has no complaints.

## 2022-02-15 NOTE — PROGRESS NOTES
LOS: 2 days   Patient Care Team:  Mat Aiken MD as PCP - General (Family Medicine)    Subjective         Review of Systems   Constitutional: Positive for activity change and fatigue. Negative for appetite change.   HENT: Negative for congestion.    Respiratory: Positive for shortness of breath. Negative for cough.    Gastrointestinal: Negative for abdominal pain, constipation, diarrhea, nausea and vomiting.   Genitourinary: Negative.    Musculoskeletal: Negative.    Neurological: Negative for dizziness, syncope and headaches.   Psychiatric/Behavioral: Negative.            Objective     Vital Signs  Temp:  [97 °F (36.1 °C)-97.7 °F (36.5 °C)] 97.4 °F (36.3 °C)  Heart Rate:  [79-98] 83  Resp:  [16-21] 18  BP: (128-148)/(66-79) 145/79      Physical Exam  Vitals reviewed.   Constitutional:       Appearance: Normal appearance. He is not ill-appearing.   HENT:      Head: Normocephalic and atraumatic.      Right Ear: External ear normal.      Left Ear: External ear normal.      Nose: Nose normal. No congestion.      Mouth/Throat:      Mouth: Mucous membranes are moist.      Pharynx: Oropharynx is clear.   Eyes:      Conjunctiva/sclera: Conjunctivae normal.   Cardiovascular:      Rate and Rhythm: Normal rate and regular rhythm.      Pulses: Normal pulses.      Heart sounds: Normal heart sounds.   Pulmonary:      Effort: Pulmonary effort is normal. No respiratory distress.      Breath sounds: Normal breath sounds.   Abdominal:      General: Bowel sounds are normal. There is no distension.      Palpations: Abdomen is soft.      Tenderness: There is no abdominal tenderness. There is no guarding.   Musculoskeletal:         General: No swelling. Normal range of motion.      Cervical back: Normal range of motion.   Skin:     General: Skin is warm and dry.   Neurological:      Mental Status: He is alert and oriented to person, place, and time.   Psychiatric:         Behavior: Behavior normal.              Results  Review:    Lab Results (last 24 hours)     Procedure Component Value Units Date/Time    Hepatic Function Panel [[id number]]  (Abnormal) Collected: 02/15/22 0507    Specimen: Blood Updated: 02/15/22 0602     Total Protein 5.6 g/dL      Albumin 3.00 g/dL      ALT (SGPT) 23 U/L      AST (SGOT) 33 U/L      Alkaline Phosphatase 35 U/L      Total Bilirubin 0.2 mg/dL      Bilirubin, Direct <0.2 mg/dL      Bilirubin, Indirect --     Comment: Unable to calculate       Basic Metabolic Panel [[id number]]  (Abnormal) Collected: 02/15/22 0507    Specimen: Blood Updated: 02/15/22 0602     Glucose 103 mg/dL      BUN 26 mg/dL      Creatinine 1.02 mg/dL      Sodium 145 mmol/L      Potassium 4.1 mmol/L      Chloride 110 mmol/L      CO2 24.0 mmol/L      Calcium 8.9 mg/dL      eGFR Non African Amer 72 mL/min/1.73      BUN/Creatinine Ratio 25.5     Anion Gap 11.0 mmol/L     Narrative:      GFR Normal >60  Chronic Kidney Disease <60  Kidney Failure <15      D-dimer, Quantitative [[id number]]  (Normal) Collected: 02/15/22 0507    Specimen: Blood Updated: 02/15/22 0553     D-Dimer, Quantitative 0.46 mg/L (FEU)     Narrative:      Reference Range  --------------------------------------------------------------------     < 0.50   Negative Predictive Value  0.50-0.59   Indeterminate    >= 0.60   Probable VTE             A very low percentage of patients with DVT may yield D-Dimer results   below the cut-off of 0.50 mg/L FEU.  This is known to be more   prevalent in patients with distal DVT.             Results of this test should always be interpreted in conjunction with   the patient's medical history, clinical presentation and other   findings.  Clinical diagnosis should not be based on the result of   [place] D-Dimer alone.    CBC & Differential [[id number]]  (Abnormal) Collected: 02/15/22 0507    Specimen: Blood Updated: 02/15/22 0533    Narrative:      The following orders were created for panel order CBC & Differential.  Procedure                                Abnormality         Status                     ---------                               -----------         ------                     CBC Auto Differential[[id number]]        Abnormal            Final result                 Please view results for these tests on the individual orders.    CBC Auto Differential [[id number]]  (Abnormal) Collected: 02/15/22 0507    Specimen: Blood Updated: 02/15/22 0533     WBC 4.90 10*3/mm3      RBC 3.02 10*6/mm3      Hemoglobin 9.0 g/dL      Hematocrit 25.4 %      MCV 84.0 fL      MCH 29.9 pg      MCHC 35.6 g/dL      RDW 13.5 %      RDW-SD 39.8 fl      MPV 7.6 fL      Platelets 270 10*3/mm3      Neutrophil % 88.7 %      Lymphocyte % 2.3 %      Monocyte % 8.9 %      Eosinophil % 0.0 %      Basophil % 0.1 %      Neutrophils, Absolute 4.30 10*3/mm3      Lymphocytes, Absolute 0.10 10*3/mm3      Monocytes, Absolute 0.40 10*3/mm3      Eosinophils, Absolute 0.00 10*3/mm3      Basophils, Absolute 0.00 10*3/mm3      nRBC 0.2 /100 WBC     Blood Culture - Blood, Hand, Right [[id number]]  (Normal) Collected: 02/13/22 1535    Specimen: Blood from Hand, Right Updated: 02/14/22 1545     Blood Culture No growth at 24 hours    Blood Culture - Blood, Arm, Left [[id number]]  (Normal) Collected: 02/13/22 1535    Specimen: Blood from Arm, Left Updated: 02/14/22 1545     Blood Culture No growth at 24 hours           Imaging Results (Last 24 Hours)     ** No results found for the last 24 hours. **               I reviewed the patient's new clinical results.    Medication Review:   Scheduled Meds:albuterol sulfate HFA, 2 puff, Inhalation, 4x Daily - RT  budesonide-formoterol, 2 puff, Inhalation, BID - RT  dexamethasone, 6 mg, Oral, Daily With Breakfast  enoxaparin, 40 mg, Subcutaneous, Daily  finasteride, 5 mg, Oral, Nightly  guaiFENesin, 600 mg, Oral, Q12H  hydrALAZINE, 25 mg, Oral, Q12H  remdesivir, 100 mg, Intravenous, Q24H  sodium chloride, 3 mL, Intravenous, Q12H  zinc sulfate, 220 mg,  Oral, Daily      Continuous Infusions:Pharmacy Consult - Remdesivir,       PRN Meds:.•  acetaminophen  •  nitroglycerin  •  ondansetron  •  Pharmacy Consult - Remdesivir  •  potassium chloride  •  potassium chloride  •  [COMPLETED] Insert peripheral IV **AND** sodium chloride  •  sodium chloride     Interval History:    Assessment/Plan       Acute respiratory distress  COVID pneumonia - starting remdesivir, steroids, bronchodilators, mucinex, dvt prophylaxis;  Acute hypoxemia - supplemental oxygen as needed  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

## 2022-02-15 NOTE — CASE MANAGEMENT/SOCIAL WORK
Continued Stay Note  [facility]     Patient Name: Iván Fowler  MRN: [id number]  Today's Date: 2/15/2022    Admit Date: 2/13/2022     Discharge Plan     Row Name 02/15/22 1322       Plan    Plan Dc Plan: Anticipate routine to home with spouse.    Patient/Family in Agreement with Plan yes    Plan Comments Callie Gasca CM for patient's [employer] insurance can be reached  at [phone] ext. [phone]. DC Barriers: IV remdesivir            Phone communication or documentation only- no physical contact with patient or family.    Marlen Anderson RN     Office Phone: [phone]  Office Cell: [phone]

## 2022-02-16 VITALS
HEART RATE: 101 BPM | SYSTOLIC BLOOD PRESSURE: 148 MMHG | RESPIRATION RATE: 21 BRPM | BODY MASS INDEX: 26.01 KG/M2 | HEIGHT: 72 IN | OXYGEN SATURATION: 97 % | TEMPERATURE: 96.8 F | DIASTOLIC BLOOD PRESSURE: 76 MMHG | WEIGHT: 192.02 LBS

## 2022-02-16 PROBLEM — R09.02 HYPOXIA: Status: ACTIVE | Noted: 2022-02-16

## 2022-02-16 PROBLEM — E78.2 MIXED HYPERLIPIDEMIA: Status: ACTIVE | Noted: 2022-02-16

## 2022-02-16 PROBLEM — C82.90 FOLLICULAR LYMPHOMA: Status: ACTIVE | Noted: 2022-02-16

## 2022-02-16 PROBLEM — J12.82 PNEUMONIA DUE TO COVID-19 VIRUS: Status: ACTIVE | Noted: 2022-02-16

## 2022-02-16 PROBLEM — D89.832 CYTOKINE RELEASE SYNDROME, GRADE 2: Status: ACTIVE | Noted: 2022-02-16

## 2022-02-16 PROBLEM — I10 ESSENTIAL HYPERTENSION: Status: ACTIVE | Noted: 2022-02-16

## 2022-02-16 PROBLEM — U07.1 PNEUMONIA DUE TO COVID-19 VIRUS: Status: ACTIVE | Noted: 2022-02-16

## 2022-02-16 LAB
ALBUMIN SERPL-MCNC: 2.9 G/DL (ref 3.5–5.2)
ALP SERPL-CCNC: 35 U/L (ref 39–117)
ALT SERPL W P-5'-P-CCNC: 26 U/L (ref 1–41)
ANION GAP SERPL CALCULATED.3IONS-SCNC: 8 MMOL/L (ref 5–15)
AST SERPL-CCNC: 28 U/L (ref 1–40)
BASOPHILS # BLD AUTO: 0 10*3/MM3 (ref 0–0.2)
BASOPHILS NFR BLD AUTO: 0.1 % (ref 0–1.5)
BILIRUB CONJ SERPL-MCNC: <0.2 MG/DL (ref 0–0.3)
BILIRUB INDIRECT SERPL-MCNC: ABNORMAL MG/DL
BILIRUB SERPL-MCNC: 0.2 MG/DL (ref 0–1.2)
BUN SERPL-MCNC: 27 MG/DL (ref 8–23)
BUN/CREAT SERPL: 26.7 (ref 7–25)
CALCIUM SPEC-SCNC: 8.8 MG/DL (ref 8.6–10.5)
CHLORIDE SERPL-SCNC: 112 MMOL/L (ref 98–107)
CO2 SERPL-SCNC: 24 MMOL/L (ref 22–29)
CREAT SERPL-MCNC: 1.01 MG/DL (ref 0.76–1.27)
D DIMER PPP FEU-MCNC: 0.56 MG/L (FEU) (ref 0–0.59)
DEPRECATED RDW RBC AUTO: 40.3 FL (ref 37–54)
EOSINOPHIL # BLD AUTO: 0 10*3/MM3 (ref 0–0.4)
EOSINOPHIL NFR BLD AUTO: 0 % (ref 0.3–6.2)
ERYTHROCYTE [DISTWIDTH] IN BLOOD BY AUTOMATED COUNT: 13.3 % (ref 12.3–15.4)
GFR SERPL CREATININE-BSD FRML MDRD: 73 ML/MIN/1.73
GLUCOSE SERPL-MCNC: 92 MG/DL (ref 65–99)
HCT VFR BLD AUTO: 25.8 % (ref 37.5–51)
HGB BLD-MCNC: 8.7 G/DL (ref 13–17.7)
LYMPHOCYTES # BLD AUTO: 0.1 10*3/MM3 (ref 0.7–3.1)
LYMPHOCYTES NFR BLD AUTO: 2.8 % (ref 19.6–45.3)
MCH RBC QN AUTO: 28.7 PG (ref 26.6–33)
MCHC RBC AUTO-ENTMCNC: 33.7 G/DL (ref 31.5–35.7)
MCV RBC AUTO: 85.1 FL (ref 79–97)
MONOCYTES # BLD AUTO: 0.5 10*3/MM3 (ref 0.1–0.9)
MONOCYTES NFR BLD AUTO: 11.1 % (ref 5–12)
NEUTROPHILS NFR BLD AUTO: 4.1 10*3/MM3 (ref 1.7–7)
NEUTROPHILS NFR BLD AUTO: 86 % (ref 42.7–76)
NRBC BLD AUTO-RTO: 0 /100 WBC (ref 0–0.2)
PLATELET # BLD AUTO: 277 10*3/MM3 (ref 140–450)
PMV BLD AUTO: 7.4 FL (ref 6–12)
POTASSIUM SERPL-SCNC: 3.8 MMOL/L (ref 3.5–5.2)
PROT SERPL-MCNC: 5.3 G/DL (ref 6–8.5)
RBC # BLD AUTO: 3.03 10*6/MM3 (ref 4.14–5.8)
SODIUM SERPL-SCNC: 144 MMOL/L (ref 136–145)
WBC NRBC COR # BLD: 4.8 10*3/MM3 (ref 3.4–10.8)

## 2022-02-16 PROCEDURE — 85379 FIBRIN DEGRADATION QUANT: CPT | Performed by: INTERNAL MEDICINE

## 2022-02-16 PROCEDURE — 94799 UNLISTED PULMONARY SVC/PX: CPT

## 2022-02-16 PROCEDURE — 63710000001 DEXAMETHASONE PER 0.25 MG: Performed by: NURSE PRACTITIONER

## 2022-02-16 PROCEDURE — 85025 COMPLETE CBC W/AUTO DIFF WBC: CPT | Performed by: INTERNAL MEDICINE

## 2022-02-16 PROCEDURE — 25010000002 REMDESIVIR 100 MG/20ML SOLUTION 1 EACH VIAL: Performed by: INTERNAL MEDICINE

## 2022-02-16 PROCEDURE — 80048 BASIC METABOLIC PNL TOTAL CA: CPT | Performed by: INTERNAL MEDICINE

## 2022-02-16 PROCEDURE — 80076 HEPATIC FUNCTION PANEL: CPT | Performed by: INTERNAL MEDICINE

## 2022-02-16 PROCEDURE — 25010000002 HEPARIN LOCK FLUSH PER 10 UNITS: Performed by: INTERNAL MEDICINE

## 2022-02-16 RX ORDER — HEPARIN SODIUM (PORCINE) LOCK FLUSH IV SOLN 100 UNIT/ML 100 UNIT/ML
500 SOLUTION INTRAVENOUS ONCE
Status: COMPLETED | OUTPATIENT
Start: 2022-02-16 | End: 2022-02-16

## 2022-02-16 RX ORDER — ACETAMINOPHEN 325 MG/1
650 TABLET ORAL EVERY 4 HOURS PRN
Start: 2022-02-16

## 2022-02-16 RX ORDER — BUDESONIDE AND FORMOTEROL FUMARATE DIHYDRATE 80; 4.5 UG/1; UG/1
2 AEROSOL RESPIRATORY (INHALATION)
Qty: 1 EACH | Refills: 12 | Status: SHIPPED | OUTPATIENT
Start: 2022-02-16 | End: 2022-06-05 | Stop reason: HOSPADM

## 2022-02-16 RX ORDER — ALBUTEROL SULFATE 90 UG/1
2 AEROSOL, METERED RESPIRATORY (INHALATION)
Qty: 18 G | Refills: 0 | Status: SHIPPED | OUTPATIENT
Start: 2022-02-16 | End: 2022-06-05 | Stop reason: HOSPADM

## 2022-02-16 RX ORDER — DEXAMETHASONE 2 MG/1
TABLET ORAL
Qty: 8 TABLET | Refills: 0 | Status: ON HOLD | OUTPATIENT
Start: 2022-02-16 | End: 2022-06-03

## 2022-02-16 RX ADMIN — HEPARIN 500 UNITS: 100 SYRINGE at 14:09

## 2022-02-16 RX ADMIN — ALBUTEROL SULFATE 2 PUFF: 108 INHALANT RESPIRATORY (INHALATION) at 11:06

## 2022-02-16 RX ADMIN — BUDESONIDE AND FORMOTEROL FUMARATE DIHYDRATE 2 PUFF: 160; 4.5 AEROSOL RESPIRATORY (INHALATION) at 06:48

## 2022-02-16 RX ADMIN — GUAIFENESIN 600 MG: 600 TABLET, EXTENDED RELEASE ORAL at 08:22

## 2022-02-16 RX ADMIN — ALBUTEROL SULFATE 2 PUFF: 108 INHALANT RESPIRATORY (INHALATION) at 06:48

## 2022-02-16 RX ADMIN — HYDRALAZINE HYDROCHLORIDE 25 MG: 25 TABLET, FILM COATED ORAL at 08:22

## 2022-02-16 RX ADMIN — DEXAMETHASONE 4 MG: 4 TABLET ORAL at 08:22

## 2022-02-16 RX ADMIN — Medication 3 ML: at 08:22

## 2022-02-16 RX ADMIN — REMDESIVIR 100 MG: 100 INJECTION, POWDER, LYOPHILIZED, FOR SOLUTION INTRAVENOUS at 12:56

## 2022-02-16 RX ADMIN — ZINC SULFATE 220 MG (50 MG) CAPSULE 220 MG: CAPSULE at 08:22

## 2022-02-16 NOTE — DISCHARGE SUMMARY
Date of Discharge:  2/16/2022    Discharge Diagnosis:   **Acute respiratory distress [R06.03]   Pneumonia due to COVID-19 virus [U07.1, J12.82]   Hypoxia [R09.02]   Follicular lymphoma (HCC) [C82.90]   Essential hypertension [I10]   Mixed hyperlipidemia [E78.2]   Cytokine release syndrome, grade 2 [D89.832]       Presenting Problem/History of Present Illness  Active Hospital Problems    Diagnosis  POA   • **Acute respiratory distress [R06.03]  Yes   • Pneumonia due to COVID-19 virus [U07.1, J12.82]  Yes   • Hypoxia [R09.02]  Yes   • Follicular lymphoma (HCC) [C82.90]  Yes   • Essential hypertension [I10]  Yes   • Mixed hyperlipidemia [E78.2]  Yes   • Cytokine release syndrome, grade 2 [D89.832]  No      Resolved Hospital Problems   No resolved problems to display.          Hospital Course  Patient is a 71 y.o. male with h/o follicular lymphoma on maintenance chemotherapy who presented with progressive shortness of breath for 4 to 5 days.  He had tested positive for Covid on February 9 and was being treated as an outpatient with steroids and an inhaler.  He was fully vaccinated.  In the ER he was mildly hypoxic.  Given his immunosuppressed status and need for oxygen he was admitted.  He was treated with remdesivir, steroids, Mucinex, bronchodilators, zinc, incentive spirometry, supplemental oxygen, albuterol.  He improved nicely and was able to wean to room air on 2/14.  Initial plan had been to complete 5 days of remdesivir due to his immunosuppressed status.  However, he did so well that we have decided to finish the treatment with only 4 doses.  He is anticipating a new grandchild tomorrow and is eager to be discharged from the hospital.  At the time of discharge he looks well.  His vital signs are stable.  He is able to ambulate around the room without any shortness of breath.  He has a routine follow-up appointment scheduled with Dr. Adams later this month, which he will keep.  He is being discharged home to  taper oral steroids and continue Symbicort.    Procedures Performed         Consults:   Consults     Date and Time Order Name Status Description    2/13/2022  4:22 PM Family Medicine Consult            Pertinent Test Results:    Lab Results (most recent)     Procedure Component Value Units Date/Time    D-dimer, Quantitative [[id number]]  (Normal) Collected: 02/16/22 0527    Specimen: Blood Updated: 02/16/22 0632     D-Dimer, Quantitative 0.56 mg/L (FEU)     Narrative:      Reference Range  --------------------------------------------------------------------     < 0.50   Negative Predictive Value  0.50-0.59   Indeterminate    >= 0.60   Probable VTE             A very low percentage of patients with DVT may yield D-Dimer results   below the cut-off of 0.50 mg/L FEU.  This is known to be more   prevalent in patients with distal DVT.             Results of this test should always be interpreted in conjunction with   the patient's medical history, clinical presentation and other   findings.  Clinical diagnosis should not be based on the result of   INNOVANCE D-Dimer alone.    Basic Metabolic Panel [[id number]]  (Abnormal) Collected: 02/16/22 0527    Specimen: Blood Updated: 02/16/22 0610     Glucose 92 mg/dL      BUN 27 mg/dL      Creatinine 1.01 mg/dL      Sodium 144 mmol/L      Potassium 3.8 mmol/L      Chloride 112 mmol/L      CO2 24.0 mmol/L      Calcium 8.8 mg/dL      eGFR Non African Amer 73 mL/min/1.73      BUN/Creatinine Ratio 26.7     Anion Gap 8.0 mmol/L     Narrative:      GFR Normal >60  Chronic Kidney Disease <60  Kidney Failure <15      Hepatic Function Panel [[id number]]  (Abnormal) Collected: 02/16/22 0527    Specimen: Blood Updated: 02/16/22 0610     Total Protein 5.3 g/dL      Albumin 2.90 g/dL      ALT (SGPT) 26 U/L      AST (SGOT) 28 U/L      Alkaline Phosphatase 35 U/L      Total Bilirubin 0.2 mg/dL      Bilirubin, Direct <0.2 mg/dL      Bilirubin, Indirect --     Comment: Unable to calculate       CBC  & Differential [[id number]]  (Abnormal) Collected: 02/16/22 0527    Specimen: Blood Updated: 02/16/22 0559    Narrative:      The following orders were created for panel order CBC & Differential.  Procedure                               Abnormality         Status                     ---------                               -----------         ------                     CBC Auto Differential[[id number]]        Abnormal            Final result                 Please view results for these tests on the individual orders.    CBC Auto Differential [[id number]]  (Abnormal) Collected: 02/16/22 0527    Specimen: Blood Updated: 02/16/22 0559     WBC 4.80 10*3/mm3      RBC 3.03 10*6/mm3      Hemoglobin 8.7 g/dL      Hematocrit 25.8 %      MCV 85.1 fL      MCH 28.7 pg      MCHC 33.7 g/dL      RDW 13.3 %      RDW-SD 40.3 fl      MPV 7.4 fL      Platelets 277 10*3/mm3      Neutrophil % 86.0 %      Lymphocyte % 2.8 %      Monocyte % 11.1 %      Eosinophil % 0.0 %      Basophil % 0.1 %      Neutrophils, Absolute 4.10 10*3/mm3      Lymphocytes, Absolute 0.10 10*3/mm3      Monocytes, Absolute 0.50 10*3/mm3      Eosinophils, Absolute 0.00 10*3/mm3      Basophils, Absolute 0.00 10*3/mm3      nRBC 0.0 /100 WBC     Blood Culture - Blood, Hand, Right [[id number]]  (Normal) Collected: 02/13/22 1535    Specimen: Blood from Hand, Right Updated: 02/15/22 1545     Blood Culture No growth at 2 days    Blood Culture - Blood, Arm, Left [[id number]]  (Normal) Collected: 02/13/22 1535    Specimen: Blood from Arm, Left Updated: 02/15/22 1545     Blood Culture No growth at 2 days    Hepatic Function Panel [[id number]]  (Abnormal) Collected: 02/15/22 0507    Specimen: Blood Updated: 02/15/22 0602     Total Protein 5.6 g/dL      Albumin 3.00 g/dL      ALT (SGPT) 23 U/L      AST (SGOT) 33 U/L      Alkaline Phosphatase 35 U/L      Total Bilirubin 0.2 mg/dL      Bilirubin, Direct <0.2 mg/dL      Bilirubin, Indirect --     Comment: Unable to calculate        Basic Metabolic Panel [[id number]]  (Abnormal) Collected: 02/15/22 0507    Specimen: Blood Updated: 02/15/22 0602     Glucose 103 mg/dL      BUN 26 mg/dL      Creatinine 1.02 mg/dL      Sodium 145 mmol/L      Potassium 4.1 mmol/L      Chloride 110 mmol/L      CO2 24.0 mmol/L      Calcium 8.9 mg/dL      eGFR Non African Amer 72 mL/min/1.73      BUN/Creatinine Ratio 25.5     Anion Gap 11.0 mmol/L     Narrative:      GFR Normal >60  Chronic Kidney Disease <60  Kidney Failure <15      D-dimer, Quantitative [[id number]]  (Normal) Collected: 02/15/22 0507    Specimen: Blood Updated: 02/15/22 0553     D-Dimer, Quantitative 0.46 mg/L (FEU)     Narrative:      Reference Range  --------------------------------------------------------------------     < 0.50   Negative Predictive Value  0.50-0.59   Indeterminate    >= 0.60   Probable VTE             A very low percentage of patients with DVT may yield D-Dimer results   below the cut-off of 0.50 mg/L FEU.  This is known to be more   prevalent in patients with distal DVT.             Results of this test should always be interpreted in conjunction with   the patient's medical history, clinical presentation and other   findings.  Clinical diagnosis should not be based on the result of   INNOVANCE D-Dimer alone.    CBC & Differential [[id number]]  (Abnormal) Collected: 02/15/22 0507    Specimen: Blood Updated: 02/15/22 0533    Narrative:      The following orders were created for panel order CBC & Differential.  Procedure                               Abnormality         Status                     ---------                               -----------         ------                     CBC Auto Differential[[id number]]        Abnormal            Final result                 Please view results for these tests on the individual orders.    CBC Auto Differential [[id number]]  (Abnormal) Collected: 02/15/22 0507    Specimen: Blood Updated: 02/15/22 0533     WBC 4.90 10*3/mm3      RBC 3.02  10*6/mm3      Hemoglobin 9.0 g/dL      Hematocrit 25.4 %      MCV 84.0 fL      MCH 29.9 pg      MCHC 35.6 g/dL      RDW 13.5 %      RDW-SD 39.8 fl      MPV 7.6 fL      Platelets 270 10*3/mm3      Neutrophil % 88.7 %      Lymphocyte % 2.3 %      Monocyte % 8.9 %      Eosinophil % 0.0 %      Basophil % 0.1 %      Neutrophils, Absolute 4.30 10*3/mm3      Lymphocytes, Absolute 0.10 10*3/mm3      Monocytes, Absolute 0.40 10*3/mm3      Eosinophils, Absolute 0.00 10*3/mm3      Basophils, Absolute 0.00 10*3/mm3      nRBC 0.2 /100 WBC     Manual Differential [[id number]]  (Abnormal) Collected: 02/14/22 0543    Specimen: Blood Updated: 02/14/22 0704     Neutrophil % 84.0 %      Lymphocyte % 7.0 %      Monocyte % 7.0 %      Bands %  1.0 %      Metamyelocyte % 1.0 %      Neutrophils Absolute 1.79 10*3/mm3      Lymphocytes Absolute 0.15 10*3/mm3      Monocytes Absolute 0.15 10*3/mm3      Elliptocytes Slight/1+     WBC Morphology Normal     Platelet Morphology Normal    Scan Slide [[id number]] Collected: 02/14/22 0543    Specimen: Blood Updated: 02/14/22 0704     Scan Slide --     Comment: See Manual Differential Results       Creatinine, Serum [[id number]]  (Normal) Collected: 02/13/22 1724    Specimen: Blood Updated: 02/13/22 1754     Creatinine 1.03 mg/dL      eGFR Non African Amer 72 mL/min/1.73     Narrative:      GFR Normal >60  Chronic Kidney Disease <60  Kidney Failure <15      Comprehensive Metabolic Panel [[id number]]  (Abnormal) Collected: 02/13/22 1535    Specimen: Blood Updated: 02/13/22 1603     Glucose 108 mg/dL      BUN 17 mg/dL      Creatinine 1.18 mg/dL      Sodium 139 mmol/L      Potassium 3.3 mmol/L      Chloride 104 mmol/L      CO2 23.0 mmol/L      Calcium 9.0 mg/dL      Total Protein 6.0 g/dL      Albumin 3.20 g/dL      ALT (SGPT) 28 U/L      AST (SGOT) 41 U/L      Alkaline Phosphatase 38 U/L      Total Bilirubin 0.3 mg/dL      eGFR Non African Amer 61 mL/min/1.73      Globulin 2.8 gm/dL      A/G Ratio 1.1 g/dL       BUN/Creatinine Ratio 14.4     Anion Gap 12.0 mmol/L     Narrative:      GFR Normal >60  Chronic Kidney Disease <60  Kidney Failure <15      Troponin [[id number]]  (Normal) Collected: 02/13/22 1535    Specimen: Blood Updated: 02/13/22 1603     Troponin T <0.010 ng/mL     Narrative:      Troponin T Reference Range:  <= 0.03 ng/mL-   Negative for AMI  >0.03 ng/mL-     Abnormal for myocardial necrosis.  Clinicians would have to utilize clinical acumen, EKG, Troponin and serial changes to determine if it is an Acute Myocardial Infarction or myocardial injury due to an underlying chronic condition.       Results may be falsely decreased if patient taking Biotin.      Urinalysis With Microscopic If Indicated (No Culture) - Urine, Clean Catch [[id number]]  (Abnormal) Collected: 02/13/22 1542    Specimen: Urine, Clean Catch Updated: 02/13/22 1554     Color, UA Yellow     Appearance, UA Clear     pH, UA 6.5     Specific Gravity, UA 1.016     Glucose, UA Negative     Ketones, UA Negative     Bilirubin, UA Negative     Blood, UA Small (1+)     Protein, UA 30 mg/dL (1+)     Leuk Esterase, UA Negative     Nitrite, UA Negative     Urobilinogen, UA 1.0 E.U./dL    Urinalysis, Microscopic Only - Urine, Clean Catch [[id number]]  (Abnormal) Collected: 02/13/22 1542    Specimen: Urine, Clean Catch Updated: 02/13/22 1554     RBC, UA 13-20 /HPF      WBC, UA 0-2 /HPF      Bacteria, UA None Seen /HPF      Squamous Epithelial Cells, UA 0-2 /HPF      Hyaline Casts, UA None Seen /LPF      Methodology Automated Microscopy    POC Lactate [[id number]]  (Normal) Collected: 02/13/22 1540    Specimen: Blood Updated: 02/13/22 1541     Lactate 0.9 mmol/L      Comment: Serial Number: [id number]:  [id number]                           Condition on Discharge:  Stable    Vital Signs  Temp:  [97.4 °F (36.3 °C)-98.2 °F (36.8 °C)] 97.6 °F (36.4 °C)  Heart Rate:  [] 95  Resp:  [17-25] 20  BP: (125-153)/(60-77) 153/77    Physical Exam:     General  Appearance:    Alert, cooperative, in no acute distress   Head:    Normocephalic, without obvious abnormality, atraumatic   Eyes:            Lids and lashes normal, conjunctivae and sclerae normal, no   icterus, no pallor, corneas clear, PERRLA   Ears:    Ears appear intact with no abnormalities noted   Throat:   No oral lesions, no thrush, oral mucosa moist   Neck:   No adenopathy, supple, trachea midline, no thyromegaly, no   carotid bruit, no JVD   Lungs:    Few rhonchi left lower lobe, otherwise clear    Heart:    Regular rhythm and normal rate, normal S1 and S2, no            murmur, no gallop, no rub, no click   Chest Wall:    No abnormalities observed   Abdomen:     Normal bowel sounds, no masses, no organomegaly, soft        non-tender, non-distended, no guarding, no rebound                tenderness   Extremities:   Moves all extremities well, no edema, no cyanosis, no             redness   Pulses:   Pulses palpable and equal bilaterally   Skin:   No bleeding, bruising or rash   Lymph nodes:   No palpable adenopathy   Neurologic:   Cranial nerves 2 - 12 grossly intact, sensation intact, DTR       present and equal bilaterally       Discharge Disposition  Home or Self Care    Discharge Medications     Discharge Medications      New Medications      Instructions Start Date   acetaminophen 325 MG tablet  Commonly known as: TYLENOL   650 mg, Oral, Every 4 Hours PRN      albuterol sulfate  (90 Base) MCG/ACT inhaler  Commonly known as: PROVENTIL HFA;VENTOLIN HFA;PROAIR HFA   2 puffs, Inhalation, 4 Times Daily - RT      budesonide-formoterol 80-4.5 MCG/ACT inhaler  Commonly known as: Symbicort   2 puffs, Inhalation, 2 Times Daily - RT      dexamethasone 2 MG tablet  Commonly known as: DECADRON   4 mg po q day x 2, 2 mg po q day x 4 then stop         Continue These Medications      Instructions Start Date   fenofibrate 145 MG tablet  Commonly known as: TRICOR   145 mg, Oral, Daily      finasteride 5 MG  tablet  Commonly known as: PROSCAR   5 mg, Oral, Nightly      hydrALAZINE 25 MG tablet  Commonly known as: APRESOLINE   25 mg, Oral, 2 Times Daily             Discharge Diet:     Activity at Discharge:     Follow-up Appointments  No future appointments.  Additional Instructions for the Follow-ups that You Need to Schedule     Discharge Follow-up with PCP   As directed       Currently Documented PCP:    Mat Aikne MD    PCP Phone Number:    [phone]     Follow Up Details: Keep your scheduled appointment with Dr. Aiken.  Call the office if you have any problems in the mean time.               Test Results Pending at Discharge  Pending Labs     Order Current Status    Blood Culture - Blood, Arm, Left Preliminary result    Blood Culture - Blood, Hand, Right Preliminary result           Rebeka Morales MD  02/16/22  11:06 EST    Time: Discharge 25 min

## 2022-02-16 NOTE — CASE MANAGEMENT/SOCIAL WORK
Case Management Discharge Note          Selected Continued Care - Discharged on 2/16/2022 Admission date: 2/13/2022 - Discharge disposition: Home or Self Care     Final Discharge Disposition Code: 01 - home or self-care

## 2022-02-16 NOTE — PLAN OF CARE
Problem: Adult Inpatient Plan of Care  Goal: Plan of Care Review  Outcome: Adequate for Care Transition  Flowsheets (Taken 2/16/2022 1107)  Progress: improving  Plan of Care Reviewed With:   patient   spouse  Outcome Summary: Pt. discharging home today after dose of remdesivir at 1300. Pt. voices no new complaints and remains on room air.  Goal: Patient-Specific Goal (Individualized)  Outcome: Adequate for Care Transition  Goal: Absence of Hospital-Acquired Illness or Injury  Outcome: Adequate for Care Transition  Intervention: Identify and Manage Fall Risk  Recent Flowsheet Documentation  Taken 2/16/2022 0720 by Terri Rosenberg RN  Safety Promotion/Fall Prevention:   assistive device/personal items within reach   clutter free environment maintained   safety round/check completed   nonskid shoes/slippers when out of bed  Intervention: Prevent Skin Injury  Recent Flowsheet Documentation  Taken 2/16/2022 0720 by Terri Rosenberg RN  Body Position:   position maintained   position changed independently  Goal: Optimal Comfort and Wellbeing  Outcome: Adequate for Care Transition  Intervention: Provide Person-Centered Care  Recent Flowsheet Documentation  Taken 2/16/2022 0720 by Terri Rosenberg RN  Trust Relationship/Rapport: care explained  Goal: Readiness for Transition of Care  Outcome: Adequate for Care Transition     Problem: Breathing Pattern Ineffective  Goal: Effective Breathing Pattern  Outcome: Adequate for Care Transition  Intervention: Promote Improved Breathing Pattern  Recent Flowsheet Documentation  Taken 2/16/2022 0720 by Terri Rosenberg RN  Head of Bed (HOB): HOB elevated   Goal Outcome Evaluation:  Plan of Care Reviewed With: patient, spouse        Progress: improving  Outcome Summary: Pt. discharging home today after dose of remdesivir at 1300. Pt. voices no new complaints and remains on room air.

## 2022-02-17 ENCOUNTER — READMISSION MANAGEMENT (OUTPATIENT)
Dept: CALL CENTER | Facility: HOSPITAL | Age: 72
End: 2022-02-17

## 2022-02-17 LAB — QT INTERVAL: 323 MS

## 2022-02-17 NOTE — OUTREACH NOTE
Prep Survey      Responses   [facility]   Is LACE score < 7 ? Yes   Emergency Room discharge w/ pulse ox? No   Eligibility Readm Mgmt   Discharge diagnosis PN r/t Covid,  cytokine release syndrome grade 2   Does the patient have one of the following disease processes/diagnoses(primary or secondary)? COVID-19   Does the patient have Home health ordered? No   Is there a DME ordered? No   Prep survey completed? Yes          Zuly Carlos RN

## 2022-02-17 NOTE — OUTREACH NOTE
COVID-19 Week 1 Survey      Responses   [facility] patient discharged from? [facility]   Does the patient have one of the following disease processes/diagnoses(primary or secondary)? COVID-19   COVID-19 underlying condition? None  [Lymphoma.]   Call Number Call 1   Week 1 Call successful? Yes   Call start time 1121   Call end time 1131   Meds reviewed with patient/caregiver? Yes   Is the patient having any side effects they believe may be caused by any medication additions or changes? No   Does the patient have all medications ordered at discharge? Yes   Is the patient taking all medications as directed (includes completed medication regime)? Yes   Comments regarding appointments PCP appt 02/21/22.   Does the patient have a primary care provider?  Yes   Does the patient have an appointment with their PCP or specialist within 7 days of discharge? Yes   Has the patient kept scheduled appointments due by today? N/A   Has home health visited the patient within 72 hours of discharge? N/A   Psychosocial issues? No   Did the patient receive a copy of their discharge instructions? Yes   Did the patient receive a copy of COVID-19 specific instructions? Yes   Nursing interventions Reviewed instructions with patient   What is the patient's perception of their health status since discharge? Improving   Does the patient have any of the following symptoms? None   Nursing Interventions Nurse provided patient education   Pulse Ox monitoring None   Is the patient/caregiver able to teach back steps to recovery at home? Set small, achievable goals for return to baseline health,  Eat a well-balance diet,  Rest and rebuild strength, gradually increase activity   If the patient is a current smoker, are they able to teach back resources for cessation? Not a smoker   Is the patient/caregiver able to teach back the hierarchy of who to call/visit for symptoms/problems? PCP, Specialist, Home health nurse, Urgent Care, ED, 911 Yes   COVID-19  call completed? Yes   Wrap up additional comments Patient states is doing well. Denies any covid s/s. States received excellent care in the hospital. States will replace toothbrush/paste/razor, and discuss quarantine with PCP. Denies any needs today.          Cely Whiteside RN

## 2022-02-18 ENCOUNTER — READMISSION MANAGEMENT (OUTPATIENT)
Dept: CALL CENTER | Facility: HOSPITAL | Age: 72
End: 2022-02-18

## 2022-02-18 LAB
BACTERIA SPEC AEROBE CULT: NORMAL
BACTERIA SPEC AEROBE CULT: NORMAL

## 2022-02-18 NOTE — OUTREACH NOTE
COVID-19 Week 1 Survey      Responses   [facility] patient discharged from? [facility]   Does the patient have one of the following disease processes/diagnoses(primary or secondary)? COVID-19   COVID-19 underlying condition? None   Call Number Call 2   Week 1 Call successful? No   Discharge diagnosis PN r/t Covid,  cytokine release syndrome grade 2          Daysi Putnam RN

## 2022-02-19 ENCOUNTER — READMISSION MANAGEMENT (OUTPATIENT)
Dept: CALL CENTER | Facility: HOSPITAL | Age: 72
End: 2022-02-19

## 2022-02-19 NOTE — OUTREACH NOTE
COVID-19 Week 1 Survey      Responses   [facility] [place] patient [facility] from? [facility]   Does the patient have one of the following disease processes/diagnoses(primary or secondary)? COVID-19   COVID-19 underlying condition? None   Call Number Call 3   Week 1 Call successful? Yes   Call start time 0946   Call end time 0948   Discharge diagnosis PN r/t Covid,  cytokine release syndrome grade 2   Meds reviewed with patient/caregiver? Yes   Is the patient taking all medications as directed (includes completed medication regime)? Yes   Comments regarding appointments PCP appt 02/21/22.   Psychosocial issues? No   What is the patient's perception of their health status since discharge? Improving   Does the patient have any of the following symptoms? None   Pulse Ox monitoring Intermittent   Pulse Ox device source Patient   O2 Sat comments 96-97% RA   O2 Sat: education provided Sat levels,  Monitoring frequency,  When to seek care   COVID-19 call completed? Yes          Elisabet Lopes RN

## 2022-02-22 ENCOUNTER — READMISSION MANAGEMENT (OUTPATIENT)
Dept: CALL CENTER | Facility: HOSPITAL | Age: 72
End: 2022-02-22

## 2022-02-22 NOTE — OUTREACH NOTE
COVID-19 Week 2 Survey      Responses   [facility] patient discharged from? [facility]   Does the patient have one of the following disease processes/diagnoses(primary or secondary)? COVID-19   COVID-19 underlying condition? None   Call Number Call 1   COVID-19 Week 2: Call 1 attempt successful? No   Discharge diagnosis PN r/t Covid,  cytokine release syndrome grade 2          Anna Goldstein, NANETTEN

## 2022-06-02 ENCOUNTER — APPOINTMENT (OUTPATIENT)
Dept: GENERAL RADIOLOGY | Facility: HOSPITAL | Age: 72
End: 2022-06-02

## 2022-06-02 ENCOUNTER — APPOINTMENT (OUTPATIENT)
Dept: CT IMAGING | Facility: HOSPITAL | Age: 72
End: 2022-06-02

## 2022-06-02 ENCOUNTER — HOSPITAL ENCOUNTER (INPATIENT)
Facility: HOSPITAL | Age: 72
LOS: 3 days | Discharge: HOME | End: 2022-06-05
Attending: EMERGENCY MEDICINE | Admitting: INTERNAL MEDICINE

## 2022-06-02 DIAGNOSIS — I26.93 SINGLE SUBSEGMENTAL PULMONARY EMBOLISM WITHOUT ACUTE COR PULMONALE: ICD-10-CM

## 2022-06-02 DIAGNOSIS — U07.1 COVID-19: ICD-10-CM

## 2022-06-02 DIAGNOSIS — R53.1 WEAKNESS: Primary | ICD-10-CM

## 2022-06-02 DIAGNOSIS — K52.9 ENTERITIS: ICD-10-CM

## 2022-06-02 DIAGNOSIS — K52.9 COLITIS: ICD-10-CM

## 2022-06-02 DIAGNOSIS — I95.9 HYPOTENSION, UNSPECIFIED HYPOTENSION TYPE: ICD-10-CM

## 2022-06-02 PROBLEM — I26.99 PULMONARY EMBOLISM: Status: ACTIVE | Noted: 2022-06-02

## 2022-06-02 LAB
ALBUMIN SERPL-MCNC: 3.8 G/DL (ref 3.5–5.2)
ALBUMIN/GLOB SERPL: 1.6 G/DL
ALP SERPL-CCNC: 48 U/L (ref 39–117)
ALT SERPL W P-5'-P-CCNC: 12 U/L (ref 1–41)
ANION GAP SERPL CALCULATED.3IONS-SCNC: 16 MMOL/L (ref 5–15)
APTT PPP: 65 SECONDS (ref 61–76.5)
AST SERPL-CCNC: 18 U/L (ref 1–40)
BILIRUB SERPL-MCNC: 0.3 MG/DL (ref 0–1.2)
BILIRUB UR QL STRIP: NEGATIVE
BUN SERPL-MCNC: 26 MG/DL (ref 8–23)
BUN/CREAT SERPL: 14.1 (ref 7–25)
CALCIUM SPEC-SCNC: 9.3 MG/DL (ref 8.6–10.5)
CHLORIDE SERPL-SCNC: 104 MMOL/L (ref 98–107)
CK SERPL-CCNC: 59 U/L (ref 20–200)
CLARITY UR: CLEAR
CO2 SERPL-SCNC: 19 MMOL/L (ref 22–29)
COLOR UR: ABNORMAL
CORTIS SERPL-MCNC: 47.52 MCG/DL
CREAT SERPL-MCNC: 1.84 MG/DL (ref 0.76–1.27)
D DIMER PPP FEU-MCNC: >35.2 MG/L (FEU) (ref 0–0.59)
D-LACTATE SERPL-SCNC: 0.8 MMOL/L (ref 0.5–2)
DEPRECATED RDW RBC AUTO: 50.8 FL (ref 37–54)
EGFRCR SERPLBLD CKD-EPI 2021: 39.2 ML/MIN/1.73
ELLIPTOCYTES BLD QL SMEAR: ABNORMAL
ERYTHROCYTE [DISTWIDTH] IN BLOOD BY AUTOMATED COUNT: 17.8 % (ref 12.3–15.4)
GLOBULIN UR ELPH-MCNC: 2.4 GM/DL
GLUCOSE SERPL-MCNC: 129 MG/DL (ref 65–99)
GLUCOSE UR STRIP-MCNC: NEGATIVE MG/DL
HCT VFR BLD AUTO: 38.7 % (ref 37.5–51)
HGB BLD-MCNC: 12.1 G/DL (ref 13–17.7)
HGB UR QL STRIP.AUTO: NEGATIVE
INR PPP: 1.06 (ref 0.93–1.1)
KETONES UR QL STRIP: NEGATIVE
LEUKOCYTE ESTERASE UR QL STRIP.AUTO: NEGATIVE
LYMPHOCYTES # BLD MANUAL: 0.15 10*3/MM3 (ref 0.7–3.1)
LYMPHOCYTES NFR BLD MANUAL: 1 % (ref 5–12)
MAGNESIUM SERPL-MCNC: 1.8 MG/DL (ref 1.6–2.4)
MCH RBC QN AUTO: 24.9 PG (ref 26.6–33)
MCHC RBC AUTO-ENTMCNC: 31.2 G/DL (ref 31.5–35.7)
MCV RBC AUTO: 80 FL (ref 79–97)
MONOCYTES # BLD: 0.15 10*3/MM3 (ref 0.1–0.9)
NEUTROPHILS # BLD AUTO: 15.09 10*3/MM3 (ref 1.7–7)
NEUTROPHILS NFR BLD MANUAL: 92 % (ref 42.7–76)
NEUTS BAND NFR BLD MANUAL: 6 % (ref 0–5)
NITRITE UR QL STRIP: NEGATIVE
PH UR STRIP.AUTO: 6 [PH] (ref 5–8)
PLAT MORPH BLD: NORMAL
PLATELET # BLD AUTO: 461 10*3/MM3 (ref 140–450)
PMV BLD AUTO: 7.4 FL (ref 6–12)
POTASSIUM SERPL-SCNC: 4.4 MMOL/L (ref 3.5–5.2)
PROT SERPL-MCNC: 6.2 G/DL (ref 6–8.5)
PROT UR QL STRIP: ABNORMAL
PROTHROMBIN TIME: 10.9 SECONDS (ref 9.6–11.7)
RBC # BLD AUTO: 4.84 10*6/MM3 (ref 4.14–5.8)
SARS-COV-2 RNA PNL SPEC NAA+PROBE: DETECTED
SCAN SLIDE: NORMAL
SODIUM SERPL-SCNC: 139 MMOL/L (ref 136–145)
SP GR UR STRIP: 1.04 (ref 1–1.03)
TROPONIN T SERPL-MCNC: <0.01 NG/ML (ref 0–0.03)
TSH SERPL DL<=0.05 MIU/L-ACNC: 4.22 UIU/ML (ref 0.27–4.2)
UROBILINOGEN UR QL STRIP: ABNORMAL
VARIANT LYMPHS NFR BLD MANUAL: 1 % (ref 19.6–45.3)
WBC MORPH BLD: NORMAL
WBC NRBC COR # BLD: 15.4 10*3/MM3 (ref 3.4–10.8)

## 2022-06-02 PROCEDURE — 84484 ASSAY OF TROPONIN QUANT: CPT | Performed by: EMERGENCY MEDICINE

## 2022-06-02 PROCEDURE — 87635 SARS-COV-2 COVID-19 AMP PRB: CPT | Performed by: EMERGENCY MEDICINE

## 2022-06-02 PROCEDURE — 81003 URINALYSIS AUTO W/O SCOPE: CPT | Performed by: EMERGENCY MEDICINE

## 2022-06-02 PROCEDURE — 25010000002 VANCOMYCIN 10 G RECONSTITUTED SOLUTION: Performed by: INTERNAL MEDICINE

## 2022-06-02 PROCEDURE — 82550 ASSAY OF CK (CPK): CPT | Performed by: EMERGENCY MEDICINE

## 2022-06-02 PROCEDURE — 93005 ELECTROCARDIOGRAM TRACING: CPT | Performed by: EMERGENCY MEDICINE

## 2022-06-02 PROCEDURE — 82533 TOTAL CORTISOL: CPT | Performed by: EMERGENCY MEDICINE

## 2022-06-02 PROCEDURE — 87040 BLOOD CULTURE FOR BACTERIA: CPT | Performed by: EMERGENCY MEDICINE

## 2022-06-02 PROCEDURE — 85610 PROTHROMBIN TIME: CPT | Performed by: EMERGENCY MEDICINE

## 2022-06-02 PROCEDURE — 93005 ELECTROCARDIOGRAM TRACING: CPT

## 2022-06-02 PROCEDURE — 85730 THROMBOPLASTIN TIME PARTIAL: CPT | Performed by: INTERNAL MEDICINE

## 2022-06-02 PROCEDURE — 99284 EMERGENCY DEPT VISIT MOD MDM: CPT

## 2022-06-02 PROCEDURE — 83735 ASSAY OF MAGNESIUM: CPT | Performed by: EMERGENCY MEDICINE

## 2022-06-02 PROCEDURE — 25010000002 HEPARIN (PORCINE) 25000-0.45 UT/250ML-% SOLUTION: Performed by: EMERGENCY MEDICINE

## 2022-06-02 PROCEDURE — 85730 THROMBOPLASTIN TIME PARTIAL: CPT | Performed by: EMERGENCY MEDICINE

## 2022-06-02 PROCEDURE — 25010000002 CEFEPIME PER 500 MG: Performed by: EMERGENCY MEDICINE

## 2022-06-02 PROCEDURE — 74177 CT ABD & PELVIS W/CONTRAST: CPT

## 2022-06-02 PROCEDURE — 80050 GENERAL HEALTH PANEL: CPT | Performed by: EMERGENCY MEDICINE

## 2022-06-02 PROCEDURE — 0 IOPAMIDOL PER 1 ML: Performed by: EMERGENCY MEDICINE

## 2022-06-02 PROCEDURE — 83605 ASSAY OF LACTIC ACID: CPT

## 2022-06-02 PROCEDURE — 36415 COLL VENOUS BLD VENIPUNCTURE: CPT | Performed by: EMERGENCY MEDICINE

## 2022-06-02 PROCEDURE — 71275 CT ANGIOGRAPHY CHEST: CPT

## 2022-06-02 PROCEDURE — 71045 X-RAY EXAM CHEST 1 VIEW: CPT

## 2022-06-02 PROCEDURE — 85379 FIBRIN DEGRADATION QUANT: CPT | Performed by: EMERGENCY MEDICINE

## 2022-06-02 PROCEDURE — 85007 BL SMEAR W/DIFF WBC COUNT: CPT | Performed by: EMERGENCY MEDICINE

## 2022-06-02 RX ORDER — SODIUM CHLORIDE 0.9 % (FLUSH) 0.9 %
10 SYRINGE (ML) INJECTION AS NEEDED
Status: DISCONTINUED | OUTPATIENT
Start: 2022-06-02 | End: 2022-06-05 | Stop reason: HOSPADM

## 2022-06-02 RX ORDER — FINASTERIDE 5 MG/1
5 TABLET, FILM COATED ORAL NIGHTLY
Status: DISCONTINUED | OUTPATIENT
Start: 2022-06-02 | End: 2022-06-05 | Stop reason: HOSPADM

## 2022-06-02 RX ORDER — FAMOTIDINE 20 MG/1
40 TABLET, FILM COATED ORAL DAILY
Status: DISCONTINUED | OUTPATIENT
Start: 2022-06-03 | End: 2022-06-05 | Stop reason: HOSPADM

## 2022-06-02 RX ORDER — ACETAMINOPHEN 325 MG/1
650 TABLET ORAL EVERY 4 HOURS PRN
Status: DISCONTINUED | OUTPATIENT
Start: 2022-06-02 | End: 2022-06-05 | Stop reason: HOSPADM

## 2022-06-02 RX ORDER — NITROGLYCERIN 0.4 MG/1
0.4 TABLET SUBLINGUAL
Status: DISCONTINUED | OUTPATIENT
Start: 2022-06-02 | End: 2022-06-05 | Stop reason: HOSPADM

## 2022-06-02 RX ORDER — HEPARIN SODIUM 10000 [USP'U]/100ML
18 INJECTION, SOLUTION INTRAVENOUS
Status: DISCONTINUED | OUTPATIENT
Start: 2022-06-02 | End: 2022-06-05

## 2022-06-02 RX ORDER — POTASSIUM CHLORIDE 1.5 G/1.77G
40 POWDER, FOR SOLUTION ORAL AS NEEDED
Status: DISCONTINUED | OUTPATIENT
Start: 2022-06-02 | End: 2022-06-05 | Stop reason: HOSPADM

## 2022-06-02 RX ORDER — ALBUTEROL SULFATE 90 UG/1
2 AEROSOL, METERED RESPIRATORY (INHALATION)
Status: DISCONTINUED | OUTPATIENT
Start: 2022-06-02 | End: 2022-06-05 | Stop reason: HOSPADM

## 2022-06-02 RX ORDER — SODIUM CHLORIDE 0.9 % (FLUSH) 0.9 %
3 SYRINGE (ML) INJECTION EVERY 12 HOURS SCHEDULED
Status: DISCONTINUED | OUTPATIENT
Start: 2022-06-02 | End: 2022-06-05 | Stop reason: HOSPADM

## 2022-06-02 RX ORDER — POTASSIUM CHLORIDE 20 MEQ/1
40 TABLET, EXTENDED RELEASE ORAL AS NEEDED
Status: DISCONTINUED | OUTPATIENT
Start: 2022-06-02 | End: 2022-06-05 | Stop reason: HOSPADM

## 2022-06-02 RX ORDER — SODIUM CHLORIDE 0.9 % (FLUSH) 0.9 %
3-10 SYRINGE (ML) INJECTION AS NEEDED
Status: DISCONTINUED | OUTPATIENT
Start: 2022-06-02 | End: 2022-06-05 | Stop reason: HOSPADM

## 2022-06-02 RX ORDER — ONDANSETRON 2 MG/ML
4 INJECTION INTRAMUSCULAR; INTRAVENOUS EVERY 6 HOURS PRN
Status: DISCONTINUED | OUTPATIENT
Start: 2022-06-02 | End: 2022-06-05 | Stop reason: HOSPADM

## 2022-06-02 RX ADMIN — IOPAMIDOL 100 ML: 755 INJECTION, SOLUTION INTRAVENOUS at 17:37

## 2022-06-02 RX ADMIN — VANCOMYCIN HYDROCHLORIDE 1500 MG: 10 INJECTION, POWDER, LYOPHILIZED, FOR SOLUTION INTRAVENOUS at 23:58

## 2022-06-02 RX ADMIN — SODIUM CHLORIDE, POTASSIUM CHLORIDE, SODIUM LACTATE AND CALCIUM CHLORIDE 1000 ML: 600; 310; 30; 20 INJECTION, SOLUTION INTRAVENOUS at 19:03

## 2022-06-02 RX ADMIN — CEFEPIME HYDROCHLORIDE 2 G: 2 INJECTION, POWDER, FOR SOLUTION INTRAVENOUS at 17:03

## 2022-06-02 RX ADMIN — HEPARIN SODIUM 18 UNITS/KG/HR: 10000 INJECTION, SOLUTION INTRAVENOUS at 19:06

## 2022-06-02 RX ADMIN — ACETAMINOPHEN 650 MG: 325 TABLET ORAL at 23:54

## 2022-06-02 RX ADMIN — SODIUM CHLORIDE, POTASSIUM CHLORIDE, SODIUM LACTATE AND CALCIUM CHLORIDE 2232 ML: 600; 310; 30; 20 INJECTION, SOLUTION INTRAVENOUS at 15:52

## 2022-06-02 NOTE — ED PROVIDER NOTES
Subjective   Chief complaint weakness lightheaded for like and 1 of passout low blood pressure    History of present illness 71-year-old male 1 day history of weakness lightheadedness feeling like he might passout low blood pressure he did fall tonight and twisted his knee.  He denies any head injury.  No fever chills no black or bloody stool no chest pain neck arm jaw pain no abdominal pain no vomiting or diarrhea no one at home with similar illness.  No foreign travels antibiotic use.  No recent long car ride plane ride immobilization symptoms ongoing all day today General location no pain continuous he has been at home worse when he stands up better when he rest with the above associated symptoms.  Pain continues today severe.  No speech difficulty no paralysis no facial asymmetry and no visual disturbance.  Patient did have a diarrheal illness about 2 weeks ago but it resolved          Review of Systems   Constitutional: Negative for chills and fever.   HENT: Negative for congestion and sinus pressure.    Eyes: Negative for photophobia and visual disturbance.   Respiratory: Negative for chest tightness and shortness of breath.    Cardiovascular: Negative for chest pain and palpitations.   Gastrointestinal: Negative for abdominal pain, blood in stool and vomiting.   Endocrine: Negative for cold intolerance and heat intolerance.   Genitourinary: Negative for difficulty urinating and dysuria.   Musculoskeletal: Negative for back pain and neck pain.   Skin: Negative for color change, rash and wound.   Neurological: Positive for dizziness, weakness and light-headedness. Negative for facial asymmetry, speech difficulty and headaches.   Psychiatric/Behavioral: Negative for agitation, behavioral problems and confusion.       Past Medical History:   Diagnosis Date   • Hyperlipidemia    • Hypertension      Lymphoma  No Known Allergies    Past Surgical History:   Procedure Laterality Date   • APPENDECTOMY     • US GUIDED  LYMPH NODE BIOPSY  9/25/2020   • VASECTOMY Bilateral        No family history on file.    Social History     Socioeconomic History   • Marital status:    Tobacco Use   • Smoking status: Never Smoker   Substance and Sexual Activity   • Alcohol use: Yes   • Drug use: Never   • Sexual activity: Defer     Prior to Admission medications    Medication Sig Start Date End Date Taking? Authorizing Provider   acetaminophen (TYLENOL) 325 MG tablet Take 2 tablets by mouth Every 4 (Four) Hours As Needed for Mild Pain . 2/16/22   Rebeka Morales MD   albuterol sulfate  (90 Base) MCG/ACT inhaler Inhale 2 puffs 4 (Four) Times a Day. 2/16/22   Rebeka Morales MD   budesonide-formoterol (Symbicort) 80-4.5 MCG/ACT inhaler Inhale 2 puffs 2 (Two) Times a Day. 2/16/22   Rebeka Morales MD   dexamethasone (DECADRON) 2 MG tablet 4 mg po q day x 2, 2 mg po q day x 4 then stop 2/16/22   Rebeka Morales MD   fenofibrate (TRICOR) 145 MG tablet Take 145 mg by mouth Daily. 9/10/20   Rocael Robbins MD   finasteride (PROSCAR) 5 MG tablet Take 5 mg by mouth Every Night.    Rocael Robbins MD   hydrALAZINE (APRESOLINE) 25 MG tablet Take 25 mg by mouth 2 (Two) Times a Day. 11/17/21   Rocael Robbins MD     Patient does receive chemotherapy      Objective   Physical Exam  Constitutional 71-year-old male awake alert no acute distress but blood pressure was 80 systolic.  Heart rate 113.  Patient's map is 65.  HEENT extraocular muscles are intact pupils equal round react there is no photophobia mouth clear.  Neck supple no adenopathy no JVD no bruits lungs clear no retraction no use of accessories heart regular without murmur tachycardic abdomen soft without tenderness good bowel sounds no pulsatile masses.  Extremities pulses are equal throughout upper and lower extremities no edema cords or Homans' sign or evidence of DVT.  Skin warm dry without rashes or cellulitic changes.  Neurologic awake alert orientated x4 no facial asymmetry  normal speech no drift the arms or legs normal finger-to-nose there is no focal deficits.  Procedures           ED Course      Results for orders placed or performed during the hospital encounter of 06/02/22   COVID-19,CEPHEID/LUCIA,COR/SANDIE/PAD/LYNNETTE IN-HOUSE(OR EMERGENT/ADD-ON),NP SWAB IN TRANSPORT MEDIA 3-4 HR TAT, RT-PCR - Swab, Nasopharynx    Specimen: Nasopharynx; Swab   Result Value Ref Range    COVID19 Detected (C) Not Detected - Ref. Range   Comprehensive Metabolic Panel    Specimen: Blood   Result Value Ref Range    Glucose 129 (H) 65 - 99 mg/dL    BUN 26 (H) 8 - 23 mg/dL    Creatinine 1.84 (H) 0.76 - 1.27 mg/dL    Sodium 139 136 - 145 mmol/L    Potassium 4.4 3.5 - 5.2 mmol/L    Chloride 104 98 - 107 mmol/L    CO2 19.0 (L) 22.0 - 29.0 mmol/L    Calcium 9.3 8.6 - 10.5 mg/dL    Total Protein 6.2 6.0 - 8.5 g/dL    Albumin 3.80 3.50 - 5.20 g/dL    ALT (SGPT) 12 1 - 41 U/L    AST (SGOT) 18 1 - 40 U/L    Alkaline Phosphatase 48 39 - 117 U/L    Total Bilirubin 0.3 0.0 - 1.2 mg/dL    Globulin 2.4 gm/dL    A/G Ratio 1.6 g/dL    BUN/Creatinine Ratio 14.1 7.0 - 25.0    Anion Gap 16.0 (H) 5.0 - 15.0 mmol/L    eGFR 39.2 (L) >60.0 mL/min/1.73   Protime-INR    Specimen: Blood   Result Value Ref Range    Protime 10.9 9.6 - 11.7 Seconds    INR 1.06 0.93 - 1.10   aPTT    Specimen: Blood   Result Value Ref Range    PTT 65.0 61.0 - 76.5 seconds   Urinalysis With Microscopic If Indicated (No Culture) - Urine, Clean Catch    Specimen: Urine, Clean Catch   Result Value Ref Range    Color, UA Dark Yellow (A) Yellow, Straw    Appearance, UA Clear Clear    pH, UA 6.0 5.0 - 8.0    Specific Gravity, UA 1.045 (H) 1.005 - 1.030    Glucose, UA Negative Negative    Ketones, UA Negative Negative    Bilirubin, UA Negative Negative    Blood, UA Negative Negative    Protein, UA Trace (A) Negative    Leuk Esterase, UA Negative Negative    Nitrite, UA Negative Negative    Urobilinogen, UA 0.2 E.U./dL 0.2 - 1.0 E.U./dL   Troponin    Specimen: Blood    Result Value Ref Range    Troponin T <0.010 0.000 - 0.030 ng/mL   D-dimer, Quantitative    Specimen: Blood   Result Value Ref Range    D-Dimer, Quantitative >35.20 (H) 0.00 - 0.59 mg/L (FEU)   TSH    Specimen: Blood   Result Value Ref Range    TSH 4.220 (H) 0.270 - 4.200 uIU/mL   Magnesium    Specimen: Blood   Result Value Ref Range    Magnesium 1.8 1.6 - 2.4 mg/dL   CK    Specimen: Blood   Result Value Ref Range    Creatine Kinase 59 20 - 200 U/L   CBC Auto Differential    Specimen: Blood   Result Value Ref Range    WBC 15.40 (H) 3.40 - 10.80 10*3/mm3    RBC 4.84 4.14 - 5.80 10*6/mm3    Hemoglobin 12.1 (L) 13.0 - 17.7 g/dL    Hematocrit 38.7 37.5 - 51.0 %    MCV 80.0 79.0 - 97.0 fL    MCH 24.9 (L) 26.6 - 33.0 pg    MCHC 31.2 (L) 31.5 - 35.7 g/dL    RDW 17.8 (H) 12.3 - 15.4 %    RDW-SD 50.8 37.0 - 54.0 fl    MPV 7.4 6.0 - 12.0 fL    Platelets 461 (H) 140 - 450 10*3/mm3   Cortisol    Specimen: Blood   Result Value Ref Range    Cortisol 47.52   mcg/dL   Scan Slide    Specimen: Blood   Result Value Ref Range    Scan Slide     Manual Differential    Specimen: Blood   Result Value Ref Range    Neutrophil % 92.0 (H) 42.7 - 76.0 %    Lymphocyte % 1.0 (L) 19.6 - 45.3 %    Monocyte % 1.0 (L) 5.0 - 12.0 %    Bands %  6.0 (H) 0.0 - 5.0 %    Neutrophils Absolute 15.09 (H) 1.70 - 7.00 10*3/mm3    Lymphocytes Absolute 0.15 (L) 0.70 - 3.10 10*3/mm3    Monocytes Absolute 0.15 0.10 - 0.90 10*3/mm3    Elliptocytes Slight/1+ None Seen    WBC Morphology Normal Normal    Platelet Morphology Normal Normal   POC Lactate    Specimen: Blood   Result Value Ref Range    Lactate 0.8 0.5 - 2.0 mmol/L   ECG 12 Lead   Result Value Ref Range    QT Interval 329 ms     CT Abdomen Pelvis With Contrast    Result Date: 6/2/2022  1.There is abnormal appearance of the majority of the small bowel and the cecum and ascending colon demonstrating low attenuation wall thickening likely representing submucosal edema, mucosal enhancement and mesenteric  hyperemia with reactive stranding and fluid around the cecum. This appearance could be due to enteritis/colitis. This could be due to inflammatory bowel disease. This could be related to shock bowel in the setting of hypovolemic shock. Arterial flow and venous outflow from the bowel appears intact. 2.Small volume ascites, likely reactive.  Electronically Signed By-Sherif Giraldo MD On:6/2/2022 6:03 PM This report was finalized on [id number] by  Sherif Giraldo MD.    XR Chest 1 View    Result Date: 6/2/2022  1.An acute pulmonary process is not apparent.  Electronically Signed By-Duncan Atkins MD On:6/2/2022 4:27 PM This report was finalized on [id number] by  Duncan Atkins MD.    CT Angiogram Chest Pulmonary Embolism    Result Date: 6/2/2022  1. Pulmonary embolus within right lower lobe segmental branch pulmonary artery. No findings to indicate right heart strain. 2. Cardiomegaly. 3. No intrathoracic adenopathy. 4. Please see separately dictated abdominal CT for findings below the diaphragm.  Electronically Signed By-Orlando Mckee MD On:6/2/2022 5:56 PM This report was finalized on [id number] by  Orlando Mckee MD.    Medications   sodium chloride 0.9 % flush 10 mL (has no administration in time range)   heparin 34386 units/250 mL (100 units/mL) in 0.45 % NaCl infusion (18 Units/kg/hr × 74.4 kg Intravenous New Bag 6/2/22 1906)   heparin bolus from bag 3,000 Units (has no administration in time range)   heparin bolus from bag 6,000 Units (has no administration in time range)   lactated ringers bolus 2,232 mL (0 mL/kg × 74.4 kg Intravenous Stopped 6/2/22 1652)   cefepime 2 gm IVPB in 100 ml NS (MBP) (0 g Intravenous Stopped 6/2/22 1733)   iopamidol (ISOVUE-370) 76 % injection 100 mL (100 mL Intravenous Given 6/2/22 1737)   lactated ringers bolus 1,000 mL (1,000 mL Intravenous New Bag 6/2/22 1903)   heparin bolus from bag 6,000 Units (6,000 Units Intravenous Bolus from Bag 6/2/22 1915)          EKG interpretation  normal sinus rhythm rate of 110 no acute ST elevation nonspecific T wave flattening laterally aVL QTC of 444 is an abnormal EKG but really no significant change from previous other than faster rate                            SEPTIC SHOCK FOCUSED EXAM ATTESTATION    I attest that I have reassessed tissue perfusion after the fluid bolus given.    Rolando Yang MD  06/02/22  23:02 EDT             MDM  Number of Diagnoses or Management Options  Colitis: new and requires workup  COVID-19: new and requires workup  Enteritis: new and requires workup  Hypotension, unspecified hypotension type: new and requires workup  Single subsegmental pulmonary embolism without acute cor pulmonale (HCC): new and requires workup  Weakness: new and requires workup  Diagnosis management comments: Medical decision making.  Patient IV established placed on the monitor and had the above exam evaluation.  Sepsis protocol was started IV lactated Ringer's 30 mill per kilo and had the above evaluation EKG obtained revealed a sinus rhythm with a heart rate about 110 no other acute ST elevation.  No significant changes from previous when compared patient had positive COVID-19 test he was also positive back in February.  Blood sugar was 129 patient had a BUN 26 creatinine 1.8 urine negative troponin negative dimer elevated over 35 TSH normal magnesium 1.8 CK 59 lactate was 0.8.  Cultures are pending patient had been placed on cefepime 2 g IV.  He underwent CT scans CT of the chest showed a pulmonary embolism in the right lower lobe segmental branch no findings of right heart strain cardiomegaly was noted no other acute abnormalities.  CT abdomen pelvis there is an abnormal appearance of majority of the small bowel and the cecum and ascending colon demonstrating low-attenuation wall thickening likely representing submucosal edema mucosal enhancement and mesenteric hyperemia with reactive stranding and fluid around the cecal this appearance could be  due to enteritis or colitis could be inflammatory bowel disease it could be related to shock bowel in the setting of hypovolemic shock.  Arterial and venous outflow and arterial inflow appear to be intact small volume ascites.  The patient had been given additional liter lactated Ringer's.  He was started on weightbase heparin sats are good in the 94 to 93% range on room air respiratory rate of 18.  Lactate was normal the patient has been given fluids blood pressure is now 100/65 after some IV fluids.  He is awake and alert mentating well.  His lungs are clear his heart was regular without murmur and was soft nontender good bowel sounds.  He moves air without difficulties no rash no skin changes or cellulitic changes.  He is improved cultures are pending.  He was made aware of the findings I talked to Dr. Valdovinos.  He will be admitted to the PCU for further care stable unremarkable improved ER course.  All labs EKG CTs reviewed by me.  CT is reviewed by radiology as well       Amount and/or Complexity of Data Reviewed  Clinical lab tests: reviewed  Tests in the radiology section of CPT®: reviewed  Discuss the patient with other providers: yes    Risk of Complications, Morbidity, and/or Mortality  Presenting problems: high  Diagnostic procedures: high  Management options: high    Patient Progress  Patient progress: stable      Final diagnoses:   Weakness   Hypotension, unspecified hypotension type   Single subsegmental pulmonary embolism without acute cor pulmonale (HCC)   Enteritis   Colitis   COVID-19       ED Disposition  ED Disposition     ED Disposition   Decision to Admit    Condition   --    Comment   Level of Care: Med/Surg [1]   Diagnosis: Pulmonary embolism (HCC) [347799]   Admitting Physician: MARYAM VALDOVINOS [[id number]]   Attending Physician: MARYAM VALDOVINOS [[id number]]   Certification: I certify that inpatient hospital services are medically necessary for greater than 2 midnights.               No follow-up provider  specified.       Medication List      No changes were made to your prescriptions during this visit.          Rolando Yang MD  06/02/22 0183

## 2022-06-03 ENCOUNTER — APPOINTMENT (OUTPATIENT)
Dept: GENERAL RADIOLOGY | Facility: HOSPITAL | Age: 72
End: 2022-06-03

## 2022-06-03 ENCOUNTER — APPOINTMENT (OUTPATIENT)
Dept: CARDIOLOGY | Facility: HOSPITAL | Age: 72
End: 2022-06-03

## 2022-06-03 LAB
ANION GAP SERPL CALCULATED.3IONS-SCNC: 11 MMOL/L (ref 5–15)
APTT PPP: 105.1 SECONDS (ref 61–76.5)
APTT PPP: 57.8 SECONDS (ref 61–76.5)
APTT PPP: >139 SECONDS (ref 61–76.5)
APTT PPP: >200 SECONDS (ref 22.7–35.4)
BH CV LOWER VASCULAR LEFT COMMON FEMORAL AUGMENT: NORMAL
BH CV LOWER VASCULAR LEFT COMMON FEMORAL COMPETENT: NORMAL
BH CV LOWER VASCULAR LEFT COMMON FEMORAL COMPRESS: NORMAL
BH CV LOWER VASCULAR LEFT COMMON FEMORAL PHASIC: NORMAL
BH CV LOWER VASCULAR LEFT COMMON FEMORAL SPONT: NORMAL
BH CV LOWER VASCULAR LEFT DISTAL FEMORAL COMPRESS: NORMAL
BH CV LOWER VASCULAR LEFT GASTRONEMIUS COMPRESS: NORMAL
BH CV LOWER VASCULAR LEFT GREATER SAPH AK COMPRESS: NORMAL
BH CV LOWER VASCULAR LEFT GREATER SAPH BK COMPRESS: NORMAL
BH CV LOWER VASCULAR LEFT LESSER SAPH COMPRESS: NORMAL
BH CV LOWER VASCULAR LEFT MID FEMORAL AUGMENT: NORMAL
BH CV LOWER VASCULAR LEFT MID FEMORAL COMPETENT: NORMAL
BH CV LOWER VASCULAR LEFT MID FEMORAL COMPRESS: NORMAL
BH CV LOWER VASCULAR LEFT MID FEMORAL PHASIC: NORMAL
BH CV LOWER VASCULAR LEFT MID FEMORAL SPONT: NORMAL
BH CV LOWER VASCULAR LEFT PERONEAL COMPRESS: NORMAL
BH CV LOWER VASCULAR LEFT POPLITEAL AUGMENT: NORMAL
BH CV LOWER VASCULAR LEFT POPLITEAL COMPETENT: NORMAL
BH CV LOWER VASCULAR LEFT POPLITEAL COMPRESS: NORMAL
BH CV LOWER VASCULAR LEFT POPLITEAL PHASIC: NORMAL
BH CV LOWER VASCULAR LEFT POPLITEAL SPONT: NORMAL
BH CV LOWER VASCULAR LEFT POSTERIOR TIBIAL COMPRESS: NORMAL
BH CV LOWER VASCULAR LEFT PROXIMAL FEMORAL COMPRESS: NORMAL
BH CV LOWER VASCULAR LEFT SAPHENOFEMORAL JUNCTION COMPRESS: NORMAL
BH CV LOWER VASCULAR RIGHT COMMON FEMORAL AUGMENT: NORMAL
BH CV LOWER VASCULAR RIGHT COMMON FEMORAL COMPETENT: NORMAL
BH CV LOWER VASCULAR RIGHT COMMON FEMORAL COMPRESS: NORMAL
BH CV LOWER VASCULAR RIGHT COMMON FEMORAL PHASIC: NORMAL
BH CV LOWER VASCULAR RIGHT COMMON FEMORAL SPONT: NORMAL
BH CV LOWER VASCULAR RIGHT DISTAL FEMORAL COMPRESS: NORMAL
BH CV LOWER VASCULAR RIGHT GASTRONEMIUS COMPRESS: NORMAL
BH CV LOWER VASCULAR RIGHT GREATER SAPH AK COMPRESS: NORMAL
BH CV LOWER VASCULAR RIGHT GREATER SAPH BK COMPRESS: NORMAL
BH CV LOWER VASCULAR RIGHT LESSER SAPH COMPRESS: NORMAL
BH CV LOWER VASCULAR RIGHT MID FEMORAL AUGMENT: NORMAL
BH CV LOWER VASCULAR RIGHT MID FEMORAL COMPETENT: NORMAL
BH CV LOWER VASCULAR RIGHT MID FEMORAL COMPRESS: NORMAL
BH CV LOWER VASCULAR RIGHT MID FEMORAL PHASIC: NORMAL
BH CV LOWER VASCULAR RIGHT MID FEMORAL SPONT: NORMAL
BH CV LOWER VASCULAR RIGHT PERONEAL COMPRESS: NORMAL
BH CV LOWER VASCULAR RIGHT POPLITEAL AUGMENT: NORMAL
BH CV LOWER VASCULAR RIGHT POPLITEAL COMPETENT: NORMAL
BH CV LOWER VASCULAR RIGHT POPLITEAL COMPRESS: NORMAL
BH CV LOWER VASCULAR RIGHT POPLITEAL PHASIC: NORMAL
BH CV LOWER VASCULAR RIGHT POPLITEAL SPONT: NORMAL
BH CV LOWER VASCULAR RIGHT POSTERIOR TIBIAL COMPRESS: NORMAL
BH CV LOWER VASCULAR RIGHT PROXIMAL FEMORAL COMPRESS: NORMAL
BH CV LOWER VASCULAR RIGHT SAPHENOFEMORAL JUNCTION COMPRESS: NORMAL
BLASTS NFR BLD MANUAL: 1 % (ref 0–0)
BUN SERPL-MCNC: 29 MG/DL (ref 8–23)
BUN/CREAT SERPL: 17.7 (ref 7–25)
CALCIUM SPEC-SCNC: 8.4 MG/DL (ref 8.6–10.5)
CHLORIDE SERPL-SCNC: 108 MMOL/L (ref 98–107)
CO2 SERPL-SCNC: 21 MMOL/L (ref 22–29)
CREAT SERPL-MCNC: 1.64 MG/DL (ref 0.76–1.27)
DEPRECATED RDW RBC AUTO: 49.4 FL (ref 37–54)
EGFRCR SERPLBLD CKD-EPI 2021: 45 ML/MIN/1.73
ELLIPTOCYTES BLD QL SMEAR: ABNORMAL
ERYTHROCYTE [DISTWIDTH] IN BLOOD BY AUTOMATED COUNT: 17.7 % (ref 12.3–15.4)
FERRITIN SERPL-MCNC: 283.6 NG/ML (ref 30–400)
GLUCOSE SERPL-MCNC: 105 MG/DL (ref 65–99)
HCT VFR BLD AUTO: 28 % (ref 37.5–51)
HGB BLD-MCNC: 9 G/DL (ref 13–17.7)
IRON 24H UR-MRATE: 42 MCG/DL (ref 59–158)
IRON SATN MFR SERPL: 11 % (ref 20–50)
LAB AP CASE REPORT: NORMAL
LARGE PLATELETS: ABNORMAL
LYMPHOCYTES # BLD MANUAL: 0.27 10*3/MM3 (ref 0.7–3.1)
LYMPHOCYTES NFR BLD MANUAL: 5 % (ref 5–12)
MAXIMAL PREDICTED HEART RATE: 151 BPM
MCH RBC QN AUTO: 25.5 PG (ref 26.6–33)
MCHC RBC AUTO-ENTMCNC: 32.2 G/DL (ref 31.5–35.7)
MCV RBC AUTO: 79.3 FL (ref 79–97)
MONOCYTES # BLD: 0.45 10*3/MM3 (ref 0.1–0.9)
MRSA DNA SPEC QL NAA+PROBE: NORMAL
NEUTROPHILS # BLD AUTO: 8.19 10*3/MM3 (ref 1.7–7)
NEUTROPHILS NFR BLD MANUAL: 85 % (ref 42.7–76)
NEUTS BAND NFR BLD MANUAL: 6 % (ref 0–5)
PATH REPORT.FINAL DX SPEC: NORMAL
PATHOLOGY REVIEW: YES
PLATELET # BLD AUTO: 335 10*3/MM3 (ref 140–450)
PMV BLD AUTO: 7.4 FL (ref 6–12)
POTASSIUM SERPL-SCNC: 4.1 MMOL/L (ref 3.5–5.2)
RBC # BLD AUTO: 3.53 10*6/MM3 (ref 4.14–5.8)
SCAN SLIDE: NORMAL
SODIUM SERPL-SCNC: 140 MMOL/L (ref 136–145)
STRESS TARGET HR: 128 BPM
TIBC SERPL-MCNC: 367 MCG/DL (ref 298–536)
TRANSFERRIN SERPL-MCNC: 246 MG/DL (ref 200–360)
VARIANT LYMPHS NFR BLD MANUAL: 3 % (ref 19.6–45.3)
WBC MORPH BLD: NORMAL
WBC NRBC COR # BLD: 9 10*3/MM3 (ref 3.4–10.8)

## 2022-06-03 PROCEDURE — 94799 UNLISTED PULMONARY SVC/PX: CPT

## 2022-06-03 PROCEDURE — 85730 THROMBOPLASTIN TIME PARTIAL: CPT | Performed by: INTERNAL MEDICINE

## 2022-06-03 PROCEDURE — 25010000002 HEPARIN (PORCINE) 25000-0.45 UT/250ML-% SOLUTION: Performed by: INTERNAL MEDICINE

## 2022-06-03 PROCEDURE — 87641 MR-STAPH DNA AMP PROBE: CPT | Performed by: INTERNAL MEDICINE

## 2022-06-03 PROCEDURE — 94640 AIRWAY INHALATION TREATMENT: CPT

## 2022-06-03 PROCEDURE — 82728 ASSAY OF FERRITIN: CPT | Performed by: NURSE PRACTITIONER

## 2022-06-03 PROCEDURE — 85025 COMPLETE CBC W/AUTO DIFF WBC: CPT | Performed by: INTERNAL MEDICINE

## 2022-06-03 PROCEDURE — 93970 EXTREMITY STUDY: CPT

## 2022-06-03 PROCEDURE — 80048 BASIC METABOLIC PNL TOTAL CA: CPT | Performed by: INTERNAL MEDICINE

## 2022-06-03 PROCEDURE — 83540 ASSAY OF IRON: CPT | Performed by: NURSE PRACTITIONER

## 2022-06-03 PROCEDURE — 25010000002 CEFEPIME PER 500 MG: Performed by: INTERNAL MEDICINE

## 2022-06-03 PROCEDURE — 84466 ASSAY OF TRANSFERRIN: CPT | Performed by: NURSE PRACTITIONER

## 2022-06-03 PROCEDURE — 73560 X-RAY EXAM OF KNEE 1 OR 2: CPT

## 2022-06-03 PROCEDURE — 94664 DEMO&/EVAL PT USE INHALER: CPT

## 2022-06-03 PROCEDURE — 85007 BL SMEAR W/DIFF WBC COUNT: CPT | Performed by: INTERNAL MEDICINE

## 2022-06-03 RX ORDER — SODIUM CHLORIDE 9 MG/ML
75 INJECTION, SOLUTION INTRAVENOUS CONTINUOUS
Status: DISCONTINUED | OUTPATIENT
Start: 2022-06-03 | End: 2022-06-05 | Stop reason: HOSPADM

## 2022-06-03 RX ORDER — FERROUS SULFATE TAB EC 324 MG (65 MG FE EQUIVALENT) 324 (65 FE) MG
324 TABLET DELAYED RESPONSE ORAL
Status: DISCONTINUED | OUTPATIENT
Start: 2022-06-03 | End: 2022-06-05 | Stop reason: HOSPADM

## 2022-06-03 RX ADMIN — ALBUTEROL SULFATE 2 PUFF: 108 INHALANT RESPIRATORY (INHALATION) at 12:21

## 2022-06-03 RX ADMIN — ALBUTEROL SULFATE 2 PUFF: 108 INHALANT RESPIRATORY (INHALATION) at 08:17

## 2022-06-03 RX ADMIN — ALBUTEROL SULFATE 2 PUFF: 108 INHALANT RESPIRATORY (INHALATION) at 16:10

## 2022-06-03 RX ADMIN — FERROUS SULFATE TAB EC 324 MG (65 MG FE EQUIVALENT) 324 MG: 324 (65 FE) TABLET DELAYED RESPONSE at 11:46

## 2022-06-03 RX ADMIN — HEPARIN SODIUM 15.1 UNITS/KG/HR: 10000 INJECTION, SOLUTION INTRAVENOUS at 09:23

## 2022-06-03 RX ADMIN — SODIUM CHLORIDE 75 ML/HR: 9 INJECTION, SOLUTION INTRAVENOUS at 11:46

## 2022-06-03 RX ADMIN — FAMOTIDINE 40 MG: 20 TABLET ORAL at 09:17

## 2022-06-03 RX ADMIN — CEFEPIME HYDROCHLORIDE 2 G: 2 INJECTION, POWDER, FOR SOLUTION INTRAVENOUS at 05:20

## 2022-06-03 RX ADMIN — ALBUTEROL SULFATE 2 PUFF: 108 INHALANT RESPIRATORY (INHALATION) at 23:10

## 2022-06-03 RX ADMIN — CEFEPIME HYDROCHLORIDE 2 G: 2 INJECTION, POWDER, FOR SOLUTION INTRAVENOUS at 18:10

## 2022-06-03 RX ADMIN — ACETAMINOPHEN 650 MG: 325 TABLET ORAL at 09:23

## 2022-06-03 RX ADMIN — FINASTERIDE 5 MG: 5 TABLET, FILM COATED ORAL at 20:21

## 2022-06-03 RX ADMIN — Medication 3 ML: at 09:17

## 2022-06-03 RX ADMIN — Medication 3 ML: at 20:21

## 2022-06-03 NOTE — H&P
Patient Care Team:  Mat Aiken MD as PCP - General (Family Medicine)    Chief complaint  Lightheadness    Subjective     Patient is a 71 y.o. male who presents with past medical history follicular non-Hodgkin's lymphoma on maintenance chemo, anemia, chronic kidney disease stage III and recently hospitalized in February 22 with COVID-19 pneumonia and he received remdesivir.  He presented to the emergency department on 6/22/2022 with complaints of light lightheadedness that led to a fall.  In the ED the patient was found to be hypotensive with a pulmonary emboli and COVID-19 positive.  On examination he denies any shortness of breath, chest pain, nausea, vomiting and/or fever or chills.  Patient does complain of right knee pain.  Will be admitted for further evaluation and treatment    Review of Systems   Constitutional: Positive for activity change. Negative for appetite change, chills, diaphoresis and fatigue.   HENT: Negative.  Negative for trouble swallowing.    Respiratory: Negative for chest tightness and shortness of breath.    Cardiovascular: Negative.  Negative for chest pain and leg swelling.   Gastrointestinal: Negative.    Genitourinary: Negative.    Musculoskeletal: Positive for gait problem.   Neurological: Positive for weakness. Negative for dizziness and light-headedness.   Psychiatric/Behavioral: Negative.           History  Past Medical History:   Diagnosis Date   • Hyperlipidemia    • Hypertension      Past Surgical History:   Procedure Laterality Date   • APPENDECTOMY     • US GUIDED LYMPH NODE BIOPSY  9/25/2020   • VASECTOMY Bilateral      No family history on file.  Social History     Tobacco Use   • Smoking status: Never Smoker   Substance Use Topics   • Alcohol use: Yes   • Drug use: Never     Medications Prior to Admission   Medication Sig Dispense Refill Last Dose   • acetaminophen (TYLENOL) 325 MG tablet Take 2 tablets by mouth Every 4 (Four) Hours As Needed for Mild Pain .      •  albuterol sulfate  (90 Base) MCG/ACT inhaler Inhale 2 puffs 4 (Four) Times a Day. 18 g 0    • budesonide-formoterol (Symbicort) 80-4.5 MCG/ACT inhaler Inhale 2 puffs 2 (Two) Times a Day. 1 each 12    • fenofibrate (TRICOR) 145 MG tablet Take 145 mg by mouth Daily.      • finasteride (PROSCAR) 5 MG tablet Take 5 mg by mouth Every Night.      • hydrALAZINE (APRESOLINE) 25 MG tablet Take 25 mg by mouth 2 (Two) Times a Day.        Allergies:  Patient has no known allergies.    Objective     Vital Signs  Temp:  [97 °F (36.1 °C)-98.2 °F (36.8 °C)] 98.2 °F (36.8 °C)  Heart Rate:  [] 85  Resp:  [14-18] 18  BP: ()/(52-65) 109/62       Physical Exam  Vitals reviewed.   Constitutional:       Appearance: He is not ill-appearing.   HENT:      Head: Normocephalic and atraumatic.      Right Ear: External ear normal.      Left Ear: External ear normal.      Nose: Nose normal.      Mouth/Throat:      Mouth: Mucous membranes are moist.   Eyes:      General:         Right eye: No discharge.         Left eye: No discharge.   Cardiovascular:      Rate and Rhythm: Normal rate and regular rhythm.      Pulses: Normal pulses.      Heart sounds: Normal heart sounds.   Pulmonary:      Effort: Pulmonary effort is normal.      Breath sounds: Normal breath sounds.   Abdominal:      General: Bowel sounds are normal.      Palpations: Abdomen is soft.   Musculoskeletal:         General: Tenderness and signs of injury present.      Cervical back: Normal range of motion.   Skin:     General: Skin is warm.      Coloration: Skin is pale.   Neurological:      Mental Status: He is alert and oriented to person, place, and time.   Psychiatric:         Behavior: Behavior normal.          Results Review:     Imaging Results (Last 24 Hours)     Procedure Component Value Units Date/Time    CT Abdomen Pelvis With Contrast [[id number]] Collected: 06/02/22 1754     Updated: 06/02/22 1805    Narrative:      Examination: CT ABDOMEN PELVIS W  CONTRAST-     Date of Exam: 6/2/2022 5:32 PM     Indication: Weakness near syncope low blood pressure.     Comparison: None available.     Technique: Axial volumetric contrast-enhanced CT imaging of the abdomen  and pelvis was performed utilizing 100 mL Isovue-370 IV contrast.  Sagittal and coronal reconstructions were created for interpretation.  Automated exposure control and iterative reconstruction methods were  used.     Findings:  There is dependent bibasilar atelectasis. Distal esophagus is  unremarkable. The heart size is normal. Subcutaneous fat and underlying  musculature appear within normal limits. There are no acute osseous  abnormalities or destructive bone lesions. There are mild multilevel  thoracolumbar degenerative changes.     The liver, gallbladder, bile ducts, pancreas, spleen, adrenal glands,  left kidney and both ureters appear within normal limits. There is a 12  mm simple mid right kidney cyst. Urinary bladder is within normal  limits. The prostate gland is mildly enlarged measuring up to 50 mm. The  urinary bladder is within normal limits. There is a small amount of  perihepatic, perisplenic and right pelvic ascites. There is diffuse low  attenuation wall thickening throughout the small bowel with mucosal  hyperenhancement and mesenteric hyperemia. These changes appear most  pronounced at the terminal ileum and are also present at the cecum. The  remainder of the colon is relatively empty. There is no lymphadenopathy  or pneumoperitoneum.       Impression:      1.There is abnormal appearance of the majority of the small bowel and  the cecum and ascending colon demonstrating low attenuation wall  thickening likely representing submucosal edema, mucosal enhancement and  mesenteric hyperemia with reactive stranding and fluid around the cecum.  This appearance could be due to enteritis/colitis. This could be due to  inflammatory bowel disease. This could be related to shock bowel in  the  setting of hypovolemic shock. Arterial flow and venous outflow from the  bowel appears intact.  2.Small volume ascites, likely reactive.     Electronically Signed By-Sherif Giraldo MD On:6/2/2022 6:03 PM  This report was finalized on [id number] by  Sherif Giraldo MD.    CT Angiogram Chest Pulmonary Embolism [[id number]] Collected: 06/02/22 1747     Updated: 06/02/22 1758    Narrative:         DATE OF EXAM:  6/2/2022 5:32 PM     PROCEDURE:  CT ANGIOGRAM CHEST PULMONARY EMBOLISM-     INDICATIONS:   Weakness low blood pressure elevated D-dimer history of lymphoma     COMPARISON:   No Comparisons Available     TECHNIQUE:  Routine transaxial slices were obtained through chest after  administration of intravenous Isovue 370. Reconstructed coronal and  sagittal images were also obtained. In addition, a 3 D volume rendered  image was obtained after post processing.  Automated exposure control  and iterative reconstruction methods were used.      FINDINGS:  Soft tissues of the lower neck demonstrate a right-sided port catheter  with the tip terminating at the low SVC. Thoracic aortic branch  vasculature is patent. Small foci of air within the right pectoralis  muscle likely related to recent port placement. Variant arch anatomy  with the left vertebral artery arising directly from the aortic arch.     The heart is enlarged. Pulmonary arteries are well-opacified with  contrast. There is a filling defect within the right lower lobe superior  segment pulmonary artery consistent pulmonary embolus (axial image 67,  sagittal image 86). No findings to indicate right heart strain.      Negative for mediastinal, hilar, or axillary adenopathy. No pleural  effusion. No suspicious pulmonary nodule or mass. The trachea and  mainstem bronchi are patent. Minimal subsegmental bibasilar atelectasis.  No consolidation or findings of pneumonia. No pneumothorax. Osseous  structures appear intact. Chronic healed fracture deformity at  the  distal left clavicle.     Please see separately dictated abdominal CT for findings below the  diaphragm.       Impression:      1. Pulmonary embolus within right lower lobe segmental branch pulmonary  artery. No findings to indicate right heart strain. 2. Cardiomegaly.  3. No intrathoracic adenopathy.  4. Please see separately dictated abdominal CT for findings below the  diaphragm.     Electronically Signed By-Orlando Mckee MD On:6/2/2022 5:56 PM  This report was finalized on [id number] by  Orlando Mckee MD.    XR Chest 1 View [[id number]] Collected: 06/02/22 1626     Updated: 06/02/22 1629    Narrative:      DATE OF EXAM:  6/2/2022 3:45 AM     PROCEDURE:  XR CHEST 1 VW-     INDICATIONS:  Weakness low blood pressure dizzy     COMPARISON:  02/13/2022     TECHNIQUE:   Single radiographic AP view of the chest was obtained.     FINDINGS:  The heart is not enlarged. The lungs seem clear. There are no pleural  effusions. There is an old fracture deformity of the left clavicle.       Impression:      1.An acute pulmonary process is not apparent.     Electronically Signed By-Duncan Atkins MD On:6/2/2022 4:27 PM  This report was finalized on [id number] by  Duncan Atkins MD.           Lab Results (last 24 hours)     Procedure Component Value Units Date/Time    Iron Profile [[id number]] Collected: 06/03/22 0331    Specimen: Blood Updated: 06/03/22 1100    Ferritin [[id number]] Collected: 06/03/22 0331    Specimen: Blood Updated: 06/03/22 1100    Pathology Consultation [[id number]] Collected: 06/03/22 0331    Specimen: Blood, Venous Line Updated: 06/03/22 0858     Final Diagnosis --     Left shifted granulocytes  Anemia  No blasts identified       Case Report --     Surgical Pathology Report                         Case: [id number]                                  Authorizing Provider:  Rebeka Morales MD            Collected:           06/03/2022 03:31 AM          Ordering Location:     [facility]       Received:             06/03/2022 07:32 AM                                 PROGRESS CARE                                                                Pathologist:           Lakhwinder Tavera MD                                                            Specimen:    Blood, Venous Line                                                                         MRSA Screen, PCR (Inpatient) - Swab, Nares [[id number]]  (Normal) Collected: 06/03/22 0333    Specimen: Swab from Nares Updated: 06/03/22 0451     MRSA PCR No MRSA Detected    aPTT [[id number]]  (Abnormal) Collected: 06/03/22 0331    Specimen: Blood Updated: 06/03/22 0412     PTT >139.0 seconds     Manual Differential [[id number]]  (Abnormal) Collected: 06/03/22 0331    Specimen: Blood Updated: 06/03/22 0410     Neutrophil % 85.0 %      Lymphocyte % 3.0 %      Monocyte % 5.0 %      Bands %  6.0 %      Blasts % 1.0 %      Neutrophils Absolute 8.19 10*3/mm3      Lymphocytes Absolute 0.27 10*3/mm3      Monocytes Absolute 0.45 10*3/mm3      Elliptocytes Slight/1+     WBC Morphology Normal     Large Platelets Slight/1+    Path Consult Reflex [[id number]] Collected: 06/03/22 0331    Specimen: Blood Updated: 06/03/22 0410     Pathology Review Yes    CBC & Differential [[id number]]  (Abnormal) Collected: 06/03/22 0331    Specimen: Blood Updated: 06/03/22 0410    Narrative:      The following orders were created for panel order CBC & Differential.  Procedure                               Abnormality         Status                     ---------                               -----------         ------                     CBC Auto Differential[[id number]]        Abnormal            Final result               Scan Slide[[id number]]                                       Final result                 Please view results for these tests on the individual orders.    CBC Auto Differential [[id number]]  (Abnormal) Collected: 06/03/22 0331    Specimen: Blood Updated: 06/03/22 0410     WBC 9.00 10*3/mm3       RBC 3.53 10*6/mm3      Hemoglobin 9.0 g/dL      Comment: Result checked         Hematocrit 28.0 %      Comment: Result checked         MCV 79.3 fL      MCH 25.5 pg      MCHC 32.2 g/dL      RDW 17.7 %      RDW-SD 49.4 fl      MPV 7.4 fL      Platelets 335 10*3/mm3     Narrative:      The previously reported component NRBC is no longer being reported. Previous result was 0.0 /100 WBC (Reference Range: 0.0-0.2 /100 WBC) on 6/3/2022 at 0344 EDT.    Scan Slide [[id number]] Collected: 06/03/22 0331    Specimen: Blood Updated: 06/03/22 0410     Scan Slide --     Comment: See Manual Differential Results       Basic Metabolic Panel [[id number]]  (Abnormal) Collected: 06/03/22 0331    Specimen: Blood Updated: 06/03/22 0358     Glucose 105 mg/dL      BUN 29 mg/dL      Creatinine 1.64 mg/dL      Sodium 140 mmol/L      Potassium 4.1 mmol/L      Chloride 108 mmol/L      CO2 21.0 mmol/L      Calcium 8.4 mg/dL      BUN/Creatinine Ratio 17.7     Anion Gap 11.0 mmol/L      eGFR 45.0 mL/min/1.73      Comment: National Kidney Foundation and American Society of Nephrology (ASN) Task Force recommended calculation based on the Chronic Kidney Disease Epidemiology Collaboration (CKD-EPI) equation refit without adjustment for race.       Narrative:      GFR Normal >60  Chronic Kidney Disease <60  Kidney Failure <15      aPTT [[id number]]  (Abnormal) Collected: 06/02/22 2253    Specimen: Blood Updated: 06/03/22 0000     .1 seconds     Urinalysis With Microscopic If Indicated (No Culture) - Urine, Clean Catch [[id number]]  (Abnormal) Collected: 06/02/22 2008    Specimen: Urine, Clean Catch Updated: 06/02/22 2033     Color, UA Dark Yellow     Appearance, UA Clear     pH, UA 6.0     Specific Gravity, UA 1.045     Glucose, UA Negative     Ketones, UA Negative     Bilirubin, UA Negative     Blood, UA Negative     Protein, UA Trace     Leuk Esterase, UA Negative     Nitrite, UA Negative     Urobilinogen, UA 0.2 E.U./dL    Narrative:      Urine  microscopic not indicated.    COVID PRE-OP / PRE-PROCEDURE SCREENING ORDER (NO ISOLATION) - Swab, Nasopharynx [[id number]]  (Abnormal) Collected: 06/02/22 1712    Specimen: Swab from Nasopharynx Updated: 06/02/22 1738    Narrative:      The following orders were created for panel order COVID PRE-OP / PRE-PROCEDURE SCREENING ORDER (NO ISOLATION) - Swab, Nasopharynx.  Procedure                               Abnormality         Status                     ---------                               -----------         ------                     COVID-19,CEPHEID/LUCIA,CO...[[id number]]  Abnormal            Final result                 Please view results for these tests on the individual orders.    COVID-19,CEPHEID/LUCIA,COR/SANDIE/PAD/LYNNETTE IN-HOUSE(OR EMERGENT/ADD-ON),NP SWAB IN TRANSPORT MEDIA 3-4 HR TAT, RT-PCR - Swab, Nasopharynx [[id number]]  (Abnormal) Collected: 06/02/22 1712    Specimen: Swab from Nasopharynx Updated: 06/02/22 1738     COVID19 Detected    Narrative:      Fact sheet for providers: https://www.fda.gov/media/272308/download     Fact sheet for patients: https://www.fda.gov/media/606017/download  Fact sheet for providers: https://www.fda.gov/media/058683/download    Fact sheet for patients: https://www.fda.gov/media/317616/download    Test performed by PCR.    CBC & Differential [[id number]]  (Abnormal) Collected: 06/02/22 1553    Specimen: Blood Updated: 06/02/22 1709    Narrative:      The following orders were created for panel order CBC & Differential.  Procedure                               Abnormality         Status                     ---------                               -----------         ------                     CBC Auto Differential[[id number]]        Abnormal            Final result               Scan Slide[[id number]]                                       Final result                 Please view results for these tests on the individual orders.    Scan Slide [[id number]] Collected: 06/02/22 1553     Specimen: Blood Updated: 06/02/22 1709     Scan Slide --     Comment: See Manual Differential Results       Manual Differential [[id number]]  (Abnormal) Collected: 06/02/22 1553    Specimen: Blood Updated: 06/02/22 1709     Neutrophil % 92.0 %      Lymphocyte % 1.0 %      Monocyte % 1.0 %      Bands %  6.0 %      Neutrophils Absolute 15.09 10*3/mm3      Lymphocytes Absolute 0.15 10*3/mm3      Monocytes Absolute 0.15 10*3/mm3      Elliptocytes Slight/1+     WBC Morphology Normal     Platelet Morphology Normal    CBC Auto Differential [[id number]]  (Abnormal) Collected: 06/02/22 1553    Specimen: Blood Updated: 06/02/22 1709     WBC 15.40 10*3/mm3      RBC 4.84 10*6/mm3      Hemoglobin 12.1 g/dL      Hematocrit 38.7 %      MCV 80.0 fL      MCH 24.9 pg      MCHC 31.2 g/dL      RDW 17.8 %      RDW-SD 50.8 fl      MPV 7.4 fL      Platelets 461 10*3/mm3     Blood Culture - Blood, Blood, Venous Line [[id number]] Collected: 06/02/22 1650    Specimen: Blood, Venous Line Updated: 06/02/22 1653    D-dimer, Quantitative [[id number]]  (Abnormal) Collected: 06/02/22 1553    Specimen: Blood Updated: 06/02/22 1647     D-Dimer, Quantitative >35.20 mg/L (FEU)     Narrative:      Reference Range  --------------------------------------------------------------------     < 0.50   Negative Predictive Value  0.50-0.59   Indeterminate    >= 0.60   Probable VTE             A very low percentage of patients with DVT may yield D-Dimer results   below the cut-off of 0.50 mg/L FEU.  This is known to be more   prevalent in patients with distal DVT.             Results of this test should always be interpreted in conjunction with   the patient's medical history, clinical presentation and other   findings.  Clinical diagnosis should not be based on the result of   [place] D-Dimer alone.    aPTT [[id number]]  (Normal) Collected: 06/02/22 1553    Specimen: Blood Updated: 06/02/22 1645     PTT 65.0 seconds     Protime-INR [[id number]]  (Normal) Collected:  06/02/22 1553    Specimen: Blood Updated: 06/02/22 1645     Protime 10.9 Seconds      INR 1.06    TSH [[id number]]  (Abnormal) Collected: 06/02/22 1553    Specimen: Blood Updated: 06/02/22 1637     TSH 4.220 uIU/mL     Cortisol [[id number]] Collected: 06/02/22 1553    Specimen: Blood Updated: 06/02/22 1637     Cortisol 47.52 mcg/dL     Narrative:      Cortisol Reference Ranges:    Cortisol 6AM - 10AM Range: 6.02-18.40 mcg/dl  Cortisol 4PM - 8PM Range: 2.68-10.50 mcg/dl      Results may be falsely increased if patient taking Biotin.      Troponin [[id number]]  (Normal) Collected: 06/02/22 1553    Specimen: Blood Updated: 06/02/22 1637     Troponin T <0.010 ng/mL     Narrative:      Troponin T Reference Range:  <= 0.03 ng/mL-   Negative for AMI  >0.03 ng/mL-     Abnormal for myocardial necrosis.  Clinicians would have to utilize clinical acumen, EKG, Troponin and serial changes to determine if it is an Acute Myocardial Infarction or myocardial injury due to an underlying chronic condition.       Results may be falsely decreased if patient taking Biotin.      Comprehensive Metabolic Panel [[id number]]  (Abnormal) Collected: 06/02/22 1553    Specimen: Blood Updated: 06/02/22 1623     Glucose 129 mg/dL      BUN 26 mg/dL      Creatinine 1.84 mg/dL      Sodium 139 mmol/L      Potassium 4.4 mmol/L      Chloride 104 mmol/L      CO2 19.0 mmol/L      Calcium 9.3 mg/dL      Total Protein 6.2 g/dL      Albumin 3.80 g/dL      ALT (SGPT) 12 U/L      AST (SGOT) 18 U/L      Alkaline Phosphatase 48 U/L      Total Bilirubin 0.3 mg/dL      Globulin 2.4 gm/dL      A/G Ratio 1.6 g/dL      BUN/Creatinine Ratio 14.1     Anion Gap 16.0 mmol/L      eGFR 39.2 mL/min/1.73      Comment: National Kidney Foundation and American Society of Nephrology (ASN) Task Force recommended calculation based on the Chronic Kidney Disease Epidemiology Collaboration (CKD-EPI) equation refit without adjustment for race.       Narrative:      GFR Normal >60  Chronic  Kidney Disease <60  Kidney Failure <15      CK [[id number]]  (Normal) Collected: 06/02/22 1553    Specimen: Blood Updated: 06/02/22 1623     Creatine Kinase 59 U/L     Magnesium [[id number]]  (Normal) Collected: 06/02/22 1553    Specimen: Blood Updated: 06/02/22 1623     Magnesium 1.8 mg/dL     Blood Culture - Blood, Blood, Venous Line [[id number]] Collected: 06/02/22 1553    Specimen: Blood, Venous Line Updated: 06/02/22 1559    POC Lactate [[id number]]  (Normal) Collected: 06/02/22 1557    Specimen: Blood Updated: 06/02/22 1558     Lactate 0.8 mmol/L      Comment: Serial Number: [id number]:  [id number]              I reviewed the patient's new clinical results.    Assessment & Plan     Pulmonary emboli  -heparin gtt  -bilateral u/s lower ext  -oncology following    Hypotension-iv fluids and monitor  Covid 19+ asymptomatic  -afebrile/leukocytosis improved with antibiotics in ED  -BC pending  -denies any symptoms  -cxr negative  -cefepime    Right knee pain-xray today  Enteritis/colitis-diarrhea resolved  CKD-stable     Follicular NHL-oncology following  Anemia-hem following  HTN-holding meds       Diet:regular  DVT prophylaxis:heparin gtt  GI prophylaxis:pepcid  Code status:full      I discussed the patient's findings and my recommendations with patient.     RAJNI Bloom  06/03/22  11:11 EDT

## 2022-06-03 NOTE — CASE MANAGEMENT/SOCIAL WORK
Discharge Planning Assessment   Luis Daniel     Patient Name: Iván Fowler  MRN: [id number]  Today's Date: 6/3/2022    Admit Date: 6/2/2022     Discharge Needs Assessment     Row Name 06/03/22 1057       Living Environment    People in Home spouse    Current Living Arrangements home    Primary Care Provided by self    Provides Primary Care For no one    Family Caregiver if Needed spouse    Quality of Family Relationships helpful    Able to Return to Prior Arrangements yes       Resource/Environmental Concerns    Resource/Environmental Concerns none    Transportation Concerns none       Transition Planning    Patient/Family Anticipates Transition to home with family    Patient/Family Anticipated Services at Transition none    Transportation Anticipated family or friend will provide       Discharge Needs Assessment    Readmission Within the Last 30 Days no previous admission in last 30 days    Equipment Currently Used at Home none    Concerns to be Addressed denies needs/concerns at this time    Anticipated Changes Related to Illness none    Equipment Needed After Discharge none               Discharge Plan     Row Name 06/03/22 1057       Plan    Plan D/C Plan: COVID:  Anticipate home with spouse.    Patient/Family in Agreement with Plan yes    Plan Comments CM unable to reach patient on room phone. CM spoke to patient's wife on the phone. Patient lives with wife, is IADLs and drives. PCP and pharmacy verified-denies any difficulty affording meds. Wife denies any d/c needs at this time and can provide transport at d/c. Barrier to D/C: IV abx, heparin gtt, heme/onc consult.                  Expected Discharge Date and Time     Expected Discharge Date Expected Discharge Time    Jun 5, 2022          Demographic Summary     Row Name 06/03/22 1056       General Information    Admission Type inpatient    Arrived From emergency department    Referral Source admission list    Reason for Consult discharge planning     Preferred Language English       Contact Information    Permission Granted to Share Info With                Functional Status     Row Name 06/03/22 1057       Functional Status    Usual Activity Tolerance good    Current Activity Tolerance good       Functional Status, IADL    Medications independent    Meal Preparation independent    Housekeeping independent    Laundry independent    Shopping independent       Mental Status Summary    Recent Changes in Mental Status/Cognitive Functioning unable to assess    Mental Status Comments spoke to patient's wife on the phone              Phone communication or documentation only - no physical contact with patient or family.        [place] [address]    Office: [phone]  Fax: [phone]

## 2022-06-03 NOTE — CONSULTS
Hematology/Oncology Inpatient Consultation    Patient name: Iván Fowler  : 1950  MRN: [id number]  Referring Provider: Dr. Morales  Reason for Consultation: Right lower lobe PE, recurrent COVID-19 infection, NHL stage III in remission on maintenance rituximab    Chief complaint: Right lung PE, recurrent COVID-19 infection    History of present illness:    71 y.o. male admitted to to [facility] through the ED on 2022 after presenting with weakness and lightheadedness with hypotension leading to a fall with pain and edema in the right knee.  PMH is significant for NHL on maintenance rituximab and history of hospitalization in 2022 with COVID-19 pneumonia at which time he received remdesivir and dexamethasone.  He denied any fevers, chills, cough, chest pain, night sweats, nausea.  He reported 1 episode of diarrhea in the morning of admission.  CXR was negative for acute findings.  CBC revealed WBC 15.4, hemoglobin 12.1, MCV 80, RDW 17.8, platelets 461,000.  CMP revealed creatinine elevated to 1.84 with BUN 26 and LFTs were not elevated.  Coags were okay.  D-dimer was high at 35.2 (0-0.59).  Troponin was normal.  COVID-19 screen was positive.  CT PE protocol was performed secondary to high D-dimer and revealed right lower lobe PE without evidence of heart strain.  There was no lymphadenopathy, mass, or pneumonia present.  CT abdomen and pelvis showed a small amount of ascites and diffuse wall thickening throughout the small bowel with mucosal hyperenhancement and mesenteric hyperemia.  It was felt this could possibly relate to enteritis/colitis versus inflammatory bowel disease or shock bowel with hypovolemic shock.  Arterial and venous flow from the bowel appeared intact.  There was no lymphadenopathy.  He was started on heparin drip and IV antibiotics. At time of consultation 6/3/2022 hemoglobin had dropped to 9.0.    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  He received Bendamustine with rituximab for his lymphoma with complete response and is now on rituximab maintenance.  He received cycle 6 rituximab on 5/19/2022.  He had been hospitalized with COVID-19 pneumonia in February 2022 at which time he received remdesivir and dexamethasone.  Vitamin B12 deficiency has been treated with vitamin B12 injections.  He has 3 weekly injections remaining with the most recent dose given 6/2/2022.  Iron studies in April 2022 had iron sat of 9% and ferritin 356.  Folate level was normal and there was no hemolysis.  He has been on ferrous sulfate 325 mg by mouth daily.  In March 2022 serum calcium was mildly elevated at 10.5 with ionized calcium, vitamin D, and PTH all normal.  He was last seen as an outpatient on 5/3/2022 where CBC revealed WBC 5.29, hemoglobin 8.46, and platelets 396,500.  Pernicious anemia work-up was negative.  Stool Hemoccult was negative.  ACE was 40 (9-67).  Additional hypercalcemia work-up revealed vitamin A 48.7 (30-75) and aluminum was 4 (less than 9).  Serum calcium at that visit had normalized at 8.7.    PCP: Mat Aiken MD    History:  Past Medical History:   Diagnosis Date   • Hyperlipidemia    • Hypertension    ,   Past Surgical History:   Procedure Laterality Date   • APPENDECTOMY     • US GUIDED LYMPH NODE BIOPSY  9/25/2020   • VASECTOMY Bilateral    , No family history on file.,   Social History     Tobacco Use   • Smoking status: Never Smoker   Substance Use Topics   • Alcohol use: Yes   • Drug use: Never   ,   Medications Prior to Admission   Medication Sig Dispense Refill Last Dose   • acetaminophen (TYLENOL) 325 MG tablet Take 2 tablets by mouth Every 4 (Four) Hours As Needed for Mild Pain .      • albuterol sulfate  (90 Base) MCG/ACT  inhaler Inhale 2 puffs 4 (Four) Times a Day. 18 g 0    • budesonide-formoterol (Symbicort) 80-4.5 MCG/ACT inhaler Inhale 2 puffs 2 (Two) Times a Day. 1 each 12    • fenofibrate (TRICOR) 145 MG tablet Take 145 mg by mouth Daily.      • finasteride (PROSCAR) 5 MG tablet Take 5 mg by mouth Every Night.      • hydrALAZINE (APRESOLINE) 25 MG tablet Take 25 mg by mouth 2 (Two) Times a Day.      , Scheduled Meds:  albuterol sulfate HFA, 2 puff, Inhalation, 4x Daily - RT  cefepime, 2 g, Intravenous, Q12H  famotidine, 40 mg, Oral, Daily  finasteride, 5 mg, Oral, Nightly  sodium chloride, 3 mL, Intravenous, Q12H    , Continuous Infusions:  heparin, 18 Units/kg/hr, Last Rate: 15.1 Units/kg/hr (06/03/22 0923)  Pharmacy to Dose Cefepime,   Pharmacy to dose vancomycin,     , PRN Meds:  •  acetaminophen  •  heparin  •  heparin  •  nitroglycerin  •  ondansetron  •  Pharmacy to Dose Cefepime  •  Pharmacy to dose vancomycin  •  potassium chloride  •  potassium chloride  •  [COMPLETED] Insert peripheral IV **AND** sodium chloride  •  sodium chloride  •  Vancomycin Pharmacy Intermittent/Pulse Dosing   Allergies:  Patient has no known allergies.    ROS:  Review of Systems   Constitutional: Negative for activity change, chills, fatigue, fever and unexpected weight change.   HENT: Negative for congestion, dental problem, hearing loss, mouth sores, nosebleeds, sore throat and trouble swallowing.    Eyes: Negative for photophobia and visual disturbance.   Respiratory: Negative for cough, chest tightness and shortness of breath.    Cardiovascular: Negative for chest pain, palpitations and leg swelling.   Gastrointestinal: Negative for abdominal distention, abdominal pain, blood in stool, constipation, diarrhea, nausea and vomiting.   Endocrine: Negative for cold intolerance and heat intolerance.   Genitourinary: Negative for decreased urine volume, difficulty urinating, frequency, hematuria and urgency.   Musculoskeletal: Positive for joint  "swelling (Right knee edema and pain). Negative for arthralgias and gait problem.   Skin: Negative for rash and wound.   Neurological: Positive for light-headedness (Resolved post hospitalization). Negative for dizziness, tremors, seizures, weakness, numbness and headaches.   Hematological: Negative for adenopathy. Does not bruise/bleed easily.   Psychiatric/Behavioral: Negative for confusion and hallucinations. The patient is not nervous/anxious.    All other systems reviewed and are negative.       Objective     Vital Signs:   /62   Pulse 85   Temp 98.2 °F (36.8 °C) (Oral)   Resp 18   Ht 180.3 cm (71\")   Wt 73.9 kg (162 lb 14.7 oz)   SpO2 97%   BMI 22.72 kg/m²     Physical Exam:  Physical Exam  Vitals and nursing note reviewed.   Constitutional:       General: He is not in acute distress.     Appearance: Normal appearance. He is well-developed. He is not diaphoretic.   HENT:      Head: Normocephalic and atraumatic.      Comments: Male pattern hair thinning     Right Ear: External ear normal.      Left Ear: External ear normal.      Nose: Nose normal.      Mouth/Throat:      Mouth: Mucous membranes are moist.      Pharynx: Oropharynx is clear. No oropharyngeal exudate or posterior oropharyngeal erythema.   Eyes:      General: No scleral icterus.     Extraocular Movements: Extraocular movements intact.      Conjunctiva/sclera: Conjunctivae normal.      Pupils: Pupils are equal, round, and reactive to light.      Comments: Eyeglasses   Cardiovascular:      Rate and Rhythm: Normal rate and regular rhythm.      Heart sounds: Normal heart sounds. No murmur heard.     Comments: Cardiac monitor leads.  Right chest wall [id number].  Pulmonary:      Effort: Pulmonary effort is normal. No respiratory distress.      Breath sounds: Normal breath sounds. No wheezing or rales.   Chest:   Breasts:      Right: No supraclavicular adenopathy.      Left: No supraclavicular adenopathy.       Abdominal:      General: " Bowel sounds are normal. There is no distension.      Palpations: Abdomen is soft. There is no mass.      Tenderness: There is no abdominal tenderness. There is no guarding.   Genitourinary:     Comments: Deferred   Musculoskeletal:         General: Swelling (Right medial knee) and tenderness (Right knee) present. No deformity. Normal range of motion.      Cervical back: Normal range of motion and neck supple.      Comments: Left hand O2 monitor.   Lymphadenopathy:      Cervical: No cervical adenopathy.      Upper Body:      Right upper body: No supraclavicular adenopathy.      Left upper body: No supraclavicular adenopathy.   Skin:     General: Skin is warm and dry.      Coloration: Skin is not pale.      Findings: No bruising, erythema or rash.   Neurological:      General: No focal deficit present.      Mental Status: He is alert and oriented to person, place, and time.      Coordination: Coordination normal.   Psychiatric:         Mood and Affect: Mood normal.         Behavior: Behavior normal.         Thought Content: Thought content normal.          Results Review:  Lab Results (last 48 hours)     Procedure Component Value Units Date/Time    Pathology Consultation [[id number]] Collected: 06/03/22 0331    Specimen: Blood, Venous Line Updated: 06/03/22 0858     Final Diagnosis --     Left shifted granulocytes  Anemia  No blasts identified       Case Report --     Surgical Pathology Report                         Case: [id number]                                  Authorizing Provider:  Rebeka Morales MD            Collected:           06/03/2022 03:31 AM          Ordering Location:     [facility]       Received:            06/03/2022 07:32 AM                                 PROGRESS CARE                                                                Pathologist:           Lakhwinder Tavera MD                                                            Specimen:    Blood, Venous Line                                                                          MRSA Screen, PCR (Inpatient) - Swab, Nares [[id number]]  (Normal) Collected: 06/03/22 0333    Specimen: Swab from Nares Updated: 06/03/22 0451     MRSA PCR No MRSA Detected    aPTT [[id number]]  (Abnormal) Collected: 06/03/22 0331    Specimen: Blood Updated: 06/03/22 0412     PTT >139.0 seconds     Manual Differential [[id number]]  (Abnormal) Collected: 06/03/22 0331    Specimen: Blood Updated: 06/03/22 0410     Neutrophil % 85.0 %      Lymphocyte % 3.0 %      Monocyte % 5.0 %      Bands %  6.0 %      Blasts % 1.0 %      Neutrophils Absolute 8.19 10*3/mm3      Lymphocytes Absolute 0.27 10*3/mm3      Monocytes Absolute 0.45 10*3/mm3      Elliptocytes Slight/1+     WBC Morphology Normal     Large Platelets Slight/1+    Path Consult Reflex [[id number]] Collected: 06/03/22 0331    Specimen: Blood Updated: 06/03/22 0410     Pathology Review Yes    CBC & Differential [[id number]]  (Abnormal) Collected: 06/03/22 0331    Specimen: Blood Updated: 06/03/22 0410    Narrative:      The following orders were created for panel order CBC & Differential.  Procedure                               Abnormality         Status                     ---------                               -----------         ------                     CBC Auto Differential[[id number]]        Abnormal            Final result               Scan Slide[[id number]]                                       Final result                 Please view results for these tests on the individual orders.    CBC Auto Differential [[id number]]  (Abnormal) Collected: 06/03/22 0331    Specimen: Blood Updated: 06/03/22 0410     WBC 9.00 10*3/mm3      RBC 3.53 10*6/mm3      Hemoglobin 9.0 g/dL      Comment: Result checked         Hematocrit 28.0 %      Comment: Result checked         MCV 79.3 fL      MCH 25.5 pg      MCHC 32.2 g/dL      RDW 17.7 %      RDW-SD 49.4 fl      MPV 7.4 fL      Platelets 335 10*3/mm3     Narrative:      The previously  reported component NRBC is no longer being reported. Previous result was 0.0 /100 WBC (Reference Range: 0.0-0.2 /100 WBC) on 6/3/2022 at 0344 EDT.    Scan Slide [[id number]] Collected: 06/03/22 0331    Specimen: Blood Updated: 06/03/22 0410     Scan Slide --     Comment: See Manual Differential Results       Basic Metabolic Panel [[id number]]  (Abnormal) Collected: 06/03/22 0331    Specimen: Blood Updated: 06/03/22 0358     Glucose 105 mg/dL      BUN 29 mg/dL      Creatinine 1.64 mg/dL      Sodium 140 mmol/L      Potassium 4.1 mmol/L      Chloride 108 mmol/L      CO2 21.0 mmol/L      Calcium 8.4 mg/dL      BUN/Creatinine Ratio 17.7     Anion Gap 11.0 mmol/L      eGFR 45.0 mL/min/1.73      Comment: National Kidney Foundation and American Society of Nephrology (ASN) Task Force recommended calculation based on the Chronic Kidney Disease Epidemiology Collaboration (CKD-EPI) equation refit without adjustment for race.       Narrative:      GFR Normal >60  Chronic Kidney Disease <60  Kidney Failure <15      aPTT [[id number]]  (Abnormal) Collected: 06/02/22 2253    Specimen: Blood Updated: 06/03/22 0000     .1 seconds     Urinalysis With Microscopic If Indicated (No Culture) - Urine, Clean Catch [[id number]]  (Abnormal) Collected: 06/02/22 2008    Specimen: Urine, Clean Catch Updated: 06/02/22 2033     Color, UA Dark Yellow     Appearance, UA Clear     pH, UA 6.0     Specific Gravity, UA 1.045     Glucose, UA Negative     Ketones, UA Negative     Bilirubin, UA Negative     Blood, UA Negative     Protein, UA Trace     Leuk Esterase, UA Negative     Nitrite, UA Negative     Urobilinogen, UA 0.2 E.U./dL    Narrative:      Urine microscopic not indicated.    COVID PRE-OP / PRE-PROCEDURE SCREENING ORDER (NO ISOLATION) - Swab, Nasopharynx [[id number]]  (Abnormal) Collected: 06/02/22 1712    Specimen: Swab from Nasopharynx Updated: 06/02/22 1738    Narrative:      The following orders were created for panel order COVID PRE-OP /  PRE-PROCEDURE SCREENING ORDER (NO ISOLATION) - Swab, Nasopharynx.  Procedure                               Abnormality         Status                     ---------                               -----------         ------                     COVID-19,CEPHEID/LUCIA,CO...[[id number]]  Abnormal            Final result                 Please view results for these tests on the individual orders.    COVID-19,CEPHEID/LUCIA,COR/SANDIE/PAD/LYNNETTE IN-HOUSE(OR EMERGENT/ADD-ON),NP SWAB IN TRANSPORT MEDIA 3-4 HR TAT, RT-PCR - Swab, Nasopharynx [[id number]]  (Abnormal) Collected: 06/02/22 1712    Specimen: Swab from Nasopharynx Updated: 06/02/22 1738     COVID19 Detected    Narrative:      Fact sheet for providers: https://www.fda.gov/media/695244/download     Fact sheet for patients: https://www.fda.gov/media/231961/download  Fact sheet for providers: https://www.fda.gov/media/733735/download    Fact sheet for patients: https://www.fda.gov/media/409395/download    Test performed by PCR.    CBC & Differential [[id number]]  (Abnormal) Collected: 06/02/22 1553    Specimen: Blood Updated: 06/02/22 1709    Narrative:      The following orders were created for panel order CBC & Differential.  Procedure                               Abnormality         Status                     ---------                               -----------         ------                     CBC Auto Differential[[id number]]        Abnormal            Final result               Scan Slide[[id number]]                                       Final result                 Please view results for these tests on the individual orders.    Scan Slide [[id number]] Collected: 06/02/22 1553    Specimen: Blood Updated: 06/02/22 1709     Scan Slide --     Comment: See Manual Differential Results       Manual Differential [[id number]]  (Abnormal) Collected: 06/02/22 1553    Specimen: Blood Updated: 06/02/22 1709     Neutrophil % 92.0 %      Lymphocyte % 1.0 %      Monocyte % 1.0 %      Bands %   6.0 %      Neutrophils Absolute 15.09 10*3/mm3      Lymphocytes Absolute 0.15 10*3/mm3      Monocytes Absolute 0.15 10*3/mm3      Elliptocytes Slight/1+     WBC Morphology Normal     Platelet Morphology Normal    CBC Auto Differential [[id number]]  (Abnormal) Collected: 06/02/22 1553    Specimen: Blood Updated: 06/02/22 1709     WBC 15.40 10*3/mm3      RBC 4.84 10*6/mm3      Hemoglobin 12.1 g/dL      Hematocrit 38.7 %      MCV 80.0 fL      MCH 24.9 pg      MCHC 31.2 g/dL      RDW 17.8 %      RDW-SD 50.8 fl      MPV 7.4 fL      Platelets 461 10*3/mm3     Blood Culture - Blood, Blood, Venous Line [[id number]] Collected: 06/02/22 1650    Specimen: Blood, Venous Line Updated: 06/02/22 1653    D-dimer, Quantitative [[id number]]  (Abnormal) Collected: 06/02/22 1553    Specimen: Blood Updated: 06/02/22 1647     D-Dimer, Quantitative >35.20 mg/L (FEU)     Narrative:      Reference Range  --------------------------------------------------------------------     < 0.50   Negative Predictive Value  0.50-0.59   Indeterminate    >= 0.60   Probable VTE             A very low percentage of patients with DVT may yield D-Dimer results   below the cut-off of 0.50 mg/L FEU.  This is known to be more   prevalent in patients with distal DVT.             Results of this test should always be interpreted in conjunction with   the patient's medical history, clinical presentation and other   findings.  Clinical diagnosis should not be based on the result of   [place] D-Dimer alone.    aPTT [[id number]]  (Normal) Collected: 06/02/22 1553    Specimen: Blood Updated: 06/02/22 1645     PTT 65.0 seconds     Protime-INR [[id number]]  (Normal) Collected: 06/02/22 1553    Specimen: Blood Updated: 06/02/22 1645     Protime 10.9 Seconds      INR 1.06    TSH [[id number]]  (Abnormal) Collected: 06/02/22 1553    Specimen: Blood Updated: 06/02/22 1637     TSH 4.220 uIU/mL     Cortisol [[id number]] Collected: 06/02/22 1553    Specimen: Blood Updated: 06/02/22  1637     Cortisol 47.52 mcg/dL     Narrative:      Cortisol Reference Ranges:    Cortisol 6AM - 10AM Range: 6.02-18.40 mcg/dl  Cortisol 4PM - 8PM Range: 2.68-10.50 mcg/dl      Results may be falsely increased if patient taking Biotin.      Troponin [[id number]]  (Normal) Collected: 06/02/22 1553    Specimen: Blood Updated: 06/02/22 1637     Troponin T <0.010 ng/mL     Narrative:      Troponin T Reference Range:  <= 0.03 ng/mL-   Negative for AMI  >0.03 ng/mL-     Abnormal for myocardial necrosis.  Clinicians would have to utilize clinical acumen, EKG, Troponin and serial changes to determine if it is an Acute Myocardial Infarction or myocardial injury due to an underlying chronic condition.       Results may be falsely decreased if patient taking Biotin.      Comprehensive Metabolic Panel [[id number]]  (Abnormal) Collected: 06/02/22 1553    Specimen: Blood Updated: 06/02/22 1623     Glucose 129 mg/dL      BUN 26 mg/dL      Creatinine 1.84 mg/dL      Sodium 139 mmol/L      Potassium 4.4 mmol/L      Chloride 104 mmol/L      CO2 19.0 mmol/L      Calcium 9.3 mg/dL      Total Protein 6.2 g/dL      Albumin 3.80 g/dL      ALT (SGPT) 12 U/L      AST (SGOT) 18 U/L      Alkaline Phosphatase 48 U/L      Total Bilirubin 0.3 mg/dL      Globulin 2.4 gm/dL      A/G Ratio 1.6 g/dL      BUN/Creatinine Ratio 14.1     Anion Gap 16.0 mmol/L      eGFR 39.2 mL/min/1.73      Comment: National Kidney Foundation and American Society of Nephrology (ASN) Task Force recommended calculation based on the Chronic Kidney Disease Epidemiology Collaboration (CKD-EPI) equation refit without adjustment for race.       Narrative:      GFR Normal >60  Chronic Kidney Disease <60  Kidney Failure <15      CK [[id number]]  (Normal) Collected: 06/02/22 1553    Specimen: Blood Updated: 06/02/22 1623     Creatine Kinase 59 U/L     Magnesium [[id number]]  (Normal) Collected: 06/02/22 1553    Specimen: Blood Updated: 06/02/22 1623     Magnesium 1.8 mg/dL     Blood  Culture - Blood, Blood, Venous Line [[id number]] Collected: 06/02/22 1553    Specimen: Blood, Venous Line Updated: 06/02/22 1559    POC Lactate [[id number]]  (Normal) Collected: 06/02/22 1557    Specimen: Blood Updated: 06/02/22 1558     Lactate 0.8 mmol/L      Comment: Serial Number: [id number]:  [id number]              Pending Results: iron, TIBC, ferritin, SPEP, right knee xray, BLE venous doppler    Imaging Reviewed:   CT Abdomen Pelvis With Contrast    Result Date: 6/2/2022  1.There is abnormal appearance of the majority of the small bowel and the cecum and ascending colon demonstrating low attenuation wall thickening likely representing submucosal edema, mucosal enhancement and mesenteric hyperemia with reactive stranding and fluid around the cecum. This appearance could be due to enteritis/colitis. This could be due to inflammatory bowel disease. This could be related to shock bowel in the setting of hypovolemic shock. Arterial flow and venous outflow from the bowel appears intact. 2.Small volume ascites, likely reactive.  Electronically Signed By-Sherif Giraldo MD On:6/2/2022 6:03 PM This report was finalized on [id number] by  Sherif Giraldo MD.    XR Chest 1 View    Result Date: 6/2/2022  1.An acute pulmonary process is not apparent.  Electronically Signed By-Duncan Atkins MD On:6/2/2022 4:27 PM This report was finalized on [id number] by  Duncan Atkins MD.    CT Angiogram Chest Pulmonary Embolism    Result Date: 6/2/2022  1. Pulmonary embolus within right lower lobe segmental branch pulmonary artery. No findings to indicate right heart strain. 2. Cardiomegaly. 3. No intrathoracic adenopathy. 4. Please see separately dictated abdominal CT for findings below the diaphragm.  Electronically Signed By-Orlando Mckee MD On:6/2/2022 5:56 PM This report was finalized on [id number] by  Orlando Mckee MD.      I have reviewed the patient's labs, imaging, reports, and other clinician documentation.          Assessment & Plan       ASSESSMENT  1. Acute right lower lobe PE-likely provoked by recurrent or persistent COVID-19 infection and no indication for thrombophilia work-up at present.  No respiratory symptoms and no pneumonia.  History of hospitalization with COVID-19 PNA February 2022.  D-dimer is high.  On heparin drip.  Checking BLE venous Doppler.  Plan to transition to Eliquis on discharge.  2. Stage III follicular NHL- in complete remission and on maintenance rituximab last dosed 5/19 as outpatient.  As remains in complete remission and rituximab suppressive B-cell function long-term causing immune compromise, would consider stopping maintenance therapy early.  3. Anemia/iron deficiency anemia/vitamin B12 deficiency- he has 3 remaining weekly vitamin B12 injections with the next due 6/9.  Continue daily oral iron and we will check iron studies. On Pepcid. Recent outpatient stool heme negative.  If he remains anemic after iron and B12 replacement he may need GI work-up performed prior to treatment for anemia secondary to CKD.  4. Recurrent COVID-19 infection-hospitalized February 2022 with COVID-19 PNA.  No PNA on CT or CXR.  Has had recent diarrhea.  No fever.  Per primary team.  5. Hypotension-supportive care per primary team.  6. Enteritis/colitis versus shock bowel-he did have some diarrhea the a.m. of admission and has now resolved.  On cefepime and vancomycin.  Per primary team.  7. CKD stage III-managed by Dr. Castillo as outpatient.      PLAN  1. BLE venous Doppler.  2. Right knee x-ray.  3. Iron studies.  4. Continue daily ferrous sulfate and weekly vitamin B12 injections (due 6/9).  5. Continue heparin drip.  6. Case management to evaluate insurance coverage for Eliquis.  7. Consider stopping rituximab maintenance.    Note prepared by LIANG Do.  Patient seen and examined by Madi Pringle MD.  Electronically signed by RAJNI Zhu, 06/03/22, 11:10 AM EDT.      I have personally  performed a face-to-face diagnostic evaluation on this patient via telehealth.  I have discussed the case with Meri Tesfaye NP, have edited/reviewed the note, and agree with the care plan.  The patient is complaining of right knee pain.  On examination he does have swelling of the right medial knee.  CT has revealed right lower lobe PE.  He is COVID-19 positive...  Will check lower extremity venous Doppler and transition to DOAC's.          I discussed the patients findings and my recommendations with patient and family.    Thank you for this consult.  We will be happy to follow along in the care of this patient.     Electronically signed by Madi Pringle MD, 06/04/22, 12:02 AM EDT.

## 2022-06-03 NOTE — PAYOR COMM NOTE
"UTILIZATION REVIEW  SORAYA ROWLEY RN   PH: [phone]  FAX: [phone]    [facility]  NPI# [id number]  TID # [id number]  ============================    INPATIENT REQUEST     ===============================    Indicia® Completed 6/3/2022 10:19       Criteria Set Name - Subset   Pulmonary Embolism RRG - Inpatient Care       Selected?   Yes - Inpatient Care is selected for the Pulmonary Embolism RRG criteria set.      Criteria Review      Inpatient Care    Most Recent : Soraya Rowley Most Recent Date: 6/3/2022 10:19:39 EDST    (X) Admission is indicated for  1 or more  of the following :       (X) Vital sign abnormality       6/3/2022 10:19:39 EDST by Soraya Rowley          AND BP 88/57 ON ADMISSION       (X) Syncope       6/3/2022 10:19:39 EDST by Soraya Rowley         LIGHT HEADED - NEAR SYNCOPE/FALL    Notes:    6/3/2022 10:19:39 EDST by Soraya Rowley    Subject: Admission    INPT ORDER      DX: PE, SYNCOPE, COLITIS, COVID +      LABS: CREAT 1.84 > 1.64, WBC 15.4,  HGB 12.1 > 9, COVID +      MEDS: HEPARIN IV GTT, CEFEPIME IV, 3 L LR BOLUS, VANC IV,      HEMATOLOGY CONSULT PDG                Iván Fowler (71 y.o. Male)             Date of Birth   1950    Social Security Number       Address   [address] [place] [address] [place] [address]    Home Phone   [phone]    MRN   [id number]       [employer]   Unknown    Marital Status                               Admission Date   6/2/22    Admission Type   Emergency    Admitting Provider   Rebeka Morales MD    Attending Provider   Rebeka Morales MD    Department, Room/Bed   [facility] PROGRESS CARE, 2132/1       Discharge Date       Discharge Disposition       Discharge Destination                               Attending Provider: Rebeka Morales MD    Allergies: No Known Allergies    Isolation: Enh Drop/Con   Infection: COVID (confirmed) (06/02/22)   Code Status: CPR   Advance Care Planning Activity    Ht: 180.3 cm (71\")   Wt: 73.9 kg " (162 lb 14.7 oz)    Admission Cmt: None   Principal Problem: None                Active Insurance as of 6/2/2022     Primary Coverage     Payor Plan Insurance Group Employer/Plan Group    [employer]UE [employer]OSS BLUE [employer]IELD PPO [id number]     Payor Plan Address Payor Plan Phone Number Payor Plan Fax Number Effective Dates    [address] [phone]  1/1/2019 - None Entered    [facility] [id number]       Subscriber Name Subscriber Birth Date Member ID       GUILLERMINA DAVIS 1950 [id number]                 Emergency Contacts      (Rel.) Home Phone Work Phone Mobile Phone    VANESSA DAVIS (Spouse) -- -- [phone]               Emergency Department Notes      Lexis Lara RN at 06/02/22 1702        Pt reports some numbness in R pinky and ring finger. Provider Celia notified.    Electronically signed by Lexis Lara RN at 06/02/22 1703     Rolando Yang MD at 06/02/22 0556          Subjective   Chief complaint weakness lightheaded for like and 1 of passout low blood pressure    History of present illness 71-year-old male 1 day history of weakness lightheadedness feeling like he might passout low blood pressure he did fall tonight and twisted his knee.  He denies any head injury.  No fever chills no black or bloody stool no chest pain neck arm jaw pain no abdominal pain no vomiting or diarrhea no one at home with similar illness.  No foreign travels antibiotic use.  No recent long car ride plane ride immobilization symptoms ongoing all day today General location no pain continuous he has been at home worse when he stands up better when he rest with the above associated symptoms.  Pain continues today severe.  No speech difficulty no paralysis no facial asymmetry and no visual disturbance.  Patient did have a diarrheal illness about 2 weeks ago but it resolved          Review of Systems   Constitutional: Negative for chills and fever.   HENT: Negative for congestion  and sinus pressure.    Eyes: Negative for photophobia and visual disturbance.   Respiratory: Negative for chest tightness and shortness of breath.    Cardiovascular: Negative for chest pain and palpitations.   Gastrointestinal: Negative for abdominal pain, blood in stool and vomiting.   Endocrine: Negative for cold intolerance and heat intolerance.   Genitourinary: Negative for difficulty urinating and dysuria.   Musculoskeletal: Negative for back pain and neck pain.   Skin: Negative for color change, rash and wound.   Neurological: Positive for dizziness, weakness and light-headedness. Negative for facial asymmetry, speech difficulty and headaches.   Psychiatric/Behavioral: Negative for agitation, behavioral problems and confusion.       Past Medical History:   Diagnosis Date   • Hyperlipidemia    • Hypertension      Lymphoma  No Known Allergies    Past Surgical History:   Procedure Laterality Date   • APPENDECTOMY     • US GUIDED LYMPH NODE BIOPSY  9/25/2020   • VASECTOMY Bilateral        No family history on file.    Social History     Socioeconomic History   • Marital status:    Tobacco Use   • Smoking status: Never Smoker   Substance and Sexual Activity   • Alcohol use: Yes   • Drug use: Never   • Sexual activity: Defer     Prior to Admission medications    Medication Sig Start Date End Date Taking? Authorizing Provider   acetaminophen (TYLENOL) 325 MG tablet Take 2 tablets by mouth Every 4 (Four) Hours As Needed for Mild Pain . 2/16/22   Rebeka Morales MD   albuterol sulfate  (90 Base) MCG/ACT inhaler Inhale 2 puffs 4 (Four) Times a Day. 2/16/22   Rebeka Morales MD   budesonide-formoterol (Symbicort) 80-4.5 MCG/ACT inhaler Inhale 2 puffs 2 (Two) Times a Day. 2/16/22   Rebeka Morales MD   dexamethasone (DECADRON) 2 MG tablet 4 mg po q day x 2, 2 mg po q day x 4 then stop 2/16/22   Rebeka Morales MD   fenofibrate (TRICOR) 145 MG tablet Take 145 mg by mouth Daily. 9/10/20   Provider, MD Rocael    finasteride (PROSCAR) 5 MG tablet Take 5 mg by mouth Every Night.    Provider, MD Rocael   hydrALAZINE (APRESOLINE) 25 MG tablet Take 25 mg by mouth 2 (Two) Times a Day. 11/17/21   ProviderRocael MD     Patient does receive chemotherapy      Objective   Physical Exam  Constitutional 71-year-old male awake alert no acute distress but blood pressure was 80 systolic.  Heart rate 113.  Patient's map is 65.  HEENT extraocular muscles are intact pupils equal round react there is no photophobia mouth clear.  Neck supple no adenopathy no JVD no bruits lungs clear no retraction no use of accessories heart regular without murmur tachycardic abdomen soft without tenderness good bowel sounds no pulsatile masses.  Extremities pulses are equal throughout upper and lower extremities no edema cords or Homans' sign or evidence of DVT.  Skin warm dry without rashes or cellulitic changes.  Neurologic awake alert orientated x4 no facial asymmetry normal speech no drift the arms or legs normal finger-to-nose there is no focal deficits.  Procedures          ED Course      Results for orders placed or performed during the hospital encounter of 06/02/22   COVID-19,CEPHEID/LUCIA,COR/SANDIE/PAD/LYNNETTE IN-HOUSE(OR EMERGENT/ADD-ON),NP SWAB IN TRANSPORT MEDIA 3-4 HR TAT, RT-PCR - Swab, Nasopharynx    Specimen: Nasopharynx; Swab   Result Value Ref Range    COVID19 Detected (C) Not Detected - Ref. Range   Comprehensive Metabolic Panel    Specimen: Blood   Result Value Ref Range    Glucose 129 (H) 65 - 99 mg/dL    BUN 26 (H) 8 - 23 mg/dL    Creatinine 1.84 (H) 0.76 - 1.27 mg/dL    Sodium 139 136 - 145 mmol/L    Potassium 4.4 3.5 - 5.2 mmol/L    Chloride 104 98 - 107 mmol/L    CO2 19.0 (L) 22.0 - 29.0 mmol/L    Calcium 9.3 8.6 - 10.5 mg/dL    Total Protein 6.2 6.0 - 8.5 g/dL    Albumin 3.80 3.50 - 5.20 g/dL    ALT (SGPT) 12 1 - 41 U/L    AST (SGOT) 18 1 - 40 U/L    Alkaline Phosphatase 48 39 - 117 U/L    Total Bilirubin 0.3 0.0 - 1.2 mg/dL     Globulin 2.4 gm/dL    A/G Ratio 1.6 g/dL    BUN/Creatinine Ratio 14.1 7.0 - 25.0    Anion Gap 16.0 (H) 5.0 - 15.0 mmol/L    eGFR 39.2 (L) >60.0 mL/min/1.73   Protime-INR    Specimen: Blood   Result Value Ref Range    Protime 10.9 9.6 - 11.7 Seconds    INR 1.06 0.93 - 1.10   aPTT    Specimen: Blood   Result Value Ref Range    PTT 65.0 61.0 - 76.5 seconds   Urinalysis With Microscopic If Indicated (No Culture) - Urine, Clean Catch    Specimen: Urine, Clean Catch   Result Value Ref Range    Color, UA Dark Yellow (A) Yellow, Straw    Appearance, UA Clear Clear    pH, UA 6.0 5.0 - 8.0    Specific Gravity, UA 1.045 (H) 1.005 - 1.030    Glucose, UA Negative Negative    Ketones, UA Negative Negative    Bilirubin, UA Negative Negative    Blood, UA Negative Negative    Protein, UA Trace (A) Negative    Leuk Esterase, UA Negative Negative    Nitrite, UA Negative Negative    Urobilinogen, UA 0.2 E.U./dL 0.2 - 1.0 E.U./dL   Troponin    Specimen: Blood   Result Value Ref Range    Troponin T <0.010 0.000 - 0.030 ng/mL   D-dimer, Quantitative    Specimen: Blood   Result Value Ref Range    D-Dimer, Quantitative >35.20 (H) 0.00 - 0.59 mg/L (FEU)   TSH    Specimen: Blood   Result Value Ref Range    TSH 4.220 (H) 0.270 - 4.200 uIU/mL   Magnesium    Specimen: Blood   Result Value Ref Range    Magnesium 1.8 1.6 - 2.4 mg/dL   CK    Specimen: Blood   Result Value Ref Range    Creatine Kinase 59 20 - 200 U/L   CBC Auto Differential    Specimen: Blood   Result Value Ref Range    WBC 15.40 (H) 3.40 - 10.80 10*3/mm3    RBC 4.84 4.14 - 5.80 10*6/mm3    Hemoglobin 12.1 (L) 13.0 - 17.7 g/dL    Hematocrit 38.7 37.5 - 51.0 %    MCV 80.0 79.0 - 97.0 fL    MCH 24.9 (L) 26.6 - 33.0 pg    MCHC 31.2 (L) 31.5 - 35.7 g/dL    RDW 17.8 (H) 12.3 - 15.4 %    RDW-SD 50.8 37.0 - 54.0 fl    MPV 7.4 6.0 - 12.0 fL    Platelets 461 (H) 140 - 450 10*3/mm3   Cortisol    Specimen: Blood   Result Value Ref Range    Cortisol 47.52   mcg/dL   Scan Slide    Specimen:  Blood   Result Value Ref Range    Scan Slide     Manual Differential    Specimen: Blood   Result Value Ref Range    Neutrophil % 92.0 (H) 42.7 - 76.0 %    Lymphocyte % 1.0 (L) 19.6 - 45.3 %    Monocyte % 1.0 (L) 5.0 - 12.0 %    Bands %  6.0 (H) 0.0 - 5.0 %    Neutrophils Absolute 15.09 (H) 1.70 - 7.00 10*3/mm3    Lymphocytes Absolute 0.15 (L) 0.70 - 3.10 10*3/mm3    Monocytes Absolute 0.15 0.10 - 0.90 10*3/mm3    Elliptocytes Slight/1+ None Seen    WBC Morphology Normal Normal    Platelet Morphology Normal Normal   POC Lactate    Specimen: Blood   Result Value Ref Range    Lactate 0.8 0.5 - 2.0 mmol/L   ECG 12 Lead   Result Value Ref Range    QT Interval 329 ms     CT Abdomen Pelvis With Contrast    Result Date: 6/2/2022  1.There is abnormal appearance of the majority of the small bowel and the cecum and ascending colon demonstrating low attenuation wall thickening likely representing submucosal edema, mucosal enhancement and mesenteric hyperemia with reactive stranding and fluid around the cecum. This appearance could be due to enteritis/colitis. This could be due to inflammatory bowel disease. This could be related to shock bowel in the setting of hypovolemic shock. Arterial flow and venous outflow from the bowel appears intact. 2.Small volume ascites, likely reactive.  Electronically Signed By-Sherif Giraldo MD On:6/2/2022 6:03 PM This report was finalized on [id number] by  Sherif Giraldo MD.    XR Chest 1 View    Result Date: 6/2/2022  1.An acute pulmonary process is not apparent.  Electronically Signed By-Duncan Atkins MD On:6/2/2022 4:27 PM This report was finalized on [id number] by  Duncan Atkins MD.    CT Angiogram Chest Pulmonary Embolism    Result Date: 6/2/2022  1. Pulmonary embolus within right lower lobe segmental branch pulmonary artery. No findings to indicate right heart strain. 2. Cardiomegaly. 3. No intrathoracic adenopathy. 4. Please see separately dictated abdominal CT for findings below  the diaphragm.  Electronically Signed By-Orlando Mckee MD On:6/2/2022 5:56 PM This report was finalized on [id number] by  Orlando Mckee MD.    Medications   sodium chloride 0.9 % flush 10 mL (has no administration in time range)   heparin 62385 units/250 mL (100 units/mL) in 0.45 % NaCl infusion (18 Units/kg/hr × 74.4 kg Intravenous New Bag 6/2/22 1906)   heparin bolus from bag 3,000 Units (has no administration in time range)   heparin bolus from bag 6,000 Units (has no administration in time range)   lactated ringers bolus 2,232 mL (0 mL/kg × 74.4 kg Intravenous Stopped 6/2/22 1652)   cefepime 2 gm IVPB in 100 ml NS (MBP) (0 g Intravenous Stopped 6/2/22 1733)   iopamidol (ISOVUE-370) 76 % injection 100 mL (100 mL Intravenous Given 6/2/22 1737)   lactated ringers bolus 1,000 mL (1,000 mL Intravenous New Bag 6/2/22 1903)   heparin bolus from bag 6,000 Units (6,000 Units Intravenous Bolus from Bag 6/2/22 1915)          EKG interpretation normal sinus rhythm rate of 110 no acute ST elevation nonspecific T wave flattening laterally aVL QTC of 444 is an abnormal EKG but really no significant change from previous other than faster rate                            SEPTIC SHOCK FOCUSED EXAM ATTESTATION    I attest that I have reassessed tissue perfusion after the fluid bolus given.    Rolando Yang MD  06/02/22  23:02 EDT             MDM  Number of Diagnoses or Management Options  Colitis: new and requires workup  COVID-19: new and requires workup  Enteritis: new and requires workup  Hypotension, unspecified hypotension type: new and requires workup  Single subsegmental pulmonary embolism without acute cor pulmonale (HCC): new and requires workup  Weakness: new and requires workup  Diagnosis management comments: Medical decision making.  Patient IV established placed on the monitor and had the above exam evaluation.  Sepsis protocol was started IV lactated Ringer's 30 mill per kilo and had the above evaluation EKG  obtained revealed a sinus rhythm with a heart rate about 110 no other acute ST elevation.  No significant changes from previous when compared patient had positive COVID-19 test he was also positive back in February.  Blood sugar was 129 patient had a BUN 26 creatinine 1.8 urine negative troponin negative dimer elevated over 35 TSH normal magnesium 1.8 CK 59 lactate was 0.8.  Cultures are pending patient had been placed on cefepime 2 g IV.  He underwent CT scans CT of the chest showed a pulmonary embolism in the right lower lobe segmental branch no findings of right heart strain cardiomegaly was noted no other acute abnormalities.  CT abdomen pelvis there is an abnormal appearance of majority of the small bowel and the cecum and ascending colon demonstrating low-attenuation wall thickening likely representing submucosal edema mucosal enhancement and mesenteric hyperemia with reactive stranding and fluid around the cecal this appearance could be due to enteritis or colitis could be inflammatory bowel disease it could be related to shock bowel in the setting of hypovolemic shock.  Arterial and venous outflow and arterial inflow appear to be intact small volume ascites.  The patient had been given additional liter lactated Ringer's.  He was started on weightbase heparin sats are good in the 94 to 93% range on room air respiratory rate of 18.  Lactate was normal the patient has been given fluids blood pressure is now 100/65 after some IV fluids.  He is awake and alert mentating well.  His lungs are clear his heart was regular without murmur and was soft nontender good bowel sounds.  He moves air without difficulties no rash no skin changes or cellulitic changes.  He is improved cultures are pending.  He was made aware of the findings I talked to Dr. Morales.  He will be admitted to the PCU for further care stable unremarkable improved ER course.  All labs EKG CTs reviewed by me.  CT is reviewed by radiology as well        Amount and/or Complexity of Data Reviewed  Clinical lab tests: reviewed  Tests in the radiology section of CPT®: reviewed  Discuss the patient with other providers: yes    Risk of Complications, Morbidity, and/or Mortality  Presenting problems: high  Diagnostic procedures: high  Management options: high    Patient Progress  Patient progress: stable      Final diagnoses:   Weakness   Hypotension, unspecified hypotension type   Single subsegmental pulmonary embolism without acute cor pulmonale (HCC)   Enteritis   Colitis   COVID-19       ED Disposition  ED Disposition     ED Disposition   Decision to Admit    Condition   --    Comment   Level of Care: Med/Surg [1]   Diagnosis: Pulmonary embolism (HCC) [330889]   Admitting Physician: MARYAM VALDOVINOS [[id number]]   Attending Physician: MARYAM VALDOVINOS [[id number]]   Certification: I certify that inpatient hospital services are medically necessary for greater than 2 midnights.               No follow-up provider specified.       Medication List      No changes were made to your prescriptions during this visit.          Rolando Yang MD  06/02/22 2304      Electronically signed by Rolando Yang MD at 06/02/22 2304     Trina Bennett RN at 06/02/22 2136        Assumed care of this patient from LIDIA Peña at 2100    Electronically signed by Trina Bennett RN at 06/02/22 2136       Vital Signs (last day)     Date/Time Temp Temp src Pulse Resp BP Patient Position SpO2    06/03/22 0945 98.2 (36.8) Oral 85 18 109/62 -- 97    06/03/22 0822 -- -- 85 16 -- -- --    06/03/22 0817 -- -- 86 16 -- -- 97    06/03/22 0545 -- -- 70 -- 92/55 -- 96    06/03/22 0524 97 (36.1) Oral 76 17 92/60 Lying 96    06/03/22 0255 -- -- 74 16 109/55 Lying 98    06/03/22 0131 -- -- 81 14 100/56 -- 95    06/03/22 0116 -- -- 77 14 102/57 -- 96    06/03/22 0046 -- -- 84 15 106/60 -- 96    06/03/22 0001 -- -- 87 15 98/60 -- 94    06/02/22 2331 -- -- 89 -- 102/60 -- 94    06/02/22 5936 -- -- 91 15 98/60 -- 93    06/02/22 2569  -- -- 93 15 100/65 -- 96    06/02/22 2116 -- -- 90 -- 96/61 -- --    06/02/22 1916 -- -- 93 -- 93/62 -- 94    06/02/22 1816 -- -- 102 -- 94/55 -- 96    06/02/22 1739 -- -- 102 -- 96/64 -- 90    06/02/22 1646 -- -- 97 -- 91/59 -- 98    06/02/22 1640 -- -- 96 -- 92/53 -- 98    06/02/22 1630 -- -- 100 -- 91/58 -- 97    06/02/22 1626 -- -- -- -- 90/58 -- --    06/02/22 1625 -- -- 99 -- -- -- 100    06/02/22 1621 -- -- 97 -- 97/58 -- 93    06/02/22 1616 -- -- 97 -- 102/60 -- 96    06/02/22 1614 97.6 (36.4) Oral -- -- -- -- --    06/02/22 1611 -- -- 100 -- 97/55 -- 97    06/02/22 1607 -- -- 100 -- 91/54 -- 98    06/02/22 1600 -- -- 102 -- 87/52 -- 97    06/02/22 1558 -- -- 108 -- 88/57 Sitting 99    06/02/22 1514 -- Oral 113 15 -- Sitting 93

## 2022-06-03 NOTE — PROGRESS NOTES
"Pharmacy Antimicrobial Dosing Service    Subjective:  Iván Fowler is a 71 y.o.male admitted with weakness. Pharmacy has been consulted to dose Vancomycin and Cefepime for possible sepsis.      Assessment/Plan    1. Day #2 Vancomycin: Pulse dosing d/t renal dysfxn. 1500mg (~20mg/kg ABW) IV x1 dose.  Random level ordered for 6/3 at 1200.  Re-dose when less than 20.  Consider q24h dose schedule.    2. Day #2 Cefepime: 2gm IV q12h for estCrCl 30-59 mL/min.    Will continue to monitor drug levels, renal function, culture and sensitivities, and patient clinical status.       Objective:  Relevant clinical data and objective history reviewed:  180.3 cm (71\")   74.4 kg (164 lb)   Ideal body weight: 75.3 kg (166 lb 0.1 oz)  Body mass index is 22.87 kg/m².        Results from last 7 days   Lab Units 06/02/22  1553   CREATININE mg/dL 1.84*     Estimated Creatinine Clearance: 39.9 mL/min (A) (by C-G formula based on SCr of 1.84 mg/dL (H)).  I/O last 3 completed shifts:  In: 2332 [IV Piggyback:2332]  Out: -     Results from last 7 days   Lab Units 06/02/22  1553   WBC 10*3/mm3 15.40*     Temperature    06/02/22 1614   Temp: 97.6 °F (36.4 °C)     Baseline culture/source/susceptibility:  Microbiology Results (last 10 days)       Procedure Component Value - Date/Time    COVID PRE-OP / PRE-PROCEDURE SCREENING ORDER (NO ISOLATION) - Swab, Nasopharynx [[id number]]  (Abnormal) Collected: 06/02/22 1712    Lab Status: Final result Specimen: Swab from Nasopharynx Updated: 06/02/22 1738    Narrative:      The following orders were created for panel order COVID PRE-OP / PRE-PROCEDURE SCREENING ORDER (NO ISOLATION) - Swab, Nasopharynx.  Procedure                               Abnormality         Status                     ---------                               -----------         ------                     COVID-19,CEPHEID/LUCIA,CO...[[id number]]  Abnormal            Final result                 Please view results for these tests on the " individual orders.    COVID-19,CEPHEID/LUCIA,COR/SANDIE/PAD/LYNNETTE IN-HOUSE(OR EMERGENT/ADD-ON),NP SWAB IN TRANSPORT MEDIA 3-4 HR TAT, RT-PCR - Swab, Nasopharynx [[id number]]  (Abnormal) Collected: 06/02/22 1712    Lab Status: Final result Specimen: Swab from Nasopharynx Updated: 06/02/22 1738     COVID19 Detected    Narrative:      Fact sheet for providers: https://www.fda.gov/media/656981/download     Fact sheet for patients: https://www.fda.gov/media/193370/download  Fact sheet for providers: https://www.fda.gov/media/994611/download    Fact sheet for patients: https://www.fda.gov/media/232374/download    Test performed by PCR.            Anti-Infectives (From admission, onward)      Ordered     Dose/Rate Route Frequency Start Stop    06/02/22 2351  cefepime 2 gm IVPB in 100 ml NS (MBP)        Ordering Provider: Rebeka Morales MD    2 g  over 4 Hours Intravenous Every 12 Hours 06/03/22 0500 06/10/22 0459    06/03/22 0159  Vancomycin Pharmacy Intermittent/Pulse Dosing        Ordering Provider: Rebeka Morales MD     Does not apply As Needed 06/03/22 0159 06/10/22 0158    06/02/22 2345  vancomycin 1500 mg/500 mL 0.9% NS IVPB ([facility])        Ordering Provider: Rebeka Morales MD    1,500 mg Intravenous Once 06/02/22 2347 06/03/22 0143    06/02/22 2332  Pharmacy to Dose Cefepime        Ordering Provider: Rebeka Morales MD     Does not apply Continuous PRN 06/02/22 2332 06/09/22 2331    06/02/22 2332  Pharmacy to dose vancomycin        Ordering Provider: Rebeka Morales MD     Does not apply Continuous PRN 06/02/22 2332 06/09/22 2331    06/02/22 1607  cefepime 2 gm IVPB in 100 ml NS (MBP)        Ordering Provider: Rolando Yang MD    2 g  over 30 Minutes Intravenous Once 06/02/22 1609 06/02/22 1733            Alirio Dorado  06/03/22 02:00 EDT

## 2022-06-03 NOTE — PLAN OF CARE
Goal Outcome Evaluation:  Plan of Care Reviewed With: patient        Progress: improving  Outcome Evaluation: Patient doing well today with no complaints. Heparin gtt infusing. Bp stable. Dopplers and xray done today. Will monitor.

## 2022-06-03 NOTE — PLAN OF CARE
Goal Outcome Evaluation:    Patient new to floor this shift from ED.  Patient remains on heparin gtt currently going at 15.  Patient had complaints of right knee pain that was alleviated with heat pack.  Patients wife remains at bedside.

## 2022-06-04 LAB
ANION GAP SERPL CALCULATED.3IONS-SCNC: 9 MMOL/L (ref 5–15)
APTT PPP: 66.3 SECONDS (ref 61–76.5)
APTT PPP: 69.8 SECONDS (ref 61–76.5)
APTT PPP: 77.2 SECONDS (ref 61–76.5)
BASOPHILS # BLD AUTO: 0 10*3/MM3 (ref 0–0.2)
BASOPHILS NFR BLD AUTO: 0.3 % (ref 0–1.5)
BUN SERPL-MCNC: 24 MG/DL (ref 8–23)
BUN/CREAT SERPL: 17.1 (ref 7–25)
CALCIUM SPEC-SCNC: 9 MG/DL (ref 8.6–10.5)
CHLORIDE SERPL-SCNC: 112 MMOL/L (ref 98–107)
CO2 SERPL-SCNC: 24 MMOL/L (ref 22–29)
CREAT SERPL-MCNC: 1.4 MG/DL (ref 0.76–1.27)
DEPRECATED RDW RBC AUTO: 49.9 FL (ref 37–54)
EGFRCR SERPLBLD CKD-EPI 2021: 54.4 ML/MIN/1.73
EOSINOPHIL # BLD AUTO: 0.1 10*3/MM3 (ref 0–0.4)
EOSINOPHIL NFR BLD AUTO: 2.9 % (ref 0.3–6.2)
ERYTHROCYTE [DISTWIDTH] IN BLOOD BY AUTOMATED COUNT: 17.8 % (ref 12.3–15.4)
GLUCOSE SERPL-MCNC: 105 MG/DL (ref 65–99)
HAPTOGLOB SERPL-MCNC: 317 MG/DL (ref 30–200)
HCT VFR BLD AUTO: 25.5 % (ref 37.5–51)
HGB BLD-MCNC: 8.2 G/DL (ref 13–17.7)
HOLD SPECIMEN: NORMAL
LYMPHOCYTES # BLD AUTO: 0.2 10*3/MM3 (ref 0.7–3.1)
LYMPHOCYTES NFR BLD AUTO: 3.9 % (ref 19.6–45.3)
MCH RBC QN AUTO: 25.5 PG (ref 26.6–33)
MCHC RBC AUTO-ENTMCNC: 32.3 G/DL (ref 31.5–35.7)
MCV RBC AUTO: 79 FL (ref 79–97)
MONOCYTES # BLD AUTO: 0.8 10*3/MM3 (ref 0.1–0.9)
MONOCYTES NFR BLD AUTO: 17.1 % (ref 5–12)
NEUTROPHILS NFR BLD AUTO: 3.7 10*3/MM3 (ref 1.7–7)
NEUTROPHILS NFR BLD AUTO: 75.8 % (ref 42.7–76)
NRBC BLD AUTO-RTO: 0.1 /100 WBC (ref 0–0.2)
PLATELET # BLD AUTO: 282 10*3/MM3 (ref 140–450)
PMV BLD AUTO: 7.4 FL (ref 6–12)
POTASSIUM SERPL-SCNC: 3.9 MMOL/L (ref 3.5–5.2)
RBC # BLD AUTO: 3.23 10*6/MM3 (ref 4.14–5.8)
SODIUM SERPL-SCNC: 145 MMOL/L (ref 136–145)
WBC NRBC COR # BLD: 4.9 10*3/MM3 (ref 3.4–10.8)

## 2022-06-04 PROCEDURE — 36415 COLL VENOUS BLD VENIPUNCTURE: CPT | Performed by: INTERNAL MEDICINE

## 2022-06-04 PROCEDURE — 80048 BASIC METABOLIC PNL TOTAL CA: CPT | Performed by: INTERNAL MEDICINE

## 2022-06-04 PROCEDURE — 85730 THROMBOPLASTIN TIME PARTIAL: CPT | Performed by: INTERNAL MEDICINE

## 2022-06-04 PROCEDURE — 94799 UNLISTED PULMONARY SVC/PX: CPT

## 2022-06-04 PROCEDURE — 25010000002 NA FERRIC GLUC CPLX PER 12.5 MG: Performed by: NURSE PRACTITIONER

## 2022-06-04 PROCEDURE — 25010000002 HEPARIN (PORCINE) 25000-0.45 UT/250ML-% SOLUTION: Performed by: INTERNAL MEDICINE

## 2022-06-04 PROCEDURE — 25010000002 CEFEPIME PER 500 MG: Performed by: INTERNAL MEDICINE

## 2022-06-04 PROCEDURE — 83010 ASSAY OF HAPTOGLOBIN QUANT: CPT | Performed by: NURSE PRACTITIONER

## 2022-06-04 PROCEDURE — 85025 COMPLETE CBC W/AUTO DIFF WBC: CPT | Performed by: INTERNAL MEDICINE

## 2022-06-04 RX ADMIN — FINASTERIDE 5 MG: 5 TABLET, FILM COATED ORAL at 21:15

## 2022-06-04 RX ADMIN — ALBUTEROL SULFATE 2 PUFF: 108 INHALANT RESPIRATORY (INHALATION) at 12:02

## 2022-06-04 RX ADMIN — CEFEPIME HYDROCHLORIDE 2 G: 2 INJECTION, POWDER, FOR SOLUTION INTRAVENOUS at 16:12

## 2022-06-04 RX ADMIN — FAMOTIDINE 40 MG: 20 TABLET ORAL at 08:37

## 2022-06-04 RX ADMIN — Medication 3 ML: at 08:38

## 2022-06-04 RX ADMIN — Medication 3 ML: at 21:15

## 2022-06-04 RX ADMIN — FERROUS SULFATE TAB EC 324 MG (65 MG FE EQUIVALENT) 324 MG: 324 (65 FE) TABLET DELAYED RESPONSE at 08:38

## 2022-06-04 RX ADMIN — SODIUM CHLORIDE 250 MG: 9 INJECTION, SOLUTION INTRAVENOUS at 12:46

## 2022-06-04 RX ADMIN — ALBUTEROL SULFATE 2 PUFF: 108 INHALANT RESPIRATORY (INHALATION) at 07:49

## 2022-06-04 RX ADMIN — SODIUM CHLORIDE 75 ML/HR: 9 INJECTION, SOLUTION INTRAVENOUS at 04:44

## 2022-06-04 RX ADMIN — ALBUTEROL SULFATE 2 PUFF: 108 INHALANT RESPIRATORY (INHALATION) at 19:45

## 2022-06-04 RX ADMIN — HEPARIN SODIUM 15.1 UNITS/KG/HR: 10000 INJECTION, SOLUTION INTRAVENOUS at 04:45

## 2022-06-04 RX ADMIN — CEFEPIME HYDROCHLORIDE 2 G: 2 INJECTION, POWDER, FOR SOLUTION INTRAVENOUS at 04:46

## 2022-06-04 RX ADMIN — ALBUTEROL SULFATE 2 PUFF: 108 INHALANT RESPIRATORY (INHALATION) at 16:32

## 2022-06-04 NOTE — PROGRESS NOTES
Hematology/Oncology Inpatient Progress Note    PATIENT NAME: Iván Fowler  : 1950  MRN: [id number]    CHIEF COMPLAINT: Right lung PE, follicular non-Hodgkin's lymphoma stage III, vitamin B12 and iron deficiency anemia    HISTORY OF PRESENT ILLNESS:  71 y.o. male admitted to to [facility] through the ED on 2022 after presenting with weakness and lightheadedness with hypotension leading to a fall with pain and edema in the right knee.  PMH is significant for NHL on maintenance rituximab and history of hospitalization in 2022 with COVID-19 pneumonia at which time he received remdesivir and dexamethasone.  He denied any fevers, chills, cough, chest pain, night sweats, nausea.  He reported 1 episode of diarrhea in the morning of admission.  CXR was negative for acute findings.  CBC revealed WBC 15.4, hemoglobin 12.1, MCV 80, RDW 17.8, platelets 461,000.  CMP revealed creatinine elevated to 1.84 with BUN 26 and LFTs were not elevated.  Coags were okay.  D-dimer was high at 35.2 (0-0.59).  Troponin was normal.  COVID-19 screen was positive.  CT PE protocol was performed secondary to high D-dimer and revealed right lower lobe PE without evidence of heart strain.  There was no lymphadenopathy, mass, or pneumonia present.  CT abdomen and pelvis showed a small amount of ascites and diffuse wall thickening throughout the small bowel with mucosal hyperenhancement and mesenteric hyperemia.  It was felt this could possibly relate to enteritis/colitis versus inflammatory bowel disease or shock bowel with hypovolemic shock.  Arterial and venous flow from the bowel appeared intact.  There was no lymphadenopathy.  He was started on heparin drip and IV antibiotics. At time of consultation 6/3/2022 hemoglobin had dropped to 9.0.     22  Hematology/Oncology was consulted as the patient is known to our service and followed for follicular NHL stage III diagnosed 2020, vitamin B12  deficiency and iron deficiency anemia diagnosed in April 2022 and hypercalcemia diagnosed March 2022.  He received Bendamustine with rituximab for his lymphoma with complete response and is now on rituximab maintenance.  He received cycle 6 rituximab on 5/19/2022.  Vitamin B12 deficiency has been treated with vitamin B12 injections.  He has 3 weekly injections remaining with the most recent dose given 6/2/2022.  Iron studies in April 2022 had iron sat of 9% and ferritin 356.  Folate level was normal and there was no hemolysis.  He has been on ferrous sulfate 325 mg by mouth daily.  In March 2022 serum calcium was mildly elevated at 10.5 with ionized calcium, vitamin D, and PTH all normal.  He was last seen as an outpatient on 5/3/2022 where CBC revealed WBC 5.29, hemoglobin 8.46, and platelets 396,500.  Pernicious anemia work-up was negative.  Stool Hemoccult was negative.  ACE was 40 (9-67).  Additional hypercalcemia work-up revealed vitamin A 48.7 (30-75) and aluminum was 4 (less than 9).  Serum calcium at that visit had normalized at 8.7.     PCP: Mat Aiken MD     INTERVAL HISTORY:  • 6/4/2022- white count 4.9 hemoglobin 8.2, MCV 79 and platelets 282.  Creatinine 1.4.  X-ray right knee unremarkable.  Bilateral lower extremity venous Dopplers negative for DVT.  Iron 42 (), iron saturation 11 (20-50), TIBC 367 (298-536), ferritin 283.6 ().  Initiated Ferrlecit 250 mg IV daily x4 4 iron deficit.    Subjective   Denies any complaints except for right knee pain.  He has not been out of bed to see if he is steady on his feet.    ROS:  Review of Systems   Constitutional: Negative for activity change, chills, fatigue, fever and unexpected weight change.   HENT: Negative for congestion, dental problem, hearing loss, mouth sores, nosebleeds, sore throat and trouble swallowing.    Eyes: Negative for photophobia and visual disturbance.   Respiratory: Negative for cough, chest tightness and shortness of  "breath.    Cardiovascular: Negative for chest pain, palpitations and leg swelling.   Gastrointestinal: Negative for abdominal distention, abdominal pain, blood in stool, constipation, diarrhea, nausea and vomiting.   Endocrine: Negative for cold intolerance and heat intolerance.   Genitourinary: Negative for decreased urine volume, difficulty urinating, dysuria, frequency, hematuria and urgency.   Musculoskeletal: Positive for arthralgias (Right knee pain.). Negative for gait problem.   Skin: Negative for rash and wound.   Neurological: Negative for dizziness, tremors, weakness, light-headedness, numbness and headaches.   Hematological: Negative for adenopathy. Does not bruise/bleed easily.   Psychiatric/Behavioral: Negative for confusion and hallucinations. The patient is not nervous/anxious.    All other systems reviewed and are negative.       MEDICATIONS:    Scheduled Meds:  albuterol sulfate HFA, 2 puff, Inhalation, 4x Daily - RT  cefepime, 2 g, Intravenous, Q12H  famotidine, 40 mg, Oral, Daily  ferrous sulfate, 324 mg, Oral, Daily With Breakfast  finasteride, 5 mg, Oral, Nightly  sodium chloride, 3 mL, Intravenous, Q12H       Continuous Infusions:  heparin, 18 Units/kg/hr, Last Rate: 15.1 Units/kg/hr (06/04/22 0445)  Pharmacy to Dose Cefepime,   sodium chloride, 75 mL/hr, Last Rate: 75 mL/hr (06/04/22 0444)       PRN Meds:  •  acetaminophen  •  heparin  •  heparin  •  nitroglycerin  •  ondansetron  •  Pharmacy to Dose Cefepime  •  potassium chloride  •  potassium chloride  •  [COMPLETED] Insert peripheral IV **AND** sodium chloride  •  sodium chloride     ALLERGIES:  No Known Allergies    Objective    VITALS:   /74 (BP Location: Left arm, Patient Position: Lying)   Pulse 88   Temp 97.8 °F (36.6 °C) (Oral)   Resp 17   Ht 180.3 cm (71\")   Wt 74.6 kg (164 lb 7.4 oz)   SpO2 97%   BMI 22.94 kg/m²     PHYSICAL EXAM:  Physical Exam  Vitals and nursing note reviewed.   Constitutional:       General: He is " not in acute distress.     Appearance: Normal appearance. He is well-developed. He is not diaphoretic.   HENT:      Head: Normocephalic and atraumatic.      Comments: Male pattern baldness.     Right Ear: External ear normal.      Left Ear: External ear normal.      Nose: Nose normal.      Mouth/Throat:      Mouth: Mucous membranes are moist.      Pharynx: Oropharynx is clear. No oropharyngeal exudate or posterior oropharyngeal erythema.   Eyes:      General: No scleral icterus.     Extraocular Movements: Extraocular movements intact.      Conjunctiva/sclera: Conjunctivae normal.      Pupils: Pupils are equal, round, and reactive to light.      Comments: Eyeglasses.   Cardiovascular:      Rate and Rhythm: Normal rate and regular rhythm.      Heart sounds: Normal heart sounds. No murmur heard.     Comments: Cardiac monitor leads.  Pulmonary:      Effort: Pulmonary effort is normal. No respiratory distress.      Breath sounds: Normal breath sounds. No wheezing or rales.      Comments: Right chest wall port.  Chest:   Breasts:      Right: No supraclavicular adenopathy.      Left: No supraclavicular adenopathy.       Abdominal:      General: Bowel sounds are normal. There is no distension.      Palpations: Abdomen is soft. There is no mass.      Tenderness: There is no abdominal tenderness. There is no guarding.   Genitourinary:     Comments: Deferred   Musculoskeletal:         General: No swelling, tenderness or deformity. Normal range of motion.      Cervical back: Normal range of motion and neck supple.      Right lower leg: No edema.      Left lower leg: No edema.      Comments: Bandage on left upper extremity.   Lymphadenopathy:      Cervical: No cervical adenopathy.      Upper Body:      Right upper body: No supraclavicular adenopathy.      Left upper body: No supraclavicular adenopathy.   Skin:     General: Skin is warm and dry.      Coloration: Skin is not pale.      Findings: Bruising (Few small ecchymosis on  arms.) present. No erythema or rash.   Neurological:      General: No focal deficit present.      Mental Status: He is alert and oriented to person, place, and time.      Coordination: Coordination normal.   Psychiatric:         Mood and Affect: Mood normal.         Behavior: Behavior normal.         Thought Content: Thought content normal.         Judgment: Judgment normal.           RECENT LABS:  Lab Results (last 24 hours)     Procedure Component Value Units Date/Time    aPTT [[id number]]  (Normal) Collected: 06/04/22 0730    Specimen: Blood Updated: 06/04/22 0802     PTT 69.8 seconds     aPTT [[id number]]  (Abnormal) Collected: 06/04/22 0009    Specimen: Blood Updated: 06/04/22 0046     PTT 77.2 seconds     Basic Metabolic Panel [[id number]]  (Abnormal) Collected: 06/04/22 0009    Specimen: Blood Updated: 06/04/22 0044     Glucose 105 mg/dL      BUN 24 mg/dL      Creatinine 1.40 mg/dL      Sodium 145 mmol/L      Potassium 3.9 mmol/L      Chloride 112 mmol/L      CO2 24.0 mmol/L      Calcium 9.0 mg/dL      BUN/Creatinine Ratio 17.1     Anion Gap 9.0 mmol/L      eGFR 54.4 mL/min/1.73      Comment: National Kidney Foundation and American Society of Nephrology (ASN) Task Force recommended calculation based on the Chronic Kidney Disease Epidemiology Collaboration (CKD-EPI) equation refit without adjustment for race.       Narrative:      GFR Normal >60  Chronic Kidney Disease <60  Kidney Failure <15      CBC & Differential [[id number]]  (Abnormal) Collected: 06/04/22 0009    Specimen: Blood Updated: 06/04/22 0039    Narrative:      The following orders were created for panel order CBC & Differential.  Procedure                               Abnormality         Status                     ---------                               -----------         ------                     CBC Auto Differential[[id number]]        Abnormal            Final result               Scan Slide[[id number]]                                                                     Please view results for these tests on the individual orders.    CBC Auto Differential [[id number]]  (Abnormal) Collected: 06/04/22 0009    Specimen: Blood Updated: 06/04/22 0039     WBC 4.90 10*3/mm3      RBC 3.23 10*6/mm3      Hemoglobin 8.2 g/dL      Hematocrit 25.5 %      MCV 79.0 fL      MCH 25.5 pg      MCHC 32.3 g/dL      RDW 17.8 %      RDW-SD 49.9 fl      MPV 7.4 fL      Platelets 282 10*3/mm3      Neutrophil % 75.8 %      Lymphocyte % 3.9 %      Monocyte % 17.1 %      Eosinophil % 2.9 %      Basophil % 0.3 %      Neutrophils, Absolute 3.70 10*3/mm3      Lymphocytes, Absolute 0.20 10*3/mm3      Monocytes, Absolute 0.80 10*3/mm3      Eosinophils, Absolute 0.10 10*3/mm3      Basophils, Absolute 0.00 10*3/mm3      nRBC 0.1 /100 WBC     aPTT [[id number]]  (Abnormal) Collected: 06/03/22 1533    Specimen: Blood Updated: 06/03/22 2042     PTT >200.0 seconds     aPTT [[id number]]  (Abnormal) Collected: 06/03/22 1736    Specimen: Blood Updated: 06/03/22 1802     PTT 57.8 seconds      Comment: Result checked        Blood Culture - Blood, Blood, Venous Line [[id number]]  (Normal) Collected: 06/02/22 1650    Specimen: Blood, Venous Line Updated: 06/03/22 1702     Blood Culture No growth at 24 hours    Blood Culture - Blood, Blood, Venous Line [[id number]]  (Normal) Collected: 06/02/22 1553    Specimen: Blood, Venous Line Updated: 06/03/22 1604     Blood Culture No growth at 24 hours    Ferritin [[id number]]  (Normal) Collected: 06/03/22 0331    Specimen: Blood Updated: 06/03/22 1141     Ferritin 283.60 ng/mL     Narrative:      Results may be falsely decreased if patient taking Biotin.      Iron Profile [[id number]]  (Abnormal) Collected: 06/03/22 0331    Specimen: Blood Updated: 06/03/22 1140     Iron 42 mcg/dL      Iron Saturation 11 %      Transferrin 246 mg/dL      TIBC 367 mcg/dL           PENDING RESULTS: SPEP, hapto    IMAGING REVIEWED:  XR Knee 1 or 2 View Right    Result Date:  6/3/2022  Negative for acute osseous abnormality.  Small joint effusion.  Electronically Signed By-Orlando Mckee MD On:6/3/2022 1:18 PM This report was finalized on [id number] by  Orlando Mckee MD.    CT Abdomen Pelvis With Contrast    Result Date: 6/2/2022  1.There is abnormal appearance of the majority of the small bowel and the cecum and ascending colon demonstrating low attenuation wall thickening likely representing submucosal edema, mucosal enhancement and mesenteric hyperemia with reactive stranding and fluid around the cecum. This appearance could be due to enteritis/colitis. This could be due to inflammatory bowel disease. This could be related to shock bowel in the setting of hypovolemic shock. Arterial flow and venous outflow from the bowel appears intact. 2.Small volume ascites, likely reactive.  Electronically Signed By-Sherif Giraldo MD On:6/2/2022 6:03 PM This report was finalized on [id number] by  Sherif Giraldo MD.    XR Chest 1 View    Result Date: 6/2/2022  1.An acute pulmonary process is not apparent.  Electronically Signed By-Duncan Atkins MD On:6/2/2022 4:27 PM This report was finalized on [id number] by  Duncan Atkins MD.    CT Angiogram Chest Pulmonary Embolism    Result Date: 6/2/2022  1. Pulmonary embolus within right lower lobe segmental branch pulmonary artery. No findings to indicate right heart strain. 2. Cardiomegaly. 3. No intrathoracic adenopathy. 4. Please see separately dictated abdominal CT for findings below the diaphragm.  Electronically Signed By-Orlando Mckee MD On:6/2/2022 5:56 PM This report was finalized on [id number] by  Orlando Mckee MD.      I have reviewed the patient's labs, imaging, reports, and other clinician documentation.    Assessment & Plan   ASSESSMENT:  1. Acute right lower lobe PE-likely provoked by recurrent or persistent COVID-19 infection and no indication for thrombophilia work-up at present.  D-dimer is high.  On heparin drip.  BLE venous  Dopplers negative for DVT.  Plan to transition to Eliquis on discharge.  2. Stage III follicular NHL- in complete remission and on maintenance rituximab, last dosed 5/19 as outpatient. Current CTs show continued complete remission and rituximab suppresses B-cell function long-term causing immune compromise.  Therefore it may be prudent to consider stopping maintenance therapy early due to recurrent infections.  3. Anemia/iron deficiency anemia/vitamin B12 deficiency- he has 3 remaining weekly vitamin B12 injections with the next due 6/9.  Continue daily oral iron and treat iron deficit with Ferrlecit 250 mg IV x4.  Stool heme in past has been negative.  On Pepcid. If he remains anemic after iron and B12 replacement, he may need GI work-up performed prior to treatment for anemia secondary to CKD.  4. Recurrent COVID-19 infection-hospitalized February 2022 with COVID-19 PNA.  No PNA on CT or CXR.  Has had recent diarrhea.  No fever.    Stable.  Per primary team.  5. Hypotension- resolved.    6. Enteritis/colitis versus shock bowel-he did have some diarrhea the a.m. of admission and it has now resolved.  On cefepime.  Per primary team.  7. CKD stage III-managed by Dr. Castillo as outpatient.  Stable.        PLAN  1. We will follow CBC and transfuse as needed hemoglobin less than 7.    2. Continue daily ferrous sulfate and weekly vitamin B12 injections (due 6/9).  3. Continue heparin drip.  4. Case management to evaluate insurance coverage for Eliquis.  5. Consider stopping rituximab maintenance.  6. Haptoglobin.  7. Ferrlecit 250 mg IV x4.  8. We will plan for outpatient GI work-up.    Electronically signed by RAJNI Beavers, 06/04/22, 10:58 AM EDT.             I have personally performed a face-to-face diagnostic evaluation on this patient via telehealth.  I have discussed the case with Blanca Delgado NP, have edited/reviewed the note, and agree with the care plan.  The patient is feeling all right except for  her knee pain.  On examination, he has mid back involvement and ecchymoses upper extremities.  Labs are significant for anemia and low iron saturation.  He is on treatment for PE with IV heparin and can be switched to DOAC's on discharge.  Will treat anemia with IV iron.          I discussed the patients findings and my recommendations with patient.    Electronically signed by Madi Pringle MD, 06/04/22, 2:45 PM EDT.

## 2022-06-04 NOTE — PLAN OF CARE
Goal Outcome Evaluation:    Patient has been resting with wife at bedside this shift.  Patient remains on heparin gtt at this time.  VSS at this time.

## 2022-06-04 NOTE — PLAN OF CARE
Problem: Adult Inpatient Plan of Care  Goal: Plan of Care Review  Outcome: Ongoing, Progressing  Flowsheets (Taken 6/4/2022 1434)  Outcome Evaluation:   Pt co   nt on heparin.  theraputic at present x 2 labs.  will cont ptt's in the aml.  pt at 15.1..ns at 75. Pt vss will cont to monitor.  wife at bedside.   Goal Outcome Evaluation:              Outcome Evaluation: Pt co;nt on heparin.  theraputic at present x 2 labs.  will cont ptt's in the aml.  pt at 15.1..ns at 75. Pt vss will cont to monitor.  wife at bedside.

## 2022-06-04 NOTE — PROGRESS NOTES
LOS: 2 days   Patient Care Team:  Mat Aiken MD as PCP - General (Family Medicine)  Madi Pringle MD as Consulting Physician (Hematology and Oncology)    Subjective     Interval History: Overall feels better - no further episodes of dizziness or lightheaded, but has not been out of bed.    Patient Complaints: Right knee pain is improving.  No chest pain or SOA.  No URI symptoms.    History taken from: patient    Review of Systems   Constitutional: Positive for activity change. Negative for appetite change, chills, diaphoresis, fatigue and fever.   HENT: Negative for facial swelling.    Eyes: Negative for visual disturbance.   Respiratory: Negative for cough, shortness of breath, wheezing and stridor.    Cardiovascular: Negative for chest pain, palpitations and leg swelling.   Gastrointestinal: Negative for abdominal pain, constipation, diarrhea, nausea and vomiting.   Genitourinary: Negative for hematuria.   Musculoskeletal: Positive for arthralgias, gait problem and joint swelling.   Skin: Negative for pallor, rash and wound.   Neurological: Negative for weakness.   Psychiatric/Behavioral: Negative for confusion.           Objective     Vital Signs  Temp:  [97.7 °F (36.5 °C)-98.2 °F (36.8 °C)] 98.1 °F (36.7 °C)  Heart Rate:  [] 95  Resp:  [16-20] 16  BP: (119-137)/(65-76) 135/71    Physical Exam:     General Appearance:    Alert, cooperative, in no acute distress,   Head:    Normocephalic, without obvious abnormality, atraumatic   Eyes:            Lids and lashes normal, conjunctivae and sclerae normal, no   icterus, no pallor, corneas clear, PERRLA   Ears:    Ears appear intact with no abnormalities noted   Throat:   No oral lesions, no thrush, oral mucosa moist   Neck:   No adenopathy, supple, trachea midline, no thyromegaly, no   carotid bruit, no JVD   Lungs:     Clear to auscultation,respirations regular, even and                  unlabored    Heart:    Regular rhythm and normal rate,  normal S1 and S2, no            murmur, no gallop, no rub, no click   Chest Wall:    No abnormalities observed   Abdomen:     Normal bowel sounds, no masses, no organomegaly, soft        Non-tender non-distended, no guarding,   Extremities:   Right knee - STS medial aspect; no ligament instability   Pulses:   Pulses palpable and equal bilaterally   Skin:   No bleeding, bruising or rash   Lymph nodes:   No palpable adenopathy   Neurologic:   Cranial nerves 2 - 12 grossly intact, sensation intact, DTR       present and equal bilaterally        Results Review:    Lab Results (last 24 hours)     Procedure Component Value Units Date/Time    Extra Tubes [[id number]] Collected: 06/04/22 1309    Specimen: Blood, Venous Line Updated: 06/04/22 1417    Narrative:      The following orders were created for panel order Extra Tubes.  Procedure                               Abnormality         Status                     ---------                               -----------         ------                     Green Top (Gel)[[id number]]                                  Final result                 Please view results for these tests on the individual orders.    Green Top (Gel) [[id number]] Collected: 06/04/22 1309    Specimen: Blood Updated: 06/04/22 1417     Extra Tube Hold for add-ons.     Comment: Auto resulted.       aPTT [[id number]]  (Normal) Collected: 06/04/22 1309    Specimen: Blood Updated: 06/04/22 1331     PTT 66.3 seconds     Haptoglobin [[id number]] Collected: 06/04/22 1100    Specimen: Blood Updated: 06/04/22 1109    aPTT [[id number]]  (Normal) Collected: 06/04/22 0730    Specimen: Blood Updated: 06/04/22 0802     PTT 69.8 seconds     aPTT [[id number]]  (Abnormal) Collected: 06/04/22 0009    Specimen: Blood Updated: 06/04/22 0046     PTT 77.2 seconds     Basic Metabolic Panel [[id number]]  (Abnormal) Collected: 06/04/22 0009    Specimen: Blood Updated: 06/04/22 0044     Glucose 105 mg/dL      BUN 24 mg/dL      Creatinine 1.40  mg/dL      Sodium 145 mmol/L      Potassium 3.9 mmol/L      Chloride 112 mmol/L      CO2 24.0 mmol/L      Calcium 9.0 mg/dL      BUN/Creatinine Ratio 17.1     Anion Gap 9.0 mmol/L      eGFR 54.4 mL/min/1.73      Comment: National Kidney Foundation and American Society of Nephrology (ASN) Task Force recommended calculation based on the Chronic Kidney Disease Epidemiology Collaboration (CKD-EPI) equation refit without adjustment for race.       Narrative:      GFR Normal >60  Chronic Kidney Disease <60  Kidney Failure <15      CBC & Differential [[id number]]  (Abnormal) Collected: 06/04/22 0009    Specimen: Blood Updated: 06/04/22 0039    Narrative:      The following orders were created for panel order CBC & Differential.  Procedure                               Abnormality         Status                     ---------                               -----------         ------                     CBC Auto Differential[[id number]]        Abnormal            Final result               Scan Slide[[id number]]                                                                    Please view results for these tests on the individual orders.    CBC Auto Differential [[id number]]  (Abnormal) Collected: 06/04/22 0009    Specimen: Blood Updated: 06/04/22 0039     WBC 4.90 10*3/mm3      RBC 3.23 10*6/mm3      Hemoglobin 8.2 g/dL      Hematocrit 25.5 %      MCV 79.0 fL      MCH 25.5 pg      MCHC 32.3 g/dL      RDW 17.8 %      RDW-SD 49.9 fl      MPV 7.4 fL      Platelets 282 10*3/mm3      Neutrophil % 75.8 %      Lymphocyte % 3.9 %      Monocyte % 17.1 %      Eosinophil % 2.9 %      Basophil % 0.3 %      Neutrophils, Absolute 3.70 10*3/mm3      Lymphocytes, Absolute 0.20 10*3/mm3      Monocytes, Absolute 0.80 10*3/mm3      Eosinophils, Absolute 0.10 10*3/mm3      Basophils, Absolute 0.00 10*3/mm3      nRBC 0.1 /100 WBC     aPTT [[id number]]  (Abnormal) Collected: 06/03/22 1533    Specimen: Blood Updated: 06/03/22 2042     PTT >200.0  seconds     aPTT [[id number]]  (Abnormal) Collected: 06/03/22 1736    Specimen: Blood Updated: 06/03/22 1802     PTT 57.8 seconds      Comment: Result checked        Blood Culture - Blood, Blood, Venous Line [[id number]]  (Normal) Collected: 06/02/22 1650    Specimen: Blood, Venous Line Updated: 06/03/22 1702     Blood Culture No growth at 24 hours    Blood Culture - Blood, Blood, Venous Line [[id number]]  (Normal) Collected: 06/02/22 1553    Specimen: Blood, Venous Line Updated: 06/03/22 1604     Blood Culture No growth at 24 hours           Imaging Results (Last 24 Hours)     ** No results found for the last 24 hours. **               I reviewed the patient's new clinical results.    Medication Review:   Scheduled Meds:albuterol sulfate HFA, 2 puff, Inhalation, 4x Daily - RT  cefepime, 2 g, Intravenous, Q12H  famotidine, 40 mg, Oral, Daily  ferric gluconate, 250 mg, Intravenous, Daily  ferrous sulfate, 324 mg, Oral, Daily With Breakfast  finasteride, 5 mg, Oral, Nightly  sodium chloride, 3 mL, Intravenous, Q12H      Continuous Infusions:heparin, 18 Units/kg/hr, Last Rate: 15.1 Units/kg/hr (06/04/22 0445)  Pharmacy to Dose Cefepime,   sodium chloride, 75 mL/hr, Last Rate: 75 mL/hr (06/04/22 0444)      PRN Meds:.•  acetaminophen  •  heparin  •  heparin  •  nitroglycerin  •  ondansetron  •  Pharmacy to Dose Cefepime  •  potassium chloride  •  potassium chloride  •  [COMPLETED] Insert peripheral IV **AND** sodium chloride  •  sodium chloride     Assessment & Plan       Pulmonary embolism (HCC)  - currently on heparin drip; LE venous dopplers were negative; initiate oral anticoagulation once ok with hematology.  May be related to recent/active (?) COVID infection.    Follicular lymphoma - maintenance chemotherapy per Dr. Pringle.    Right knee pain - suspect strain as symptoms are improving; apply ice tid; PT eval.  MRI if symptoms don't improve with conservative measures    COVID - unclear if current positive test is  residual from February or represents recurrent infection - he is asymptomatic.  Continue isolation.    ROHINI - improving with hydration; avoid nephrotoxins    Anemia - iron and b12 deficient - per hematology.    Colitis - asymptomatic; findings on ct likely related to hypotension.  Continue cefepime until blood cultues are negative.    Plan for disposition:Home at discharge.    Rebeka Morlaes MD  06/04/22  15:11 EDT

## 2022-06-05 ENCOUNTER — READMISSION MANAGEMENT (OUTPATIENT)
Dept: CALL CENTER | Facility: HOSPITAL | Age: 72
End: 2022-06-05

## 2022-06-05 VITALS
TEMPERATURE: 98.2 F | SYSTOLIC BLOOD PRESSURE: 156 MMHG | OXYGEN SATURATION: 99 % | RESPIRATION RATE: 16 BRPM | DIASTOLIC BLOOD PRESSURE: 73 MMHG | WEIGHT: 164.46 LBS | HEART RATE: 87 BPM | BODY MASS INDEX: 23.02 KG/M2 | HEIGHT: 71 IN

## 2022-06-05 PROBLEM — U07.1 COVID-19: Status: ACTIVE | Noted: 2022-06-05

## 2022-06-05 PROBLEM — M25.561 RIGHT KNEE PAIN: Status: ACTIVE | Noted: 2022-06-05

## 2022-06-05 LAB
ANION GAP SERPL CALCULATED.3IONS-SCNC: 10 MMOL/L (ref 5–15)
ANISOCYTOSIS BLD QL: ABNORMAL
APTT PPP: 44.9 SECONDS (ref 61–76.5)
APTT PPP: >139 SECONDS (ref 61–76.5)
BUN SERPL-MCNC: 17 MG/DL (ref 8–23)
BUN/CREAT SERPL: 17 (ref 7–25)
CALCIUM SPEC-SCNC: 8.9 MG/DL (ref 8.6–10.5)
CHLORIDE SERPL-SCNC: 111 MMOL/L (ref 98–107)
CO2 SERPL-SCNC: 24 MMOL/L (ref 22–29)
CREAT SERPL-MCNC: 1 MG/DL (ref 0.76–1.27)
DEPRECATED RDW RBC AUTO: 50.3 FL (ref 37–54)
EGFRCR SERPLBLD CKD-EPI 2021: 81.5 ML/MIN/1.73
EOSINOPHIL # BLD MANUAL: 0.19 10*3/MM3 (ref 0–0.4)
EOSINOPHIL NFR BLD MANUAL: 4 % (ref 0.3–6.2)
ERYTHROCYTE [DISTWIDTH] IN BLOOD BY AUTOMATED COUNT: 17.7 % (ref 12.3–15.4)
GLUCOSE SERPL-MCNC: 92 MG/DL (ref 65–99)
HCT VFR BLD AUTO: 23.9 % (ref 37.5–51)
HGB BLD-MCNC: 7.8 G/DL (ref 13–17.7)
LARGE PLATELETS: ABNORMAL
LYMPHOCYTES # BLD MANUAL: 0 10*3/MM3 (ref 0.7–3.1)
LYMPHOCYTES NFR BLD MANUAL: 12 % (ref 5–12)
MCH RBC QN AUTO: 25.9 PG (ref 26.6–33)
MCHC RBC AUTO-ENTMCNC: 32.5 G/DL (ref 31.5–35.7)
MCV RBC AUTO: 79.6 FL (ref 79–97)
METAMYELOCYTES NFR BLD MANUAL: 1 % (ref 0–0)
MONOCYTES # BLD: 0.58 10*3/MM3 (ref 0.1–0.9)
NEUTROPHILS # BLD AUTO: 3.98 10*3/MM3 (ref 1.7–7)
NEUTROPHILS NFR BLD MANUAL: 73 % (ref 42.7–76)
NEUTS BAND NFR BLD MANUAL: 10 % (ref 0–5)
PLATELET # BLD AUTO: 233 10*3/MM3 (ref 140–450)
PMV BLD AUTO: 7.4 FL (ref 6–12)
POTASSIUM SERPL-SCNC: 3.7 MMOL/L (ref 3.5–5.2)
QT INTERVAL: 329 MS
RBC # BLD AUTO: 3 10*6/MM3 (ref 4.14–5.8)
SCAN SLIDE: NORMAL
SODIUM SERPL-SCNC: 145 MMOL/L (ref 136–145)
VARIANT LYMPHS NFR BLD MANUAL: 0 % (ref 19.6–45.3)
WBC MORPH BLD: NORMAL
WBC NRBC COR # BLD: 4.8 10*3/MM3 (ref 3.4–10.8)

## 2022-06-05 PROCEDURE — 94799 UNLISTED PULMONARY SVC/PX: CPT

## 2022-06-05 PROCEDURE — 25010000002 NA FERRIC GLUC CPLX PER 12.5 MG: Performed by: NURSE PRACTITIONER

## 2022-06-05 PROCEDURE — 85730 THROMBOPLASTIN TIME PARTIAL: CPT | Performed by: INTERNAL MEDICINE

## 2022-06-05 PROCEDURE — 36415 COLL VENOUS BLD VENIPUNCTURE: CPT | Performed by: INTERNAL MEDICINE

## 2022-06-05 PROCEDURE — 85025 COMPLETE CBC W/AUTO DIFF WBC: CPT | Performed by: INTERNAL MEDICINE

## 2022-06-05 PROCEDURE — 97162 PT EVAL MOD COMPLEX 30 MIN: CPT

## 2022-06-05 PROCEDURE — 25010000002 HEPARIN (PORCINE) 25000-0.45 UT/250ML-% SOLUTION: Performed by: INTERNAL MEDICINE

## 2022-06-05 PROCEDURE — 85007 BL SMEAR W/DIFF WBC COUNT: CPT | Performed by: INTERNAL MEDICINE

## 2022-06-05 PROCEDURE — 25010000002 HEPARIN LOCK FLUSH PER 10 UNITS: Performed by: INTERNAL MEDICINE

## 2022-06-05 PROCEDURE — 80048 BASIC METABOLIC PNL TOTAL CA: CPT | Performed by: INTERNAL MEDICINE

## 2022-06-05 PROCEDURE — 94664 DEMO&/EVAL PT USE INHALER: CPT

## 2022-06-05 PROCEDURE — 25010000002 CEFEPIME PER 500 MG: Performed by: INTERNAL MEDICINE

## 2022-06-05 RX ORDER — FERROUS SULFATE TAB EC 324 MG (65 MG FE EQUIVALENT) 324 (65 FE) MG
324 TABLET DELAYED RESPONSE ORAL
Qty: 90 TABLET | Refills: 1 | Status: SHIPPED | OUTPATIENT
Start: 2022-06-06

## 2022-06-05 RX ORDER — HEPARIN SODIUM (PORCINE) LOCK FLUSH IV SOLN 100 UNIT/ML 100 UNIT/ML
500 SOLUTION INTRAVENOUS ONCE
Status: COMPLETED | OUTPATIENT
Start: 2022-06-05 | End: 2022-06-05

## 2022-06-05 RX ADMIN — SODIUM CHLORIDE 250 MG: 9 INJECTION, SOLUTION INTRAVENOUS at 09:50

## 2022-06-05 RX ADMIN — ALBUTEROL SULFATE 2 PUFF: 108 INHALANT RESPIRATORY (INHALATION) at 11:50

## 2022-06-05 RX ADMIN — FAMOTIDINE 40 MG: 20 TABLET ORAL at 09:50

## 2022-06-05 RX ADMIN — Medication 500 UNITS: at 16:29

## 2022-06-05 RX ADMIN — ALBUTEROL SULFATE 2 PUFF: 108 INHALANT RESPIRATORY (INHALATION) at 08:05

## 2022-06-05 RX ADMIN — FERROUS SULFATE TAB EC 324 MG (65 MG FE EQUIVALENT) 324 MG: 324 (65 FE) TABLET DELAYED RESPONSE at 09:49

## 2022-06-05 RX ADMIN — HEPARIN SODIUM 15 UNITS/KG/HR: 10000 INJECTION, SOLUTION INTRAVENOUS at 06:04

## 2022-06-05 RX ADMIN — APIXABAN 10 MG: 5 TABLET, FILM COATED ORAL at 13:26

## 2022-06-05 RX ADMIN — CEFEPIME HYDROCHLORIDE 2 G: 2 INJECTION, POWDER, FOR SOLUTION INTRAVENOUS at 05:25

## 2022-06-05 RX ADMIN — SODIUM CHLORIDE 75 ML/HR: 9 INJECTION, SOLUTION INTRAVENOUS at 01:32

## 2022-06-05 RX ADMIN — Medication 3 ML: at 09:50

## 2022-06-05 NOTE — THERAPY EVALUATION
Patient Name: Iván Fowler  : 1950    MRN: [id number]                              Today's Date: 2022       Admit Date: 2022    Visit Dx:     ICD-10-CM ICD-9-CM   1. Weakness  R53.1 780.79   2. Hypotension, unspecified hypotension type  I95.9 458.9   3. Single subsegmental pulmonary embolism without acute cor pulmonale (HCC)  I26.93 415.19   4. Enteritis  K52.9 558.9   5. Colitis  K52.9 558.9   6. COVID-19  U07.1 079.89     Patient Active Problem List   Diagnosis   • Acute respiratory distress   • Pneumonia due to COVID-19 virus   • Hypoxia   • Follicular lymphoma (HCC)   • Essential hypertension   • Mixed hyperlipidemia   • Cytokine release syndrome, grade 2   • Pulmonary embolism (HCC)     Past Medical History:   Diagnosis Date   • Hyperlipidemia    • Hypertension      Past Surgical History:   Procedure Laterality Date   • APPENDECTOMY     • US GUIDED LYMPH NODE BIOPSY  2020   • VASECTOMY Bilateral       General Information     [facility] Name 22 1048          Physical Therapy Time and Intention    Document Type evaluation  -     Mode of Treatment physical therapy  -     Row Name 22 1048          General Information    Prior Level of Function independent:;community mobility;gait;transfer;bed mobility;ADL's;work;using stairs;driving  Does not use an AD  -     Barriers to Rehab none identified  -     Row Name 22 1048          Living Environment    People in Home spouse  -     Row Name 22 1048          Home Main Entrance    Number of Stairs, Main Entrance one  -[facility] Name 22 1048          Stairs Within Home, Primary    Number of Stairs, Within Home, Primary none  -     Row Name 22 1048          Cognition    Orientation Status (Cognition) oriented x 4  -     Row Name 22 1048          Safety Issues, Functional Mobility    Impairments Affecting Function (Mobility) pain  -           User Key  (r) = Recorded By, (t) = Taken By, (c) =  Cosigned By    Initials Name Provider Type    Marii Brewster, PT Physical Therapist               Mobility     Row Name 06/05/22 1051          Bed Mobility    Bed Mobility supine-sit;sit-supine  -     Supine-Sit [place] (Bed Mobility) independent  -     Sit-Supine [place] (Bed Mobility) independent  -     Row Name 06/05/22 1051          Sit-Stand Transfer    Sit-Stand [place] (Transfers) independent  -     Comment, (Sit-Stand Transfer) STS from EOB. No LOB noted.  -     Row Name 06/05/22 1051          Gait/Stairs (Locomotion)    [place] Level (Gait) supervision  -     Distance in Feet (Gait) 50ft  -     Deviations/Abnormal Patterns (Gait) gait speed decreased  -     Comment, (Gait/Stairs) Pt ambulates 50ft w/out use of an AD and w/out instability or LOB noted. Decreased gait speed secondary to navigation of lines within room.  -           User Key  (r) = Recorded By, (t) = Taken By, (c) = Cosigned By    Initials Name Provider Type    Marii Brewster PT Physical Therapist               Obj/Interventions     Row Name 06/05/22 1053          Range of Motion Comprehensive    General Range of Motion no range of motion deficits identified  -[facility] Name 06/05/22 1053          Strength Comprehensive (MMT)    General Manual Muscle Testing (MMT) Assessment no strength deficits identified  -     Comment, General Manual Muscle Testing (MMT) Assessment BLE grossly 4+/5. No discomfort when R knee was tested  -[facility] Name 06/05/22 1053          Balance    Balance Assessment sitting static balance;sitting dynamic balance;standing static balance;standing dynamic balance  -     Static Sitting Balance independent  -     Dynamic Sitting Balance independent  -SOREN     Position, Sitting Balance unsupported;sitting edge of bed  -     Static Standing Balance independent  -     Dynamic Standing Balance supervision  -     Row Name 06/05/22 1053          Sensory Assessment  (Somatosensory)    Sensory Assessment (Somatosensory) sensation intact  -           User Key  (r) = Recorded By, (t) = Taken By, (c) = Cosigned By    Initials Name Provider Type    Marii Brewster PT Physical Therapist               Goals/Plan    No documentation.                Clinical Impression     Row Name 06/05/22 105          Pain    Additional Documentation Pain Scale: FACES Pre/Post-Treatment (Group)  -SOREN     Row Name 06/05/22 1054          Pain Scale: FACES Pre/Post-Treatment    Pain: FACES Scale, Pretreatment 2-->hurts little bit  -SOREN     Posttreatment Pain Rating 4-->hurts little more  -SOREN     Pain Location - Side/Orientation Right  -     Pain Location - knee  -     Row Name 06/05/22 1051          Plan of Care Review    Plan of Care Reviewed With patient;spouse  -     Outcome Evaluation Pt is a 72 y/o male who presents to [facility] following a weakness and lightheadedness w/ hypotension resulting in a fall and landing on his right knee. Pt was diagnosed with COVID-19 in Feb. and COVID test was positive this admission, but is asymptomatic and unsure if it is a residual positive test from February per family medicine. Pt with pulmonary emboli and was started on heparin 6/2. Pt has follicular non-Hodgkin's lymphoma stage III and is being seen by oncology. X-ray of R knee (-). Pt reporting symptoms have improved significantly. His ROM has improved with full knee ext. and knee flexion is WFL. Pt reports minimal increased discomfort with WB. Tenderness to palpation at the medial epicondyle. At [facility] pt lives with his spouse in a H w/ one step to enter. He was working and driving. Pt reports no other falls in the past 6 months. At this time pt is independent w/ bed mobility, transfers and ambulation x50ft w/out a RW. Pt denies lightheadedness and chest pain throughout. No LOB noted during ambulation. Pt has good support from spouse and family. Pt is not at a falls risk at this time. PT will complete the  orders as he is independent w/ mobility and is able to transfer/ambulate with nursing staff.  -     Row Name 06/05/22 1054          Therapy Assessment/Plan (PT)    Therapy Frequency (PT) evaluation only  -     Row Name 06/05/22 1054          Vital Signs    O2 Delivery Pre Treatment room air  -     O2 Delivery Intra Treatment room air  -     O2 Delivery Post Treatment room air  -     Row Name 06/05/22 1054          Positioning and Restraints    Pre-Treatment Position in bed  -SOREN     Post Treatment Position bed  -SOREN     In Bed notified nsg;call light within reach;encouraged to call for assist;with family/caregiver;fowlers  -           User Key  (r) = Recorded By, (t) = Taken By, (c) = Cosigned By    Initials Name Provider Type    Marii Brewster PT Physical Therapist               Outcome Measures     Row Name 06/05/22 1108          How much help from another person do you currently need...    Turning from your back to your side while in flat bed without using bedrails? 4  -SOREN     Moving from lying on back to sitting on the side of a flat bed without bedrails? 4  -SOREN     Moving to and from a bed to a chair (including a wheelchair)? 3  -SOREN     Standing up from a chair using your arms (e.g., wheelchair, bedside chair)? 3  -SOREN     Climbing 3-5 steps with a railing? 3  -SOREN     To walk in hospital room? 3  -SOREN     AM-PAC 6 Clicks Score (PT) 20  -SOREN     Highest level of mobility 6 --> Walked 10 steps or more  -     Row Name 06/05/22 1108          Functional Assessment    Outcome Measure Options AM-PAC 6 Clicks Basic Mobility (PT)  -           User Key  (r) = Recorded By, (t) = Taken By, (c) = Cosigned By    Initials Name Provider Type    Marii Brewster PT Physical Therapist                             Physical Therapy Education                 Title: PT OT SLP Therapies (Done)     Topic: Physical Therapy (Done)     Point: Mobility training (Done)     Learning Progress Summary           Patient  Acceptance, E,TB, VU by  at 6/5/2022 1108                   Point: Home exercise program (Done)     Learning Progress Summary           Patient Acceptance, E,TB, VU by  at 6/5/2022 1108                   Point: Body mechanics (Done)     Learning Progress Summary           Patient Acceptance, E,TB, VU by  at 6/5/2022 1108                   Point: Precautions (Done)     Learning Progress Summary           Patient Acceptance, E,TB, VU by  at 6/5/2022 1108                               User Key     Initials Effective Dates Name Provider Type Discipline     08/23/21 -  Marii Colmenares, PT Physical Therapist PT              PT Recommendation and Plan     Plan of Care Reviewed With: patient, spouse  Outcome Evaluation: Pt is a 72 y/o male who presents to [facility] following a weakness and lightheadedness w/ hypotension resulting in a fall and landing on his right knee. Pt was diagnosed with COVID-19 in Feb. and COVID test was positive this admission, but is asymptomatic and unsure if it is a residual positive test from February per family medicine. Pt with pulmonary emboli and was started on heparin 6/2. Pt has follicular non-Hodgkin's lymphoma stage III and is being seen by oncology. X-ray of R knee (-). Pt reporting symptoms have improved significantly. His ROM has improved with full knee ext. and knee flexion is WFL. Pt reports minimal increased discomfort with WB. Tenderness to palpation at the medial epicondyle. At [facility] pt lives with his spouse in a H w/ one step to enter. He was working and driving. Pt reports no other falls in the past 6 months. At this time pt is independent w/ bed mobility, transfers and ambulation x50ft w/out a RW. Pt denies lightheadedness and chest pain throughout. No LOB noted during ambulation. Pt has good support from spouse and family. Pt is not at a falls risk at this time. PT will complete the orders as he is independent w/ mobility and is able to transfer/ambulate with nursing  staff.     Time Calculation:    PT Charges     Row Name 06/05/22 1120             Time Calculation    Start Time 0726  -SOREN      Stop Time 0748  -SOREN      Time Calculation (min) 22 min  -SOREN      PT Received On 06/05/22  -SOREN            User Key  (r) = Recorded By, (t) = Taken By, (c) = Cosigned By    Initials Name Provider Type    Marii Brewster, PT Physical Therapist              Therapy Charges for Today     Code Description Service Date Service Provider Modifiers Qty    54129233682 HC PT EVAL MOD COMPLEXITY 4 6/5/2022 Marii Colmenares, PT GP 1          PT G-Codes  Outcome Measure Options: AM-PAC 6 Clicks Basic Mobility (PT)  AM-PAC 6 Clicks Score (PT): 20    Marii Colmenares PT  6/5/2022

## 2022-06-05 NOTE — CASE MANAGEMENT/SOCIAL WORK
Continued Stay Note  [facility]     Patient Name: Iván Fowler  MRN: [id number]  Today's Date: 6/5/2022    Admit Date: 6/2/2022     Discharge Plan     Row Name 06/05/22 1240       Plan    Plan Comments Per [facility] pharmacist, [employer] is requiring a PA. Pharmacist started process, but asked for CM assistance to submit.Key: [id number] - PA Case ID: [id number]. PA approved. Pharmacist and Jada Pérez NP notified via secure chat.              Phone communication or documentation only - no physical contact with patient or family.    Lucita Stewart RN  Weekend   [place] [id number]  Office: [phone]  Fax: [phone]  [email or web address]

## 2022-06-05 NOTE — DISCHARGE SUMMARY
Date of Discharge:  6/5/2022    Discharge Diagnosis:   **Pulmonary embolism (HCC) [I26.99]   COVID-19 [U07.1]   Right knee pain [M25.561]   Follicular lymphoma (HCC) [C82.90]   Mixed hyperlipidemia [E78.2]   Anemia    Presenting Problem/History of Present Illness  Active Hospital Problems    Diagnosis  POA   • **Pulmonary embolism (HCC) [I26.99]  Yes   • COVID-19 [U07.1]  Yes   • Right knee pain [M25.561]  Yes   • Follicular lymphoma (HCC) [C82.90]  Yes   • Mixed hyperlipidemia [E78.2]  Yes      Resolved Hospital Problems   No resolved problems to display.          Hospital Course  Patient is a 71 y.o. male with h/o follicular lymphoma in remission presented with a fall at home after developing lightheadedness.  He was found to be hypotensive, COVID-positive and have a small pulmonary embolism.  Venous Dopplers were negative for DVT.  He had no symptoms related to COVID-19.  He was hospitalized in February 2022 with COVID.  It is thought that his current positive test is persistent since that previous admission.  He was treated for the pulmonary embolism with heparin drip and then transition to Eliquis at discharge.  He had some right knee pain and soft tissue swelling that improved with elevation and ice.  He is able to bear weight well.  X-ray of the right knee was normal.  He was anemic with hemoglobin as low as 7.8.  He received IV iron.  He continues on vitamin B12 as an outpatient.  He is followed closely by Dr. Pringle for maintenance chemotherapy for his follicular lymphoma.  At the time of discharge she is hemodynamically stable and feeling well.  He does not have an oxygen requirement.  He understands the importance of compliance with Eliquis.  He will follow-up with Dr. Manzano's office this week for repeat CBC and then for routine follow-up appointments as scheduled.  He will see his primary care doctor within a month.    Patient's initial CT scan of his abdomen was suggestive of possible colitis.  He had  no abdominal pain, diarrhea or other GI symptoms.  Findings on CT scan likely were related to hypotension.  He is not being treated at discharge for colitis.    Procedures Performed         Consults:   Consults     Date and Time Order Name Status Description    6/3/2022 12:34 AM Hematology & Oncology Inpatient Consult Completed           Pertinent Test Results:    Lab Results (most recent)     Procedure Component Value Units Date/Time    aPTT [[id number]]  (Abnormal) Collected: 06/05/22 1300    Specimen: Blood Updated: 06/05/22 1336     PTT >139.0 seconds      Comment: Result checked        Manual Differential [[id number]]  (Abnormal) Collected: 06/05/22 0528    Specimen: Blood Updated: 06/05/22 0735     Neutrophil % 73.0 %      Lymphocyte % 0.0 %      Monocyte % 12.0 %      Eosinophil % 4.0 %      Bands %  10.0 %      Metamyelocyte % 1.0 %      Neutrophils Absolute 3.98 10*3/mm3      Lymphocytes Absolute 0.00 10*3/mm3      Monocytes Absolute 0.58 10*3/mm3      Eosinophils Absolute 0.19 10*3/mm3      Anisocytosis Slight/1+     WBC Morphology Normal     Large Platelets Slight/1+    CBC & Differential [[id number]]  (Abnormal) Collected: 06/05/22 0528    Specimen: Blood Updated: 06/05/22 0735    Narrative:      The following orders were created for panel order CBC & Differential.  Procedure                               Abnormality         Status                     ---------                               -----------         ------                     CBC Auto Differential[[id number]]        Abnormal            Final result               Scan Slide[[id number]]                                       Final result                 Please view results for these tests on the individual orders.    CBC Auto Differential [[id number]]  (Abnormal) Collected: 06/05/22 0528    Specimen: Blood Updated: 06/05/22 0735     WBC 4.80 10*3/mm3      RBC 3.00 10*6/mm3      Hemoglobin 7.8 g/dL      Hematocrit 23.9 %      MCV 79.6 fL      MCH 25.9  pg      MCHC 32.5 g/dL      RDW 17.7 %      RDW-SD 50.3 fl      MPV 7.4 fL      Platelets 233 10*3/mm3     Narrative:      The previously reported component NRBC is no longer being reported. Previous result was 0.0 /100 WBC (Reference Range: 0.0-0.2 /100 WBC) on 6/5/2022 at 0620 EDT.    Scan Slide [[id number]] Collected: 06/05/22 0528    Specimen: Blood Updated: 06/05/22 0735     Scan Slide --     Comment: See Manual Differential Results       aPTT [[id number]]  (Abnormal) Collected: 06/05/22 0528    Specimen: Blood Updated: 06/05/22 0638     PTT 44.9 seconds     Basic Metabolic Panel [[id number]]  (Abnormal) Collected: 06/05/22 0528    Specimen: Blood Updated: 06/05/22 0635     Glucose 92 mg/dL      BUN 17 mg/dL      Creatinine 1.00 mg/dL      Sodium 145 mmol/L      Potassium 3.7 mmol/L      Chloride 111 mmol/L      CO2 24.0 mmol/L      Calcium 8.9 mg/dL      BUN/Creatinine Ratio 17.0     Anion Gap 10.0 mmol/L      eGFR 81.5 mL/min/1.73      Comment: National Kidney Foundation and American Society of Nephrology (ASN) Task Force recommended calculation based on the Chronic Kidney Disease Epidemiology Collaboration (CKD-EPI) equation refit without adjustment for race.       Narrative:      GFR Normal >60  Chronic Kidney Disease <60  Kidney Failure <15      Haptoglobin [[id number]]  (Abnormal) Collected: 06/04/22 1100    Specimen: Blood Updated: 06/04/22 1958     Haptoglobin 317 mg/dL     Blood Culture - Blood, Blood, Venous Line [[id number]]  (Normal) Collected: 06/02/22 1650    Specimen: Blood, Venous Line Updated: 06/04/22 1701     Blood Culture No growth at 2 days    Blood Culture - Blood, Blood, Venous Line [[id number]]  (Normal) Collected: 06/02/22 1553    Specimen: Blood, Venous Line Updated: 06/04/22 1602     Blood Culture No growth at 2 days    Extra Tubes [[id number]] Collected: 06/04/22 1309    Specimen: Blood, Venous Line Updated: 06/04/22 1417    Narrative:      The following orders were created for panel order  Extra Tubes.  Procedure                               Abnormality         Status                     ---------                               -----------         ------                     Green Top (Gel)[[id number]]                                  Final result                 Please view results for these tests on the individual orders.    Green Top (Gel) [[id number]] Collected: 06/04/22 1309    Specimen: Blood Updated: 06/04/22 1417     Extra Tube Hold for add-ons.     Comment: Auto resulted.       Basic Metabolic Panel [[id number]]  (Abnormal) Collected: 06/04/22 0009    Specimen: Blood Updated: 06/04/22 0044     Glucose 105 mg/dL      BUN 24 mg/dL      Creatinine 1.40 mg/dL      Sodium 145 mmol/L      Potassium 3.9 mmol/L      Chloride 112 mmol/L      CO2 24.0 mmol/L      Calcium 9.0 mg/dL      BUN/Creatinine Ratio 17.1     Anion Gap 9.0 mmol/L      eGFR 54.4 mL/min/1.73      Comment: National Kidney Foundation and American Society of Nephrology (ASN) Task Force recommended calculation based on the Chronic Kidney Disease Epidemiology Collaboration (CKD-EPI) equation refit without adjustment for race.       Narrative:      GFR Normal >60  Chronic Kidney Disease <60  Kidney Failure <15      CBC & Differential [[id number]]  (Abnormal) Collected: 06/04/22 0009    Specimen: Blood Updated: 06/04/22 0039    Narrative:      The following orders were created for panel order CBC & Differential.  Procedure                               Abnormality         Status                     ---------                               -----------         ------                     CBC Auto Differential[[id number]]        Abnormal            Final result               Scan Slide[[id number]]                                                                    Please view results for these tests on the individual orders.    CBC Auto Differential [[id number]]  (Abnormal) Collected: 06/04/22 0009    Specimen: Blood Updated: 06/04/22 0039     WBC 4.90  10*3/mm3      RBC 3.23 10*6/mm3      Hemoglobin 8.2 g/dL      Hematocrit 25.5 %      MCV 79.0 fL      MCH 25.5 pg      MCHC 32.3 g/dL      RDW 17.8 %      RDW-SD 49.9 fl      MPV 7.4 fL      Platelets 282 10*3/mm3      Neutrophil % 75.8 %      Lymphocyte % 3.9 %      Monocyte % 17.1 %      Eosinophil % 2.9 %      Basophil % 0.3 %      Neutrophils, Absolute 3.70 10*3/mm3      Lymphocytes, Absolute 0.20 10*3/mm3      Monocytes, Absolute 0.80 10*3/mm3      Eosinophils, Absolute 0.10 10*3/mm3      Basophils, Absolute 0.00 10*3/mm3      nRBC 0.1 /100 WBC     Ferritin [[id number]]  (Normal) Collected: 06/03/22 0331    Specimen: Blood Updated: 06/03/22 1141     Ferritin 283.60 ng/mL     Narrative:      Results may be falsely decreased if patient taking Biotin.      Iron Profile [[id number]]  (Abnormal) Collected: 06/03/22 0331    Specimen: Blood Updated: 06/03/22 1140     Iron 42 mcg/dL      Iron Saturation 11 %      Transferrin 246 mg/dL      TIBC 367 mcg/dL     Pathology Consultation [[id number]] Collected: 06/03/22 0331    Specimen: Blood, Venous Line Updated: 06/03/22 0858     Final Diagnosis --     Left shifted granulocytes  Anemia  No blasts identified       Case Report --     Surgical Pathology Report                         Case: [id number]                                  Authorizing Provider:  Rebeka Morales MD            Collected:           06/03/2022 03:31 AM          Ordering Location:     [facility]       Received:            06/03/2022 07:32 AM                                 PROGRESS CARE                                                                Pathologist:           Lakhwinder Tavera MD                                                            Specimen:    Blood, Venous Line                                                                         MRSA Screen, PCR (Inpatient) - Swab, Nares [[id number]]  (Normal) Collected: 06/03/22 0333    Specimen: Swab from Nares Updated: 06/03/22 0451     MRSA  PCR No MRSA Detected    Manual Differential [[id number]]  (Abnormal) Collected: 06/03/22 0331    Specimen: Blood Updated: 06/03/22 0410     Neutrophil % 85.0 %      Lymphocyte % 3.0 %      Monocyte % 5.0 %      Bands %  6.0 %      Blasts % 1.0 %      Neutrophils Absolute 8.19 10*3/mm3      Lymphocytes Absolute 0.27 10*3/mm3      Monocytes Absolute 0.45 10*3/mm3      Elliptocytes Slight/1+     WBC Morphology Normal     Large Platelets Slight/1+    Path Consult Reflex [[id number]] Collected: 06/03/22 0331    Specimen: Blood Updated: 06/03/22 0410     Pathology Review Yes    Scan Slide [[id number]] Collected: 06/03/22 0331    Specimen: Blood Updated: 06/03/22 0410     Scan Slide --     Comment: See Manual Differential Results       Urinalysis With Microscopic If Indicated (No Culture) - Urine, Clean Catch [[id number]]  (Abnormal) Collected: 06/02/22 2008    Specimen: Urine, Clean Catch Updated: 06/02/22 2033     Color, UA Dark Yellow     Appearance, UA Clear     pH, UA 6.0     Specific Gravity, UA 1.045     Glucose, UA Negative     Ketones, UA Negative     Bilirubin, UA Negative     Blood, UA Negative     Protein, UA Trace     Leuk Esterase, UA Negative     Nitrite, UA Negative     Urobilinogen, UA 0.2 E.U./dL    Narrative:      Urine microscopic not indicated.    COVID PRE-OP / PRE-PROCEDURE SCREENING ORDER (NO ISOLATION) - Swab, Nasopharynx [[id number]]  (Abnormal) Collected: 06/02/22 1712    Specimen: Swab from Nasopharynx Updated: 06/02/22 1738    Narrative:      The following orders were created for panel order COVID PRE-OP / PRE-PROCEDURE SCREENING ORDER (NO ISOLATION) - Swab, Nasopharynx.  Procedure                               Abnormality         Status                     ---------                               -----------         ------                     COVID-19,CEPHEID/LUCIA,CO...[[id number]]  Abnormal            Final result                 Please view results for these tests on the individual orders.     COVID-19,CEPHEID/LUCIA,COR/SANDIE/PAD/LYNNETTE IN-HOUSE(OR EMERGENT/ADD-ON),NP SWAB IN TRANSPORT MEDIA 3-4 HR TAT, RT-PCR - Swab, Nasopharynx [[id number]]  (Abnormal) Collected: 06/02/22 1712    Specimen: Swab from Nasopharynx Updated: 06/02/22 1738     COVID19 Detected    Narrative:      Fact sheet for providers: https://www.fda.gov/media/370713/download     Fact sheet for patients: https://www.fda.gov/media/262473/download  Fact sheet for providers: https://www.fda.gov/media/109283/download    Fact sheet for patients: https://www.[employer].gov/media/121238/download    Test performed by PCR.    D-dimer, Quantitative [[id number]]  (Abnormal) Collected: 06/02/22 1553    Specimen: Blood Updated: 06/02/22 1647     D-Dimer, Quantitative >35.20 mg/L (FEU)     Narrative:      Reference Range  --------------------------------------------------------------------     < 0.50   Negative Predictive Value  0.50-0.59   Indeterminate    >= 0.60   Probable VTE             A very low percentage of patients with DVT may yield D-Dimer results   below the cut-off of 0.50 mg/L FEU.  This is known to be more   prevalent in patients with distal DVT.             Results of this test should always be interpreted in conjunction with   the patient's medical history, clinical presentation and other   findings.  Clinical diagnosis should not be based on the result of   IN[place]OVANCE D-Dimer alone.    Protime-INR [[id number]]  (Normal) Collected: 06/02/22 1553    Specimen: Blood Updated: 06/02/22 1645     Protime 10.9 Seconds      INR 1.06    TSH [[id number]]  (Abnormal) Collected: 06/02/22 1553    Specimen: Blood Updated: 06/02/22 1637     TSH 4.220 uIU/mL     Cortisol [[id number]] Collected: 06/02/22 1553    Specimen: Blood Updated: 06/02/22 1637     Cortisol 47.52 mcg/dL     Narrative:      Cortisol Reference Ranges:    Cortisol 6AM - 10AM Range: 6.02-18.40 mcg/dl  Cortisol 4PM - 8PM Range: 2.68-10.50 mcg/dl      Results may be falsely increased if patient taking  Biotin.      Troponin [[id number]]  (Normal) Collected: 06/02/22 1553    Specimen: Blood Updated: 06/02/22 1637     Troponin T <0.010 ng/mL     Narrative:      Troponin T Reference Range:  <= 0.03 ng/mL-   Negative for AMI  >0.03 ng/mL-     Abnormal for myocardial necrosis.  Clinicians would have to utilize clinical acumen, EKG, Troponin and serial changes to determine if it is an Acute Myocardial Infarction or myocardial injury due to an underlying chronic condition.       Results may be falsely decreased if patient taking Biotin.      Comprehensive Metabolic Panel [[id number]]  (Abnormal) Collected: 06/02/22 1553    Specimen: Blood Updated: 06/02/22 1623     Glucose 129 mg/dL      BUN 26 mg/dL      Creatinine 1.84 mg/dL      Sodium 139 mmol/L      Potassium 4.4 mmol/L      Chloride 104 mmol/L      CO2 19.0 mmol/L      Calcium 9.3 mg/dL      Total Protein 6.2 g/dL      Albumin 3.80 g/dL      ALT (SGPT) 12 U/L      AST (SGOT) 18 U/L      Alkaline Phosphatase 48 U/L      Total Bilirubin 0.3 mg/dL      Globulin 2.4 gm/dL      A/G Ratio 1.6 g/dL      BUN/Creatinine Ratio 14.1     Anion Gap 16.0 mmol/L      eGFR 39.2 mL/min/1.73      Comment: National Kidney Foundation and American Society of Nephrology (ASN) Task Force recommended calculation based on the Chronic Kidney Disease Epidemiology Collaboration (CKD-EPI) equation refit without adjustment for race.       Narrative:      GFR Normal >60  Chronic Kidney Disease <60  Kidney Failure <15      CK [[id number]]  (Normal) Collected: 06/02/22 1553    Specimen: Blood Updated: 06/02/22 1623     Creatine Kinase 59 U/L     Magnesium [[id number]]  (Normal) Collected: 06/02/22 1553    Specimen: Blood Updated: 06/02/22 1623     Magnesium 1.8 mg/dL     POC Lactate [[id number]]  (Normal) Collected: 06/02/22 1557    Specimen: Blood Updated: 06/02/22 1558     Lactate 0.8 mmol/L      Comment: Serial Number: [id number]:  [id number]                        Condition on Discharge:  Stable    Vital Signs  Temp:  [97.3 °F (36.3 °C)-99.1 °F (37.3 °C)] 97.3 °F (36.3 °C)  Heart Rate:  [79-95] 85  Resp:  [16] 16  BP: (124-149)/(67-77) 149/73    Physical Exam:     General Appearance:    Alert, cooperative, in no acute distress   Head:    Normocephalic, without obvious abnormality, atraumatic   Eyes:            Lids and lashes normal, conjunctivae and sclerae normal, no   icterus, no pallor, corneas clear, PERRLA   Ears:    Ears appear intact with no abnormalities noted   Throat:   No oral lesions, no thrush, oral mucosa moist   Neck:   No adenopathy, supple, trachea midline, no thyromegaly, no   carotid bruit, no JVD   Lungs:     Clear to auscultation,respirations regular, even and                  unlabored    Heart:    Regular rhythm and normal rate, normal S1 and S2, no            murmur, no gallop, no rub, no click   Chest Wall:    No abnormalities observed   Abdomen:     Normal bowel sounds, no masses, no organomegaly, soft        non-tender, non-distended, no guarding, no rebound                tenderness   Extremities:   Moves all extremities well, no edema, no cyanosis, no             redness   Pulses:   Pulses palpable and equal bilaterally   Skin:   No bleeding, bruising or rash   Lymph nodes:   No palpable adenopathy   Neurologic:   Cranial nerves 2 - 12 grossly intact, sensation intact, DTR       present and equal bilaterally       Discharge Disposition  Home or Self Care    Discharge Medications     Discharge Medications      New Medications      Instructions Start Date   apixaban 5 MG tablet tablet  Commonly known as: ELIQUIS   10 mg, Oral, 2 Times Daily      apixaban 5 MG tablet tablet  Commonly known as: ELIQUIS   5 mg, Oral, 2 Times Daily   Start Date: June 12, 2022     ferrous sulfate 324 (65 Fe) MG tablet delayed-release EC tablet   324 mg, Oral, Daily With Breakfast   Start Date: June 6, 2022        Continue These Medications      Instructions Start Date   acetaminophen 325 MG  tablet  Commonly known as: TYLENOL   650 mg, Oral, Every 4 Hours PRN      fenofibrate 145 MG tablet  Commonly known as: TRICOR   145 mg, Oral, Daily      finasteride 5 MG tablet  Commonly known as: PROSCAR   5 mg, Oral, Nightly      hydrALAZINE 25 MG tablet  Commonly known as: APRESOLINE   25 mg, Oral, 2 Times Daily         Stop These Medications    albuterol sulfate  (90 Base) MCG/ACT inhaler  Commonly known as: PROVENTIL HFA;VENTOLIN HFA;PROAIR HFA     budesonide-formoterol 80-4.5 MCG/ACT inhaler  Commonly known as: Symbicort            Discharge Diet:     Activity at Discharge:     Follow-up Appointments  No future appointments.  Additional Instructions for the Follow-ups that You Need to Schedule     Discharge Follow-up with PCP   As directed       Currently Documented PCP:    Mat Aiken MD    PCP Phone Number:    [phone]     Follow Up Details: One month         Discharge Follow-up with Specified Provider: Maryann Pringle; 2 Weeks   As directed      To: Maryann Pringle    Follow Up: 2 Weeks         Discharge Follow-up with Specified Provider: Dr. Pringle - call his office this week to arrange CBC   As directed      To: Dr. Pringle - call his office this week to arrange CBC               Test Results Pending at Discharge  Pending Labs     Order Current Status    Blood Culture - Blood, Blood, Venous Line Preliminary result    Blood Culture - Blood, Blood, Venous Line Preliminary result           Rebeka Morales MD  06/05/22  13:44 EDT    Time: Discharge 25 min

## 2022-06-05 NOTE — PROGRESS NOTES
Hematology/Oncology Inpatient Progress Note    PATIENT NAME: Iván Fowler  : 1950  MRN: [id number]    CHIEF COMPLAINT: Right lung PE, follicular non-Hodgkin's lymphoma stage III, vitamin B12 and iron deficiency anemia    HISTORY OF PRESENT ILLNESS:  71 y.o. male admitted to to [facility] through the ED on 2022 after presenting with weakness and lightheadedness with hypotension leading to a fall with pain and edema in the right knee.  PMH is significant for NHL on maintenance rituximab and history of hospitalization in 2022 with COVID-19 pneumonia at which time he received remdesivir and dexamethasone.  He denied any fevers, chills, cough, chest pain, night sweats, nausea.  He reported 1 episode of diarrhea in the morning of admission.  CXR was negative for acute findings.  CBC revealed WBC 15.4, hemoglobin 12.1, MCV 80, RDW 17.8, platelets 461,000.  CMP revealed creatinine elevated to 1.84 with BUN 26 and LFTs were not elevated.  Coags were okay.  D-dimer was high at 35.2 (0-0.59).  Troponin was normal.  COVID-19 screen was positive.  CT PE protocol was performed secondary to high D-dimer and revealed right lower lobe PE without evidence of heart strain.  There was no lymphadenopathy, mass, or pneumonia present.  CT abdomen and pelvis showed a small amount of ascites and diffuse wall thickening throughout the small bowel with mucosal hyperenhancement and mesenteric hyperemia.  It was felt this could possibly relate to enteritis/colitis versus inflammatory bowel disease or shock bowel with hypovolemic shock.  Arterial and venous flow from the bowel appeared intact.  There was no lymphadenopathy.  He was started on heparin drip and IV antibiotics. At time of consultation 6/3/2022 hemoglobin had dropped to 9.0.     22  Hematology/Oncology was consulted as the patient is known to our service and followed for follicular NHL stage III diagnosed 2020, vitamin B12  deficiency and iron deficiency anemia diagnosed in April 2022 and hypercalcemia diagnosed March 2022.  He received Bendamustine with rituximab for his lymphoma with complete response and is now on rituximab maintenance.  He received cycle 6 rituximab on 5/19/2022.  Vitamin B12 deficiency has been treated with vitamin B12 injections.  He has 3 weekly injections remaining with the most recent dose given 6/2/2022.  Iron studies in April 2022 had iron sat of 9% and ferritin 356.  Folate level was normal and there was no hemolysis.  He has been on ferrous sulfate 325 mg by mouth daily.  In March 2022 serum calcium was mildly elevated at 10.5 with ionized calcium, vitamin D, and PTH all normal.  He was last seen as an outpatient on 5/3/2022 where CBC revealed WBC 5.29, hemoglobin 8.46, and platelets 396,500.  Pernicious anemia work-up was negative.  Stool Hemoccult was negative.  ACE was 40 (9-67).  Additional hypercalcemia work-up revealed vitamin A 48.7 (30-75) and aluminum was 4 (less than 9).  Serum calcium at that visit had normalized at 8.7.     PCP: Mat Aiken MD     INTERVAL HISTORY:  • 6/4/2022- white count 4.9 hemoglobin 8.2, MCV 79 and platelets 282.  Creatinine 1.4.  X-ray right knee unremarkable.  Bilateral lower extremity venous Dopplers negative for DVT.  Iron 42 (), iron saturation 11 (20-50), TIBC 367 (298-536), ferritin 283.6 ().  Initiated Ferrlecit 250 mg IV daily x4 for iron deficit.  • 6/5/2022- hemoglobin 7.8, MCV 79.6.  Haptoglobin 317 ().  UA without heme.    Subjective   Denies overt bleeding.  He is ready to go home.  He has been ambulating in the unit.  Minimal pain in right knee.    ROS:  Review of Systems   Constitutional: Negative for activity change, chills, fatigue, fever and unexpected weight change.   HENT: Negative for congestion, dental problem, hearing loss, mouth sores, nosebleeds, sore throat and trouble swallowing.    Eyes: Negative for photophobia and  "visual disturbance.   Respiratory: Negative for cough, chest tightness and shortness of breath.    Cardiovascular: Negative for chest pain, palpitations and leg swelling.   Gastrointestinal: Negative for abdominal distention, abdominal pain, blood in stool, constipation, diarrhea, nausea and vomiting.   Endocrine: Negative for cold intolerance and heat intolerance.   Genitourinary: Negative for decreased urine volume, difficulty urinating, dysuria, enuresis, frequency, hematuria and urgency.   Musculoskeletal: Positive for arthralgias (Right knee pain.). Negative for gait problem.   Skin: Negative for rash and wound.   Neurological: Negative for dizziness, tremors, weakness, light-headedness, numbness and headaches.   Hematological: Negative for adenopathy. Does not bruise/bleed easily.   Psychiatric/Behavioral: Negative for confusion and hallucinations. The patient is not nervous/anxious.    All other systems reviewed and are negative.       MEDICATIONS:    Scheduled Meds:  albuterol sulfate HFA, 2 puff, Inhalation, 4x Daily - RT  cefepime, 2 g, Intravenous, Q12H  famotidine, 40 mg, Oral, Daily  ferric gluconate, 250 mg, Intravenous, Daily  ferrous sulfate, 324 mg, Oral, Daily With Breakfast  finasteride, 5 mg, Oral, Nightly  sodium chloride, 3 mL, Intravenous, Q12H       Continuous Infusions:  heparin, 18 Units/kg/hr, Last Rate: 17.1 Units/kg/hr (06/05/22 0701)  Pharmacy to Dose Cefepime,   sodium chloride, 75 mL/hr, Last Rate: 75 mL/hr (06/05/22 0132)       PRN Meds:  •  acetaminophen  •  heparin  •  heparin  •  nitroglycerin  •  ondansetron  •  Pharmacy to Dose Cefepime  •  potassium chloride  •  potassium chloride  •  [COMPLETED] Insert peripheral IV **AND** sodium chloride  •  sodium chloride     ALLERGIES:  No Known Allergies    Objective    VITALS:   /73   Pulse 85   Temp 97.3 °F (36.3 °C) (Oral)   Resp 16   Ht 180.3 cm (71\")   Wt 74.6 kg (164 lb 7.4 oz)   SpO2 98%   BMI 22.94 kg/m²     PHYSICAL " EXAM:  Physical Exam  Vitals and nursing note reviewed.   Constitutional:       General: He is not in acute distress.     Appearance: Normal appearance. He is well-developed. He is not diaphoretic.   HENT:      Head: Normocephalic and atraumatic.      Comments: Male pattern baldness.     Right Ear: External ear normal.      Left Ear: External ear normal.      Nose: Nose normal.      Mouth/Throat:      Mouth: Mucous membranes are moist.      Pharynx: Oropharynx is clear. No oropharyngeal exudate or posterior oropharyngeal erythema.   Eyes:      General: No scleral icterus.     Extraocular Movements: Extraocular movements intact.      Conjunctiva/sclera: Conjunctivae normal.      Pupils: Pupils are equal, round, and reactive to light.      Comments: Eyeglasses.   Cardiovascular:      Rate and Rhythm: Normal rate and regular rhythm.      Heart sounds: Normal heart sounds. No murmur heard.     Comments: Cardiac monitor leads.  Pulmonary:      Effort: Pulmonary effort is normal. No respiratory distress.      Breath sounds: No stridor. Rales (In bases bilaterally.) present. No wheezing.      Comments: Right chest wall port.  Chest:   Breasts:      Right: No supraclavicular adenopathy.      Left: No supraclavicular adenopathy.       Abdominal:      General: Bowel sounds are normal. There is no distension.      Palpations: Abdomen is soft. There is no mass.      Tenderness: There is no abdominal tenderness. There is no guarding.   Genitourinary:     Comments: Deferred   Musculoskeletal:         General: No swelling, tenderness or deformity. Normal range of motion.      Cervical back: Normal range of motion and neck supple.      Right lower leg: No edema.      Left lower leg: No edema.      Comments: Bandage on left upper extremity.   Lymphadenopathy:      Cervical: No cervical adenopathy.      Upper Body:      Right upper body: No supraclavicular adenopathy.      Left upper body: No supraclavicular adenopathy.   Skin:      General: Skin is warm and dry.      Coloration: Skin is not pale.      Findings: Bruising (Few small ecchymosis on arms.) present. No erythema or rash.   Neurological:      General: No focal deficit present.      Mental Status: He is alert and oriented to person, place, and time.      Coordination: Coordination normal.   Psychiatric:         Mood and Affect: Mood normal.         Behavior: Behavior normal.         Thought Content: Thought content normal.         Judgment: Judgment normal.           RECENT LABS:  Lab Results (last 24 hours)     Procedure Component Value Units Date/Time    Manual Differential [[id number]]  (Abnormal) Collected: 06/05/22 0528    Specimen: Blood Updated: 06/05/22 0735     Neutrophil % 73.0 %      Lymphocyte % 0.0 %      Monocyte % 12.0 %      Eosinophil % 4.0 %      Bands %  10.0 %      Metamyelocyte % 1.0 %      Neutrophils Absolute 3.98 10*3/mm3      Lymphocytes Absolute 0.00 10*3/mm3      Monocytes Absolute 0.58 10*3/mm3      Eosinophils Absolute 0.19 10*3/mm3      Anisocytosis Slight/1+     WBC Morphology Normal     Large Platelets Slight/1+    CBC & Differential [[id number]]  (Abnormal) Collected: 06/05/22 0528    Specimen: Blood Updated: 06/05/22 0735    Narrative:      The following orders were created for panel order CBC & Differential.  Procedure                               Abnormality         Status                     ---------                               -----------         ------                     CBC Auto Differential[[id number]]        Abnormal            Final result               Scan Slide[[id number]]                                       Final result                 Please view results for these tests on the individual orders.    CBC Auto Differential [[id number]]  (Abnormal) Collected: 06/05/22 0528    Specimen: Blood Updated: 06/05/22 0735     WBC 4.80 10*3/mm3      RBC 3.00 10*6/mm3      Hemoglobin 7.8 g/dL      Hematocrit 23.9 %      MCV 79.6 fL      MCH 25.9 pg       MCHC 32.5 g/dL      RDW 17.7 %      RDW-SD 50.3 fl      MPV 7.4 fL      Platelets 233 10*3/mm3     Narrative:      The previously reported component NRBC is no longer being reported. Previous result was 0.0 /100 WBC (Reference Range: 0.0-0.2 /100 WBC) on 6/5/2022 at 0620 EDT.    Scan Slide [[id number]] Collected: 06/05/22 0528    Specimen: Blood Updated: 06/05/22 0735     Scan Slide --     Comment: See Manual Differential Results       aPTT [[id number]]  (Abnormal) Collected: 06/05/22 0528    Specimen: Blood Updated: 06/05/22 0638     PTT 44.9 seconds     Basic Metabolic Panel [[id number]]  (Abnormal) Collected: 06/05/22 0528    Specimen: Blood Updated: 06/05/22 0635     Glucose 92 mg/dL      BUN 17 mg/dL      Creatinine 1.00 mg/dL      Sodium 145 mmol/L      Potassium 3.7 mmol/L      Chloride 111 mmol/L      CO2 24.0 mmol/L      Calcium 8.9 mg/dL      BUN/Creatinine Ratio 17.0     Anion Gap 10.0 mmol/L      eGFR 81.5 mL/min/1.73      Comment: National Kidney Foundation and American Society of Nephrology (ASN) Task Force recommended calculation based on the Chronic Kidney Disease Epidemiology Collaboration (CKD-EPI) equation refit without adjustment for race.       Narrative:      GFR Normal >60  Chronic Kidney Disease <60  Kidney Failure <15      Haptoglobin [[id number]]  (Abnormal) Collected: 06/04/22 1100    Specimen: Blood Updated: 06/04/22 1958     Haptoglobin 317 mg/dL     Blood Culture - Blood, Blood, Venous Line [[id number]]  (Normal) Collected: 06/02/22 1650    Specimen: Blood, Venous Line Updated: 06/04/22 1701     Blood Culture No growth at 2 days    Blood Culture - Blood, Blood, Venous Line [[id number]]  (Normal) Collected: 06/02/22 1553    Specimen: Blood, Venous Line Updated: 06/04/22 1602     Blood Culture No growth at 2 days    Extra Tubes [[id number]] Collected: 06/04/22 1309    Specimen: Blood, Venous Line Updated: 06/04/22 1417    Narrative:      The following orders were created for panel order  Extra Tubes.  Procedure                               Abnormality         Status                     ---------                               -----------         ------                     Green Top (Gel)[[id number]]                                  Final result                 Please view results for these tests on the individual orders.    Green Top (Gel) [[id number]] Collected: 06/04/22 1309    Specimen: Blood Updated: 06/04/22 1417     Extra Tube Hold for add-ons.     Comment: Auto resulted.       aPTT [[id number]]  (Normal) Collected: 06/04/22 1309    Specimen: Blood Updated: 06/04/22 1331     PTT 66.3 seconds           PENDING RESULTS: SPEP    IMAGING REVIEWED:  XR Knee 1 or 2 View Right    Result Date: 6/3/2022  Negative for acute osseous abnormality.  Small joint effusion.  Electronically Signed By-Orlando Mckee MD On:6/3/2022 1:18 PM This report was finalized on [id number] by  Orlando Mckee MD.      I have reviewed the patient's labs, imaging, reports, and other clinician documentation.    Assessment & Plan   ASSESSMENT:  1. Acute right lower lobe PE-likely provoked by recurrent or persistent COVID-19 infection and no indication for thrombophilia work-up at present.  D-dimer is high.  On heparin drip.  BLE venous Dopplers negative for DVT.  We will transition to Eliquis on discharge.  2. Stage III follicular NHL- in complete remission and on maintenance rituximab, last dosed 5/19 as outpatient. Current CTs show continued complete remission and rituximab suppresses B-cell function long-term causing immune compromise.  Therefore it may be prudent to consider stopping maintenance therapy early due to recurrent infections.  3. Anemia/iron deficiency anemia/vitamin B12 deficiency- he has 3 remaining weekly vitamin B12 injections with the next due 6/9.  Continue daily oral iron and treat iron deficit with Ferrlecit 250 mg IV x4.  Stool heme in past has been negative.  No hemolysis or hematuria.  On Pepcid. If he  remains anemic after iron and B12 replacement, he may need GI work-up performed prior to treatment for anemia secondary to CKD.  4. Recurrent COVID-19 infection-hospitalized February 2022 with COVID-19 PNA.  No PNA on CT or CXR.  Has had recent diarrhea.  No fever. Per primary team.   5. Enteritis/colitis versus shock bowel-he did have some diarrhea the a.m. of admission and it has now resolved.  On cefepime and Pepcid.  Per primary team.  6. CKD stage III-managed by Dr. Castillo as outpatient.        PLAN  1. We will follow CBC and transfuse as needed hemoglobin less than 7.    2. Continue daily ferrous sulfate and weekly vitamin B12 injections (due 6/9).  3. Continue heparin drip and start Eliquis at discharge.  4. Consider stopping rituximab maintenance.  5. Ferrlecit 250 mg IV x4, day 2/4  6. We will plan for outpatient GI work-up.  7. CBC later this week to follow hemoglobin.     Electronically signed by RAJNI Beavers, 06/05/22, 12:51 PM EDT.          I have personally performed a face-to-face diagnostic evaluation on this patient via telehealth.  I have discussed the case with Blanca Delgado NP, have edited/reviewed the note, and agree with the care plan.  The patient is denying any bleeding.  On examination, he does have some upper extremity ecchymoses.  Hemoglobin has dropped some.  He is ready for discharge.  Will change to Eliquis and arrange for outpatient CBC in 1 week.    I discussed the patients findings and my recommendations with patient.    Electronically signed by Madi Pringle MD, 06/05/22, 5:15 PM EDT.

## 2022-06-05 NOTE — PLAN OF CARE
Problem: Adult Inpatient Plan of Care  Goal: Plan of Care Review  Outcome: Ongoing, Progressing  Flowsheets (Taken 6/5/2022 1527)  Outcome Evaluation: Pt to discharge home.  deaccessed right sc port.  IV d/c/d intact. Verbalized understanding of d/c instructions. medications from pharm  given to pt wife. Pt will d/c home.   Goal Outcome Evaluation:              Outcome Evaluation: Pt to discharge home.  deaccessed right sc port.  IV d/c/d intact. Verbalized understanding of d/c instructions. medications from pharm  given to pt wife. Pt will d/c home.

## 2022-06-05 NOTE — PLAN OF CARE
Goal Outcome Evaluation:     Outcome Evaluation  Pt is a 72 y/o male who presents to [facility] following  weakness and lightheadedness w/ hypotension resulting in a fall and landing on his right knee. Pt was diagnosed with COVID-19 in Feb. and COVID test was positive this admission, but is asymptomatic and unsure if it is a residual positive test from February per family medicine. Pt with pulmonary emboli and was started on heparin 6/2. Pt has follicular non-Hodgkin's lymphoma stage III and is being seen by oncology. X-ray of R knee (-). Pt reporting symptoms have improved significantly. His ROM has improved with full knee ext. and knee flexion is WFL. Pt reports minimal increased discomfort with WB. Tenderness to palpation at the medial epicondyle. At [facility] pt lives with his spouse in a [facility] w/ one step to enter. He was working and driving. Pt reports no other falls in the past 6 months. At this time pt is independent w/ bed mobility, transfers and ambulation x50ft w/out a RW. Pt denies lightheadedness and chest pain throughout. No LOB noted during ambulation. Pt has good support from spouse and family. BP WNL prior to and following PT evaluation. Pt is not at a falls risk at this time. PT will complete the orders as he is independent w/ mobility and is able to transfer/ambulate with nursing staff.

## 2022-06-06 ENCOUNTER — READMISSION MANAGEMENT (OUTPATIENT)
Dept: CALL CENTER | Facility: HOSPITAL | Age: 72
End: 2022-06-06

## 2022-06-06 NOTE — OUTREACH NOTE
COVID-19 Week 1 Survey    Flowsheet Row Responses   [facility] from? [facility]   Does the patient have one of the following disease processes/diagnoses(primary or secondary)? COVID-19   COVID-19 underlying condition? None   Call Number Call 1   Week 1 Call successful? No   Discharge diagnosis fall, hypotension, COVID-19, pulmonary embolism, rollicular lymphoma in remission          PAUL RIBERA - Registered Nurse

## 2022-06-06 NOTE — CASE MANAGEMENT/SOCIAL WORK
Case Management Discharge Note      Final Note: Home         Selected Continued Care - Discharged on 6/5/2022 Admission date: 6/2/2022 - Discharge disposition: Home or Self Care         Transportation Services  Private: Car    Final Discharge Disposition Code: 01 - home or self-care

## 2022-06-06 NOTE — OUTREACH NOTE
Prep Survey    Flowsheet Row Responses   [facility] from? [facility]   Is LACE score < 7 ? No   Emergency Room discharge w/ pulse ox? No   Eligibility Readm Mgmt   Discharge diagnosis fall, hypotension, COVID-19, pulmonary embolism, rollicular lymphoma in remission   Does the patient have one of the following disease processes/diagnoses(primary or secondary)? COVID-19   Does the patient have Home health ordered? No   Is there a DME ordered? No   Prep survey completed? Yes          VICTORIANO FRANCIS - Registered Nurse

## 2022-06-07 ENCOUNTER — READMISSION MANAGEMENT (OUTPATIENT)
Dept: CALL CENTER | Facility: HOSPITAL | Age: 72
End: 2022-06-07

## 2022-06-07 LAB
BACTERIA SPEC AEROBE CULT: NORMAL
BACTERIA SPEC AEROBE CULT: NORMAL

## 2022-06-07 NOTE — OUTREACH NOTE
COVID-19 Week 1 Survey    Flowsheet Row Responses   [facility] [place] [facility]   Does the patient have one of the following disease processes/diagnoses(primary or secondary)? COVID-19   COVID-19 underlying condition? None   Call Number Call 2   Week 1 Call successful? Yes   Call start time [phone]   Call end time [phone]   Discharge diagnosis fall, hypotension, COVID-19, pulmonary embolism, rollicular lymphoma in remission   Meds reviewed with patient/caregiver? Yes   Is the patient having any side effects they believe may be caused by any medication additions or changes? No   Does the patient have all medications ordered at discharge? Yes   Is the patient taking all medications as directed (includes completed medication regime)? Yes   Does the patient have a primary care provider?  Yes   Comments regarding PCP 7/11/22   Does the patient have an appointment with their PCP or specialist within 7 days of discharge? Greater than 7 days   What is preventing the patient from scheduling follow up appointments within 7 days of discharge? --  [Pt will be on vacation. He will f/u after returning home. ]   Nursing Interventions Verified appointment date/time/provider   Has the patient kept scheduled appointments due by today? N/A   Psychosocial issues? No   Did the patient receive a copy of their discharge instructions? Yes   Did the patient receive a copy of COVID-19 specific instructions? Yes   Nursing interventions Reviewed instructions with patient   What is the patient's perception of their health status since discharge? Improving   Does the patient have any of the following symptoms? None   Nursing Interventions Nurse provided patient education   Pulse Ox monitoring Intermittent   O2 Sat comments 96-97% on RA    Is the patient/caregiver able to teach back steps to recovery at home? Rest and rebuild strength, gradually increase activity, Eat a well-balance diet   COVID-19 call completed? Yes   Revoked No  further contact(revokes)-requires comment   Is the patient interested in additional calls from an ambulatory ?  NOTE:  applies to high risk patients requiring additional follow-up. No   Graduated/Revoked comments Pt returned to work today. He has no symptoms.           PABLO RIBERA - Registered Nurse

## 2023-05-12 ENCOUNTER — TRANSCRIBE ORDERS (OUTPATIENT)
Dept: ADMINISTRATIVE | Facility: HOSPITAL | Age: 73
End: 2023-05-12
Payer: COMMERCIAL

## 2023-05-12 DIAGNOSIS — R05.3 CHRONIC COUGH: Primary | ICD-10-CM

## 2023-05-25 ENCOUNTER — ON CAMPUS - OUTPATIENT (OUTPATIENT)
Dept: URBAN - METROPOLITAN AREA HOSPITAL 2 | Facility: HOSPITAL | Age: 73
End: 2023-05-25

## 2023-05-25 ENCOUNTER — OFFICE (OUTPATIENT)
Dept: URBAN - METROPOLITAN AREA PATHOLOGY 4 | Facility: PATHOLOGY | Age: 73
End: 2023-05-25
Payer: COMMERCIAL

## 2023-05-25 ENCOUNTER — OFFICE (OUTPATIENT)
Dept: URBAN - METROPOLITAN AREA PATHOLOGY 4 | Facility: PATHOLOGY | Age: 73
End: 2023-05-25

## 2023-05-25 VITALS
HEART RATE: 68 BPM | HEART RATE: 101 BPM | TEMPERATURE: 97.8 F | OXYGEN SATURATION: 96 % | SYSTOLIC BLOOD PRESSURE: 118 MMHG | SYSTOLIC BLOOD PRESSURE: 103 MMHG | WEIGHT: 159 LBS | DIASTOLIC BLOOD PRESSURE: 81 MMHG | SYSTOLIC BLOOD PRESSURE: 148 MMHG | DIASTOLIC BLOOD PRESSURE: 66 MMHG | OXYGEN SATURATION: 99 % | SYSTOLIC BLOOD PRESSURE: 116 MMHG | HEART RATE: 64 BPM | SYSTOLIC BLOOD PRESSURE: 100 MMHG | SYSTOLIC BLOOD PRESSURE: 107 MMHG | DIASTOLIC BLOOD PRESSURE: 64 MMHG | DIASTOLIC BLOOD PRESSURE: 93 MMHG | DIASTOLIC BLOOD PRESSURE: 74 MMHG | HEIGHT: 71 IN | HEART RATE: 62 BPM | DIASTOLIC BLOOD PRESSURE: 77 MMHG | HEART RATE: 65 BPM | HEART RATE: 88 BPM | SYSTOLIC BLOOD PRESSURE: 136 MMHG | HEART RATE: 99 BPM | OXYGEN SATURATION: 100 % | RESPIRATION RATE: 17 BRPM | RESPIRATION RATE: 20 BRPM | DIASTOLIC BLOOD PRESSURE: 70 MMHG | SYSTOLIC BLOOD PRESSURE: 109 MMHG | RESPIRATION RATE: 23 BRPM | RESPIRATION RATE: 18 BRPM | DIASTOLIC BLOOD PRESSURE: 71 MMHG | RESPIRATION RATE: 15 BRPM | HEART RATE: 67 BPM

## 2023-05-25 DIAGNOSIS — D12.2 BENIGN NEOPLASM OF ASCENDING COLON: ICD-10-CM

## 2023-05-25 DIAGNOSIS — K29.80 DUODENITIS WITHOUT BLEEDING: ICD-10-CM

## 2023-05-25 DIAGNOSIS — K64.2 THIRD DEGREE HEMORRHOIDS: ICD-10-CM

## 2023-05-25 DIAGNOSIS — K29.50 UNSPECIFIED CHRONIC GASTRITIS WITHOUT BLEEDING: ICD-10-CM

## 2023-05-25 DIAGNOSIS — K44.9 DIAPHRAGMATIC HERNIA WITHOUT OBSTRUCTION OR GANGRENE: ICD-10-CM

## 2023-05-25 DIAGNOSIS — K29.70 GASTRITIS, UNSPECIFIED, WITHOUT BLEEDING: ICD-10-CM

## 2023-05-25 DIAGNOSIS — D50.9 IRON DEFICIENCY ANEMIA, UNSPECIFIED: ICD-10-CM

## 2023-05-25 PROBLEM — K31.89 OTHER DISEASES OF STOMACH AND DUODENUM: Status: ACTIVE | Noted: 2023-05-25

## 2023-05-25 PROBLEM — K63.5 POLYP OF COLON: Status: ACTIVE | Noted: 2023-05-25

## 2023-05-25 LAB
GI HISTOLOGY: A. SELECT: (no result)
GI HISTOLOGY: B. SELECT: (no result)
GI HISTOLOGY: C. UNSPECIFIED: (no result)
GI HISTOLOGY: PDF REPORT: (no result)

## 2023-05-25 PROCEDURE — 88305 TISSUE EXAM BY PATHOLOGIST: CPT | Mod: 26 | Performed by: INTERNAL MEDICINE

## 2023-05-25 PROCEDURE — 45385 COLONOSCOPY W/LESION REMOVAL: CPT | Performed by: INTERNAL MEDICINE

## 2023-05-25 PROCEDURE — 43239 EGD BIOPSY SINGLE/MULTIPLE: CPT | Performed by: INTERNAL MEDICINE

## 2023-05-25 PROCEDURE — 88342 IMHCHEM/IMCYTCHM 1ST ANTB: CPT | Mod: 26 | Performed by: INTERNAL MEDICINE

## 2024-01-19 ENCOUNTER — TELEPHONE (OUTPATIENT)
Dept: ONCOLOGY | Facility: CLINIC | Age: 74
End: 2024-01-19
Payer: MEDICAID

## 2024-02-22 NOTE — PROGRESS NOTES
HEMATOLOGY ONCOLOGY OUTPATIENT CONSULTATION       Patient name: Iván Fowler  : 1950  MRN: [id number]  Primary Care Physician: Mat Aiken MD  Referring Physician: Madi Pringle MD  Reason For Consult: History of follicular lymphoma.      History of Present Illness:  Patient is a 73 y.o.     2024: Mr. Fowler was in the office for the first time to transfer his care.  He was first diagnosed with a follicular lymphoma, stage III, in 2020.  He presented to his primary care physician with inguinal pain.  This led to a scan of the abdomen and pelvis that revealed lymphadenopathy in the left inguinal region with one of the largest lymph nodes measuring 4.1 x 2.6 cm.  There was also pelvic adenopathy on the left with one of the larger lymph nodes measuring 2.9 x 2.7 cm.  A large conglomerate of lymph nodes in the left obturator region that measured up to 4.8 x 3.5 cm was also identified.  Lymphadenopathy extended to the retroperitoneum, within the para-aortic region on the left.  He underwent a ultrasound-guided lymph node biopsy in 2020 that reported follicular lymphoma, low-grade.  He had no B symptoms.  A bone marrow biopsy revealed that no evidence of active disease and a PET scan confirmed the presence of multiple enlarged and fluorodeoxyglucose avid lymph nodes in the right neck, at the left subpectoral station, in the bilateral axillary regions, at the anterior mediastinum, the retroperitoneum and along the left iliac chain and left inguinal region.  On 2020 he received the first cycle of rituximab and Bendamustine.  In 2021 he received 1/8 cycle of the same chemotherapy.  There was evidence of response on the scans.  He was started on maintenance rituximab in August of that same year and continued the same treatment through 2022.  At that time, in the setting of SARS COV 2 infection and lung involvement, with persistently  positive testing, the medication was discontinued.  The lymphadenopathy had decreased in size and was stable.  In December 2023 the scans revealed an increase in the number and size of small lymph nodes within the axillary spaces bilaterally.  He is in the office without new symptoms.  He has been active and working full-time.  He is eating well and his weight has been stable.  He has been afebrile and without nocturnal diaphoresis.  On exam no palpable adenopathy in the neck or axillary spaces.  The lungs are clear bilaterally and the heart regular.  Abdomen without hepatomegaly or splenomegaly.  A decision was made to continue to follow with new scans in April 2024 and to see me with the results.    Past Medical History:   Diagnosis Date    Hyperlipidemia     Hypertension      To follow-up with new scans in April 2024 and to see me with the results.  Past Surgical History:   Procedure Laterality Date    APPENDECTOMY      US GUIDED LYMPH NODE BIOPSY  9/25/2020    VASECTOMY Bilateral        Current Outpatient Medications:     acetaminophen (TYLENOL) 325 MG tablet, Take 2 tablets by mouth Every 4 (Four) Hours As Needed for Mild Pain ., Disp: , Rfl:     fenofibrate (TRICOR) 145 MG tablet, Take 1 tablet by mouth Daily., Disp: , Rfl:     ferrous sulfate 324 (65 Fe) MG tablet delayed-release EC tablet, Take 1 tablet by mouth Daily With Breakfast., Disp: 90 tablet, Rfl: 1    hydrALAZINE (APRESOLINE) 25 MG tablet, Take 1 tablet by mouth 2 (Two) Times a Day., Disp: , Rfl:     montelukast (SINGULAIR) 10 MG tablet, Take 1 tablet by mouth Daily., Disp: , Rfl:     omeprazole (priLOSEC) 20 MG capsule, Take 1 capsule by mouth Daily., Disp: , Rfl:     tamsulosin (FLOMAX) 0.4 MG capsule 24 hr capsule, Take 1 capsule by mouth Every 12 (Twelve) Hours., Disp: , Rfl:     vitamin D3 125 MCG (5000 UT) capsule capsule, Take 1 capsule by mouth 1 (One) Time. Once monthly, Disp: , Rfl:     finasteride (PROSCAR) 5 MG tablet, Take 5 mg by  mouth Every Night. (Patient not taking: Reported on 2/23/2024), Disp: , Rfl:   No current facility-administered medications for this visit.    Facility-Administered Medications Ordered in Other Visits:     heparin injection 500 Units, 500 Units, Intravenous, Isabelle LONDON Alfonso, MD, 500 Units at 02/23/24 0907    sodium chloride 0.9 % flush 20 mL, 20 mL, Intravenous, Isabelle LONDON Alfonso, MD, 20 mL at 02/23/24 0905    No Known Allergies    Family History   Problem Relation Age of Onset    Cerebral aneurysm Mother     Heart disease Father     Lymphoma Sister 73        non-Hogdkin.     Cancer-related family history includes Lymphoma (age of onset: 73) in his sister.    Social History     Tobacco Use    Smoking status: Never    Smokeless tobacco: Never   Vaping Use    Vaping Use: Never used   Substance Use Topics    Alcohol use: Yes     Comment: Occasional    Drug use: Never     Social History     Social History Narrative    Not on file     ROS:   Review of Systems   Constitutional:  Negative for activity change, appetite change, chills, diaphoresis, fatigue, fever and unexpected weight change.   HENT:  Negative for congestion, dental problem, drooling, ear discharge, ear pain, facial swelling, hearing loss, mouth sores, nosebleeds, postnasal drip, rhinorrhea, sinus pressure, sinus pain, sneezing, sore throat, tinnitus, trouble swallowing and voice change.    Eyes:  Negative for photophobia, pain, discharge, redness, itching and visual disturbance.   Respiratory:  Negative for apnea, cough, choking, chest tightness, shortness of breath, wheezing and stridor.    Cardiovascular:  Negative for chest pain, palpitations and leg swelling.   Gastrointestinal:  Negative for abdominal distention, abdominal pain, anal bleeding, blood in stool, constipation, diarrhea, nausea, rectal pain and vomiting.   Endocrine: Negative for cold intolerance, heat intolerance, polydipsia and polyuria.   Genitourinary:  Negative for decreased  "urine volume, difficulty urinating, dysuria, flank pain, frequency, genital sores, hematuria and urgency.   Musculoskeletal:  Negative for arthralgias, back pain, gait problem, joint swelling, myalgias, neck pain and neck stiffness.   Skin:  Negative for color change, pallor and rash.   Neurological:  Negative for dizziness, tremors, seizures, syncope, facial asymmetry, speech difficulty, weakness, light-headedness, numbness and headaches.   Hematological:  Negative for adenopathy. Does not bruise/bleed easily.   Psychiatric/Behavioral:  Negative for agitation, behavioral problems, confusion, decreased concentration, hallucinations, self-injury, sleep disturbance and suicidal ideas. The patient is not nervous/anxious.        Objective:    Vital Signs:  Vitals:    02/23/24 0911   BP: 146/81   Pulse: 70   Temp: 97.9 °F (36.6 °C)   TempSrc: Oral   SpO2: 98%   Weight: 79.4 kg (175 lb)   Height: 180.3 cm (71\")   PainSc: 0-No pain     Body mass index is 24.41 kg/m².    ECOG  (0) Fully active, able to carry on all predisease performance without restriction    Physical Exam:   Physical Exam  Constitutional:       General: He is not in acute distress.     Appearance: He is not ill-appearing, toxic-appearing or diaphoretic.   HENT:      Head: Normocephalic and atraumatic.      Right Ear: External ear normal.      Left Ear: External ear normal.      Nose: Nose normal.      Mouth/Throat:      Mouth: Mucous membranes are moist.      Pharynx: Oropharynx is clear.   Eyes:      General: No scleral icterus.        Right eye: No discharge.         Left eye: No discharge.      Conjunctiva/sclera: Conjunctivae normal.      Pupils: Pupils are equal, round, and reactive to light.   Cardiovascular:      Rate and Rhythm: Normal rate and regular rhythm.      Pulses: Normal pulses.      Heart sounds: Normal heart sounds. No murmur heard.     No friction rub. No gallop.   Pulmonary:      Effort: No respiratory distress.      Breath sounds: No " stridor. No wheezing, rhonchi or rales.   Chest:      Chest wall: No tenderness.   Abdominal:      General: Abdomen is flat. Bowel sounds are normal. There is no distension.      Palpations: Abdomen is soft. There is no mass.      Tenderness: There is no abdominal tenderness. There is no right CVA tenderness, left CVA tenderness, guarding or rebound.   Musculoskeletal:         General: No swelling, tenderness, deformity or signs of injury.      Cervical back: No rigidity.      Right lower leg: No edema.      Left lower leg: No edema.   Lymphadenopathy:      Cervical: No cervical adenopathy.   Skin:     General: Skin is warm and dry.      Coloration: Skin is not jaundiced.      Findings: No bruising or rash.   Neurological:      General: No focal deficit present.      Mental Status: He is alert and oriented to person, place, and time.      Gait: Gait normal.   Psychiatric:         Mood and Affect: Mood normal.         Behavior: Behavior normal.         Thought Content: Thought content normal.         Judgment: Judgment normal.     AIDAN Walton MD performed the physical exam on 2/23/2024 as documented above.    Lab Results - Last 18 Months   Lab Units 02/23/24  0905   WBC 10*3/mm3 5.46   HEMOGLOBIN g/dL 12.2*   HEMATOCRIT % 37.6   PLATELETS 10*3/mm3 186   MCV fL 88.7     Lab Results   Component Value Date    GLUCOSE 92 06/05/2022    BUN 17 06/05/2022    CREATININE 1.00 06/05/2022    EGFRIFNONA 73 02/16/2022    BCR 17.0 06/05/2022    K 3.7 06/05/2022    CO2 24.0 06/05/2022    CALCIUM 8.9 06/05/2022    ALBUMIN 3.80 06/02/2022    AST 18 06/02/2022    ALT 12 06/02/2022     Lab Results   Component Value Date    IRON 42 (L) 06/03/2022    TIBC 367 06/03/2022    FERRITIN 283.60 06/03/2022     Uric Acid   Date Value Ref Range Status   12/06/2021 5.7 3.4 - 7.0 mg/dL Final     Lab Results   Component Value Date    SALAZAR Negative 12/06/2021     Lab Results   Component Value Date    HAPTOGLOBIN 317 (H) 06/04/2022     Lab  Results   Component Value Date    PTT >139.0 (C) 06/05/2022    INR 1.06 06/02/2022     Assessment & Plan     1.  Low-grade follicular lymphoma status post 8 cycles of rituximab and bendamustine followed by maintenance rituximab.  Reviewed the recent scans.  Discussed with him.  The possibility of active disease exists although it is unlikely.  However, follow-up with new scans is reasonable and he will see me in approximately 2 months with new scans.  Reviewed the laboratory exams and discussed with him.  2.  Reviewed the records from [facility] as well as from . Asked me draws office.  Reviewed the records from [facility].  Reviewed all imaging studies available and all laboratory exams.  3.  See me in April 2024 with new scans.    Gregg Walton MD on 2/23/2024 at 9:50 AM.

## 2024-02-23 ENCOUNTER — HOSPITAL ENCOUNTER (OUTPATIENT)
Dept: ONCOLOGY | Facility: HOSPITAL | Age: 74
Discharge: HOME | End: 2024-02-23
Admitting: INTERNAL MEDICINE
Payer: COMMERCIAL

## 2024-02-23 ENCOUNTER — CONSULT (OUTPATIENT)
Dept: ONCOLOGY | Facility: CLINIC | Age: 74
End: 2024-02-23
Payer: COMMERCIAL

## 2024-02-23 VITALS
HEIGHT: 71 IN | HEART RATE: 70 BPM | BODY MASS INDEX: 24.5 KG/M2 | OXYGEN SATURATION: 98 % | DIASTOLIC BLOOD PRESSURE: 81 MMHG | WEIGHT: 175 LBS | TEMPERATURE: 97.9 F | SYSTOLIC BLOOD PRESSURE: 146 MMHG

## 2024-02-23 DIAGNOSIS — Z45.2 ENCOUNTER FOR CARE RELATED TO VASCULAR ACCESS PORT: Primary | ICD-10-CM

## 2024-02-23 DIAGNOSIS — C82.90 FOLLICULAR NON-HODGKIN'S LYMPHOMA: ICD-10-CM

## 2024-02-23 DIAGNOSIS — C82.90 FOLLICULAR NON-HODGKIN'S LYMPHOMA: Primary | ICD-10-CM

## 2024-02-23 LAB
BASOPHILS # BLD AUTO: 0.01 10*3/MM3 (ref 0–0.2)
BASOPHILS NFR BLD AUTO: 0.2 % (ref 0–1.5)
DEPRECATED RDW RBC AUTO: 45.6 FL (ref 37–54)
EOSINOPHIL # BLD AUTO: 0 10*3/MM3 (ref 0–0.4)
EOSINOPHIL NFR BLD AUTO: 0 % (ref 0.3–6.2)
ERYTHROCYTE [DISTWIDTH] IN BLOOD BY AUTOMATED COUNT: 14.4 % (ref 12.3–15.4)
HCT VFR BLD AUTO: 37.6 % (ref 37.5–51)
HGB BLD-MCNC: 12.2 G/DL (ref 13–17.7)
LYMPHOCYTES # BLD AUTO: 2.11 10*3/MM3 (ref 0.7–3.1)
LYMPHOCYTES NFR BLD AUTO: 38.6 % (ref 19.6–45.3)
MCH RBC QN AUTO: 28.8 PG (ref 26.6–33)
MCHC RBC AUTO-ENTMCNC: 32.4 G/DL (ref 31.5–35.7)
MCV RBC AUTO: 88.7 FL (ref 79–97)
MONOCYTES # BLD AUTO: 0.72 10*3/MM3 (ref 0.1–0.9)
MONOCYTES NFR BLD AUTO: 13.2 % (ref 5–12)
NEUTROPHILS NFR BLD AUTO: 2.62 10*3/MM3 (ref 1.7–7)
NEUTROPHILS NFR BLD AUTO: 48 % (ref 42.7–76)
PLATELET # BLD AUTO: 186 10*3/MM3 (ref 140–450)
PMV BLD AUTO: 10 FL (ref 6–12)
RBC # BLD AUTO: 4.24 10*6/MM3 (ref 4.14–5.8)
WBC NRBC COR # BLD AUTO: 5.46 10*3/MM3 (ref 3.4–10.8)

## 2024-02-23 PROCEDURE — 36591 DRAW BLOOD OFF VENOUS DEVICE: CPT

## 2024-02-23 PROCEDURE — 25010000002 HEPARIN LOCK FLUSH PER 10 UNITS: Performed by: INTERNAL MEDICINE

## 2024-02-23 PROCEDURE — 85025 COMPLETE CBC W/AUTO DIFF WBC: CPT | Performed by: INTERNAL MEDICINE

## 2024-02-23 RX ORDER — SODIUM CHLORIDE 0.9 % (FLUSH) 0.9 %
20 SYRINGE (ML) INJECTION AS NEEDED
Status: DISCONTINUED | OUTPATIENT
Start: 2024-02-23 | End: 2024-02-24 | Stop reason: HOSPADM

## 2024-02-23 RX ORDER — HEPARIN SODIUM (PORCINE) LOCK FLUSH IV SOLN 100 UNIT/ML 100 UNIT/ML
500 SOLUTION INTRAVENOUS AS NEEDED
Status: DISCONTINUED | OUTPATIENT
Start: 2024-02-23 | End: 2024-02-24 | Stop reason: HOSPADM

## 2024-02-23 RX ORDER — HEPARIN SODIUM (PORCINE) LOCK FLUSH IV SOLN 100 UNIT/ML 100 UNIT/ML
500 SOLUTION INTRAVENOUS AS NEEDED
OUTPATIENT
Start: 2024-02-23

## 2024-02-23 RX ORDER — TAMSULOSIN HYDROCHLORIDE 0.4 MG/1
1 CAPSULE ORAL EVERY 12 HOURS SCHEDULED
COMMUNITY
Start: 2024-01-18

## 2024-02-23 RX ORDER — OMEPRAZOLE 20 MG/1
1 CAPSULE, DELAYED RELEASE ORAL DAILY
COMMUNITY
Start: 2023-12-20

## 2024-02-23 RX ORDER — SODIUM CHLORIDE 0.9 % (FLUSH) 0.9 %
20 SYRINGE (ML) INJECTION AS NEEDED
OUTPATIENT
Start: 2024-02-23

## 2024-02-23 RX ORDER — MONTELUKAST SODIUM 10 MG/1
1 TABLET ORAL DAILY
COMMUNITY
Start: 2023-12-13

## 2024-02-23 RX ADMIN — HEPARIN 500 UNITS: 100 SYRINGE at 09:07

## 2024-02-23 RX ADMIN — Medication 20 ML: at 09:05

## 2024-02-23 NOTE — PROGRESS NOTES
Pt to the clinic for port flush and labs. Port accessed using sterile technique with positive blood return noted. 10cc of blood wasted prior to obtaining lab specimen per orders. Port flushed with 20cc normal saline and 500 units heparin then deaccessed. Pt tolerated well.  Pt sent to [facility] to be called for Dr. Walton appointment.

## 2024-02-23 NOTE — ADDENDUM NOTE
Encounter addended by: Tereza Green RN on: 2/23/2024 9:16 AM   Actions taken: Specialty comments modified

## 2024-02-26 ENCOUNTER — PATIENT ROUNDING (BHMG ONLY) (OUTPATIENT)
Dept: ONCOLOGY | Facility: CLINIC | Age: 74
End: 2024-02-26
Payer: COMMERCIAL

## 2024-02-26 NOTE — PROGRESS NOTES
February 26, 2024    Hello, may I speak with Iván Fowler?    My name is Cely Donohue      I am  with [facility]  [address] [place] [address] [phone].    Before we get started may I verify your date of birth? 1950    I am calling to officially welcome you to our practice and ask about your recent visit. Is this a good time to talk? no    Tell me about your visit with us. What things went well?  A My Chart message was sent to the patient.         We're always looking for ways to make our patients' experiences even better. Do you have recommendations on ways we may improve?  no    Overall were you satisfied with your first visit to our practice? yes       I appreciate you taking the time to speak with me today. Is there anything else I can do for you? no      Thank you, and have a great day.

## 2024-03-11 ENCOUNTER — HOSPITAL ENCOUNTER (OUTPATIENT)
Dept: PET IMAGING | Facility: HOSPITAL | Age: 74
Discharge: HOME | End: 2024-03-11
Admitting: INTERNAL MEDICINE
Payer: COMMERCIAL

## 2024-03-11 DIAGNOSIS — C82.90 FOLLICULAR NON-HODGKIN'S LYMPHOMA: ICD-10-CM

## 2024-03-11 LAB
CREAT BLDA-MCNC: 1.3 MG/DL (ref 0.6–1.3)
EGFRCR SERPLBLD CKD-EPI 2021: 58.7 ML/MIN/1.73

## 2024-03-11 PROCEDURE — 82565 ASSAY OF CREATININE: CPT

## 2024-03-11 PROCEDURE — 74177 CT ABD & PELVIS W/CONTRAST: CPT

## 2024-03-11 PROCEDURE — 25510000001 IOPAMIDOL PER 1 ML: Performed by: INTERNAL MEDICINE

## 2024-03-11 PROCEDURE — 71260 CT THORAX DX C+: CPT

## 2024-03-11 RX ADMIN — IOPAMIDOL 100 ML: 755 INJECTION, SOLUTION INTRAVENOUS at 14:51

## 2024-04-25 NOTE — PROGRESS NOTES
HEMATOLOGY ONCOLOGY OUTPATIENT FOLLOW-UP       Patient name: Iván Fowler  : 1950  MRN: [id number]  Primary Care Physician: Mat Aiken MD  Referring Physician: No ref. provider found  Reason For Consult: History of follicular lymphoma.      History of Present Illness:  Patient is a 73 y.o.     2024: Mr. Fowler was in the office for the first time to transfer his care.  He was first diagnosed with a follicular lymphoma, stage III, in 2020.  He presented to his primary care physician with inguinal pain.  This led to a scan of the abdomen and pelvis that revealed lymphadenopathy in the left inguinal region with one of the largest lymph nodes measuring 4.1 x 2.6 cm.  There was also pelvic adenopathy on the left with one of the larger lymph nodes measuring 2.9 x 2.7 cm.  A large conglomerate of lymph nodes in the left obturator region that measured up to 4.8 x 3.5 cm was also identified.  Lymphadenopathy extended to the retroperitoneum, within the para-aortic region on the left.  He underwent a ultrasound-guided lymph node biopsy in 2020 that reported follicular lymphoma, low-grade.  He had no B symptoms.  A bone marrow biopsy revealed that no evidence of active disease and a PET scan confirmed the presence of multiple enlarged and fluorodeoxyglucose avid lymph nodes in the right neck, at the left subpectoral station, in the bilateral axillary regions, at the anterior mediastinum, the retroperitoneum and along the left iliac chain and left inguinal region.  On 2020 he received the first cycle of rituximab and Bendamustine.  In 2021 he received 1/8 cycle of the same chemotherapy.  There was evidence of response on the scans.  He was started on maintenance rituximab in August of that same year and continued the same treatment through 2022.  At that time, in the setting of SARS COV 2 infection and lung involvement, with persistently  positive testing, the medication was discontinued.  The lymphadenopathy had decreased in size and was stable.  In December 2023 the scans revealed an increase in the number and size of small lymph nodes within the axillary spaces bilaterally.  He is in the office without new symptoms.  He has been active and working full-time.  He is eating well and his weight has been stable.  He has been afebrile and without nocturnal diaphoresis.  On exam no palpable adenopathy in the neck or axillary spaces.  The lungs are clear bilaterally and the heart regular.  Abdomen without hepatomegaly or splenomegaly.  A decision was made to continue to follow with new scans in April 2024 and to see me with the results.    4/26/2024: Entirely asymptomatic.  Active and without new limitations.  Eating well.  No weight loss.  No fevers.  Denies pain, cough or dyspnea.  No diarrhea or dysuria.  On exam alert, conversant and in no distress.  No jaundice.  Respirations unlabored and lungs clear.  Heart regular and abdomen soft.  No edema.  Laboratory exams reveal persistent anemia with a hemoglobin of 12.4 g/dL.  Mean corpuscular volume 89.7 fL.  White cell count and differential as well as platelet count unremarkable.  To continue observation only.  He will see me in approximately 4 months.  Laboratory exams then.  Continue port flush every 2 months.  Consider removing the port.    Past Medical History:   Diagnosis Date    Hyperlipidemia     Hypertension      To follow-up with new scans in April 2024 and to see me with the results.  Past Surgical History:   Procedure Laterality Date    APPENDECTOMY      US GUIDED LYMPH NODE BIOPSY  9/25/2020    VASECTOMY Bilateral        Current Outpatient Medications:     acetaminophen (TYLENOL) 325 MG tablet, Take 2 tablets by mouth Every 4 (Four) Hours As Needed for Mild Pain ., Disp: , Rfl:     fenofibrate (TRICOR) 145 MG tablet, Take 1 tablet by mouth Daily., Disp: , Rfl:     ferrous sulfate 324 (65 Fe)  MG tablet delayed-release EC tablet, Take 1 tablet by mouth Daily With Breakfast., Disp: 90 tablet, Rfl: 1    hydrALAZINE (APRESOLINE) 25 MG tablet, Take 1 tablet by mouth 2 (Two) Times a Day., Disp: , Rfl:     montelukast (SINGULAIR) 10 MG tablet, Take 1 tablet by mouth Daily., Disp: , Rfl:     omeprazole (priLOSEC) 20 MG capsule, Take 1 capsule by mouth Daily., Disp: , Rfl:     tamsulosin (FLOMAX) 0.4 MG capsule 24 hr capsule, Take 1 capsule by mouth Every 12 (Twelve) Hours., Disp: , Rfl:     vitamin D3 125 MCG (5000 UT) capsule capsule, Take 1 capsule by mouth 1 (One) Time. Once monthly, Disp: , Rfl:     finasteride (PROSCAR) 5 MG tablet, Take 5 mg by mouth Every Night. (Patient not taking: Reported on 2/23/2024), Disp: , Rfl:     No Known Allergies    Family History   Problem Relation Age of Onset    Cerebral aneurysm Mother     Heart disease Father     Lymphoma Sister 73        non-Hogdkin.     Cancer-related family history includes Lymphoma (age of onset: 73) in his sister.    Social History     Tobacco Use    Smoking status: Never    Smokeless tobacco: Never   Vaping Use    Vaping status: Never Used   Substance Use Topics    Alcohol use: Yes     Comment: Occasional    Drug use: Never     Social History     Social History Narrative    Not on file     ROS:   Review of Systems   Constitutional:  Negative for activity change, appetite change, chills, diaphoresis, fatigue, fever and unexpected weight change.   HENT:  Negative for congestion, dental problem, drooling, ear discharge, ear pain, facial swelling, hearing loss, mouth sores, nosebleeds, postnasal drip, rhinorrhea, sinus pressure, sinus pain, sneezing, sore throat, tinnitus, trouble swallowing and voice change.    Eyes:  Negative for photophobia, pain, discharge, redness, itching and visual disturbance.   Respiratory:  Negative for apnea, cough, choking, chest tightness, shortness of breath, wheezing and stridor.    Cardiovascular:  Negative for chest  "pain, palpitations and leg swelling.   Gastrointestinal:  Negative for abdominal distention, abdominal pain, anal bleeding, blood in stool, constipation, diarrhea, nausea, rectal pain and vomiting.   Endocrine: Negative for cold intolerance, heat intolerance, polydipsia and polyuria.   Genitourinary:  Negative for decreased urine volume, difficulty urinating, dysuria, flank pain, frequency, genital sores, hematuria and urgency.   Musculoskeletal:  Negative for arthralgias, back pain, gait problem, joint swelling, myalgias, neck pain and neck stiffness.   Skin:  Negative for color change, pallor and rash.   Neurological:  Negative for dizziness, tremors, seizures, syncope, facial asymmetry, speech difficulty, weakness, light-headedness, numbness and headaches.   Hematological:  Negative for adenopathy. Does not bruise/bleed easily.   Psychiatric/Behavioral:  Negative for agitation, behavioral problems, confusion, decreased concentration, hallucinations, self-injury, sleep disturbance and suicidal ideas. The patient is not nervous/anxious.      Objective:    Vital Signs:  Vitals:    04/26/24 0835   BP: 146/73   Pulse: 75   Temp: 97.9 °F (36.6 °C)   TempSrc: Oral   SpO2: 97%   Weight: 81.7 kg (180 lb 3.2 oz)   Height: 180.3 cm (71\")   PainSc: 0-No pain     Body mass index is 25.13 kg/m².    ECOG  (0) Fully active, able to carry on all predisease performance without restriction    Physical Exam:   Physical Exam  Constitutional:       General: He is not in acute distress.     Appearance: He is not ill-appearing, toxic-appearing or diaphoretic.   HENT:      Head: Normocephalic and atraumatic.      Right Ear: External ear normal.      Left Ear: External ear normal.      Nose: Nose normal.      Mouth/Throat:      Mouth: Mucous membranes are moist.      Pharynx: Oropharynx is clear.   Eyes:      General: No scleral icterus.        Right eye: No discharge.         Left eye: No discharge.      Conjunctiva/sclera: Conjunctivae " normal.      Pupils: Pupils are equal, round, and reactive to light.   Cardiovascular:      Rate and Rhythm: Normal rate and regular rhythm.      Pulses: Normal pulses.      Heart sounds: Normal heart sounds. No murmur heard.     No friction rub. No gallop.   Pulmonary:      Effort: No respiratory distress.      Breath sounds: No stridor. No wheezing, rhonchi or rales.   Chest:      Chest wall: No tenderness.   Abdominal:      General: Abdomen is flat. Bowel sounds are normal. There is no distension.      Palpations: Abdomen is soft. There is no mass.      Tenderness: There is no abdominal tenderness. There is no right CVA tenderness, left CVA tenderness, guarding or rebound.   Musculoskeletal:         General: No swelling, tenderness, deformity or signs of injury.      Cervical back: No rigidity.      Right lower leg: No edema.      Left lower leg: No edema.   Lymphadenopathy:      Cervical: No cervical adenopathy.   Skin:     General: Skin is warm and dry.      Coloration: Skin is not jaundiced.      Findings: No bruising or rash.   Neurological:      General: No focal deficit present.      Mental Status: He is alert and oriented to person, place, and time.      Gait: Gait normal.   Psychiatric:         Mood and Affect: Mood normal.         Behavior: Behavior normal.         Thought Content: Thought content normal.         Judgment: Judgment normal.     AIDAN Walton MD performed the physical exam on 4/26/2024 as documented above.    Lab Results - Last 18 Months   Lab Units 04/26/24  0833 02/23/24  0905   WBC 10*3/mm3 5.62 5.46   HEMOGLOBIN g/dL 12.4* 12.2*   HEMATOCRIT % 38.2 37.6   PLATELETS 10*3/mm3 182 186   MCV fL 89.7 88.7     Lab Results   Component Value Date    GLUCOSE 92 06/05/2022    BUN 17 06/05/2022    CREATININE 1.30 03/11/2024    EGFRIFNONA 73 02/16/2022    BCR 17.0 06/05/2022    K 3.7 06/05/2022    CO2 24.0 06/05/2022    CALCIUM 8.9 06/05/2022    ALBUMIN 3.80 06/02/2022    AST 18 06/02/2022     ALT 12 06/02/2022     Lab Results   Component Value Date    IRON 42 (L) 06/03/2022    TIBC 367 06/03/2022    FERRITIN 283.60 06/03/2022     Uric Acid   Date Value Ref Range Status   12/06/2021 5.7 3.4 - 7.0 mg/dL Final     Lab Results   Component Value Date    SALAZAR Negative 12/06/2021     Lab Results   Component Value Date    HAPTOGLOBIN 317 (H) 06/04/2022     Lab Results   Component Value Date    PTT >139.0 (C) 06/05/2022    INR 1.06 06/02/2022     Assessment & Plan     1.  Low-grade follicular lymphoma status post 8 cycles of rituximab and bendamustine followed by maintenance rituximab.  Reviewed the recent scans.  No suggestion of persistent disease.  He is to see me with laboratory exams in the future.  2.  Reviewed blood counts as well as the images and the report of the scans.  Discussed the results with him.  3.  See me in 4 months.  Continue port flush every 2 months.  Consider removal of port.  Discussed with him    Gregg Walton MD on 4/26/2024 at 9:01 AM.

## 2024-04-26 ENCOUNTER — LAB (OUTPATIENT)
Dept: LAB | Facility: HOSPITAL | Age: 74
End: 2024-04-26
Payer: COMMERCIAL

## 2024-04-26 ENCOUNTER — OFFICE VISIT (OUTPATIENT)
Dept: ONCOLOGY | Facility: CLINIC | Age: 74
End: 2024-04-26
Payer: COMMERCIAL

## 2024-04-26 VITALS
HEIGHT: 71 IN | HEART RATE: 75 BPM | OXYGEN SATURATION: 97 % | BODY MASS INDEX: 25.23 KG/M2 | DIASTOLIC BLOOD PRESSURE: 73 MMHG | WEIGHT: 180.2 LBS | TEMPERATURE: 97.9 F | SYSTOLIC BLOOD PRESSURE: 146 MMHG

## 2024-04-26 DIAGNOSIS — C82.90 FOLLICULAR NON-HODGKIN'S LYMPHOMA: ICD-10-CM

## 2024-04-26 DIAGNOSIS — C82.90 FOLLICULAR LYMPHOMA, UNSPECIFIED FOLLICULAR LYMPHOMA TYPE, UNSPECIFIED BODY REGION: Primary | ICD-10-CM

## 2024-04-26 DIAGNOSIS — C82.90 FOLLICULAR NON-HODGKIN'S LYMPHOMA: Primary | ICD-10-CM

## 2024-04-26 LAB
ALBUMIN SERPL-MCNC: 4.4 G/DL (ref 3.5–5.2)
ALBUMIN/GLOB SERPL: 1.9 G/DL
ALP SERPL-CCNC: 49 U/L (ref 39–117)
ALT SERPL W P-5'-P-CCNC: 23 U/L (ref 1–41)
ANION GAP SERPL CALCULATED.3IONS-SCNC: 8 MMOL/L (ref 5–15)
AST SERPL-CCNC: 23 U/L (ref 1–40)
BASOPHILS # BLD AUTO: 0.01 10*3/MM3 (ref 0–0.2)
BASOPHILS NFR BLD AUTO: 0.2 % (ref 0–1.5)
BILIRUB SERPL-MCNC: 0.4 MG/DL (ref 0–1.2)
BUN SERPL-MCNC: 25 MG/DL (ref 8–23)
BUN/CREAT SERPL: 20.2 (ref 7–25)
CALCIUM SPEC-SCNC: 10 MG/DL (ref 8.6–10.5)
CHLORIDE SERPL-SCNC: 106 MMOL/L (ref 98–107)
CO2 SERPL-SCNC: 30 MMOL/L (ref 22–29)
CREAT SERPL-MCNC: 1.24 MG/DL (ref 0.76–1.27)
DEPRECATED RDW RBC AUTO: 43.4 FL (ref 37–54)
EGFRCR SERPLBLD CKD-EPI 2021: 62.2 ML/MIN/1.73
EOSINOPHIL # BLD AUTO: 0 10*3/MM3 (ref 0–0.4)
EOSINOPHIL NFR BLD AUTO: 0 % (ref 0.3–6.2)
ERYTHROCYTE [DISTWIDTH] IN BLOOD BY AUTOMATED COUNT: 13.7 % (ref 12.3–15.4)
GLOBULIN UR ELPH-MCNC: 2.3 GM/DL
GLUCOSE SERPL-MCNC: 115 MG/DL (ref 65–99)
HCT VFR BLD AUTO: 38.2 % (ref 37.5–51)
HGB BLD-MCNC: 12.4 G/DL (ref 13–17.7)
HOLD SPECIMEN: NORMAL
LYMPHOCYTES # BLD AUTO: 2.37 10*3/MM3 (ref 0.7–3.1)
LYMPHOCYTES NFR BLD AUTO: 42.2 % (ref 19.6–45.3)
MCH RBC QN AUTO: 29.1 PG (ref 26.6–33)
MCHC RBC AUTO-ENTMCNC: 32.5 G/DL (ref 31.5–35.7)
MCV RBC AUTO: 89.7 FL (ref 79–97)
MONOCYTES # BLD AUTO: 0.66 10*3/MM3 (ref 0.1–0.9)
MONOCYTES NFR BLD AUTO: 11.7 % (ref 5–12)
NEUTROPHILS NFR BLD AUTO: 2.58 10*3/MM3 (ref 1.7–7)
NEUTROPHILS NFR BLD AUTO: 45.9 % (ref 42.7–76)
PLATELET # BLD AUTO: 182 10*3/MM3 (ref 140–450)
PMV BLD AUTO: 9.6 FL (ref 6–12)
POTASSIUM SERPL-SCNC: 4.4 MMOL/L (ref 3.5–5.2)
PROT SERPL-MCNC: 6.7 G/DL (ref 6–8.5)
RBC # BLD AUTO: 4.26 10*6/MM3 (ref 4.14–5.8)
SODIUM SERPL-SCNC: 144 MMOL/L (ref 136–145)
WBC NRBC COR # BLD AUTO: 5.62 10*3/MM3 (ref 3.4–10.8)

## 2024-04-26 PROCEDURE — 80053 COMPREHEN METABOLIC PANEL: CPT | Performed by: INTERNAL MEDICINE

## 2024-04-26 PROCEDURE — 85025 COMPLETE CBC W/AUTO DIFF WBC: CPT

## 2024-04-26 PROCEDURE — 36415 COLL VENOUS BLD VENIPUNCTURE: CPT

## 2024-04-29 ENCOUNTER — HOSPITAL ENCOUNTER (OUTPATIENT)
Dept: ONCOLOGY | Facility: HOSPITAL | Age: 74
Discharge: HOME | End: 2024-04-29
Admitting: INTERNAL MEDICINE
Payer: COMMERCIAL

## 2024-04-29 DIAGNOSIS — Z45.2 ENCOUNTER FOR CARE RELATED TO VASCULAR ACCESS PORT: Primary | ICD-10-CM

## 2024-04-29 PROCEDURE — 96523 IRRIG DRUG DELIVERY DEVICE: CPT

## 2024-04-29 PROCEDURE — 25010000002 HEPARIN LOCK FLUSH PER 10 UNITS: Performed by: INTERNAL MEDICINE

## 2024-04-29 RX ORDER — SODIUM CHLORIDE 0.9 % (FLUSH) 0.9 %
20 SYRINGE (ML) INJECTION AS NEEDED
Status: DISCONTINUED | OUTPATIENT
Start: 2024-04-29 | End: 2024-04-30 | Stop reason: HOSPADM

## 2024-04-29 RX ORDER — SODIUM CHLORIDE 0.9 % (FLUSH) 0.9 %
20 SYRINGE (ML) INJECTION AS NEEDED
OUTPATIENT
Start: 2024-04-29

## 2024-04-29 RX ORDER — HEPARIN SODIUM (PORCINE) LOCK FLUSH IV SOLN 100 UNIT/ML 100 UNIT/ML
500 SOLUTION INTRAVENOUS AS NEEDED
Status: DISCONTINUED | OUTPATIENT
Start: 2024-04-29 | End: 2024-04-30 | Stop reason: HOSPADM

## 2024-04-29 RX ORDER — HEPARIN SODIUM (PORCINE) LOCK FLUSH IV SOLN 100 UNIT/ML 100 UNIT/ML
500 SOLUTION INTRAVENOUS AS NEEDED
OUTPATIENT
Start: 2024-04-29

## 2024-04-29 RX ADMIN — Medication 20 ML: at 08:29

## 2024-04-29 RX ADMIN — HEPARIN 500 UNITS: 100 SYRINGE at 08:29

## 2024-04-29 NOTE — PROGRESS NOTES
Port accessed and flushed with good blood return noted. Port flushed with saline and heparin prior to needle removal.   Patient has next apts.

## 2024-06-21 ENCOUNTER — HOSPITAL ENCOUNTER (OUTPATIENT)
Dept: ONCOLOGY | Facility: HOSPITAL | Age: 74
Discharge: HOME | End: 2024-06-21
Payer: COMMERCIAL

## 2024-06-21 VITALS — OXYGEN SATURATION: 97 % | SYSTOLIC BLOOD PRESSURE: 118 MMHG | DIASTOLIC BLOOD PRESSURE: 76 MMHG | HEART RATE: 81 BPM

## 2024-06-21 DIAGNOSIS — Z45.2 ENCOUNTER FOR CARE RELATED TO VASCULAR ACCESS PORT: Primary | ICD-10-CM

## 2024-06-21 PROCEDURE — 96523 IRRIG DRUG DELIVERY DEVICE: CPT

## 2024-06-21 PROCEDURE — 25010000002 HEPARIN LOCK FLUSH PER 10 UNITS: Performed by: INTERNAL MEDICINE

## 2024-06-21 RX ORDER — SODIUM CHLORIDE 0.9 % (FLUSH) 0.9 %
20 SYRINGE (ML) INJECTION AS NEEDED
Status: DISCONTINUED | OUTPATIENT
Start: 2024-06-21 | End: 2024-06-22 | Stop reason: HOSPADM

## 2024-06-21 RX ORDER — HEPARIN SODIUM (PORCINE) LOCK FLUSH IV SOLN 100 UNIT/ML 100 UNIT/ML
500 SOLUTION INTRAVENOUS AS NEEDED
Status: DISCONTINUED | OUTPATIENT
Start: 2024-06-21 | End: 2024-06-22 | Stop reason: HOSPADM

## 2024-06-21 RX ORDER — SODIUM CHLORIDE 0.9 % (FLUSH) 0.9 %
20 SYRINGE (ML) INJECTION AS NEEDED
OUTPATIENT
Start: 2024-06-21

## 2024-06-21 RX ORDER — HEPARIN SODIUM (PORCINE) LOCK FLUSH IV SOLN 100 UNIT/ML 100 UNIT/ML
500 SOLUTION INTRAVENOUS AS NEEDED
OUTPATIENT
Start: 2024-06-21

## 2024-06-21 RX ADMIN — Medication 20 ML: at 08:20

## 2024-06-21 RX ADMIN — HEPARIN 500 UNITS: 100 SYRINGE at 08:20

## 2024-08-16 ENCOUNTER — HOSPITAL ENCOUNTER (OUTPATIENT)
Dept: ONCOLOGY | Facility: HOSPITAL | Age: 74
Discharge: HOME | End: 2024-08-16
Admitting: INTERNAL MEDICINE
Payer: COMMERCIAL

## 2024-08-16 DIAGNOSIS — C82.90 FOLLICULAR NON-HODGKIN'S LYMPHOMA: ICD-10-CM

## 2024-08-16 DIAGNOSIS — Z45.2 ENCOUNTER FOR CARE RELATED TO VASCULAR ACCESS PORT: Primary | ICD-10-CM

## 2024-08-16 LAB
ALBUMIN SERPL-MCNC: 4.4 G/DL (ref 3.5–5.2)
ALBUMIN/GLOB SERPL: 1.9 G/DL
ALP SERPL-CCNC: 48 U/L (ref 39–117)
ALT SERPL W P-5'-P-CCNC: 20 U/L (ref 1–41)
ANION GAP SERPL CALCULATED.3IONS-SCNC: 10.8 MMOL/L (ref 5–15)
AST SERPL-CCNC: 24 U/L (ref 1–40)
BASOPHILS # BLD AUTO: 0.02 10*3/MM3 (ref 0–0.2)
BASOPHILS NFR BLD AUTO: 0.5 % (ref 0–1.5)
BILIRUB SERPL-MCNC: 0.5 MG/DL (ref 0–1.2)
BUN SERPL-MCNC: 23 MG/DL (ref 8–23)
BUN/CREAT SERPL: 19 (ref 7–25)
CALCIUM SPEC-SCNC: 9.6 MG/DL (ref 8.6–10.5)
CHLORIDE SERPL-SCNC: 107 MMOL/L (ref 98–107)
CO2 SERPL-SCNC: 25.2 MMOL/L (ref 22–29)
CREAT SERPL-MCNC: 1.21 MG/DL (ref 0.76–1.27)
DEPRECATED RDW RBC AUTO: 40 FL (ref 37–54)
EGFRCR SERPLBLD CKD-EPI 2021: 63.6 ML/MIN/1.73
EOSINOPHIL # BLD AUTO: 0 10*3/MM3 (ref 0–0.4)
EOSINOPHIL NFR BLD AUTO: 0 % (ref 0.3–6.2)
ERYTHROCYTE [DISTWIDTH] IN BLOOD BY AUTOMATED COUNT: 12.8 % (ref 12.3–15.4)
GLOBULIN UR ELPH-MCNC: 2.3 GM/DL
GLUCOSE SERPL-MCNC: 134 MG/DL (ref 65–99)
HCT VFR BLD AUTO: 36.6 % (ref 37.5–51)
HGB BLD-MCNC: 12.3 G/DL (ref 13–17.7)
LYMPHOCYTES # BLD AUTO: 1.69 10*3/MM3 (ref 0.7–3.1)
LYMPHOCYTES NFR BLD AUTO: 38.9 % (ref 19.6–45.3)
MCH RBC QN AUTO: 29.6 PG (ref 26.6–33)
MCHC RBC AUTO-ENTMCNC: 33.6 G/DL (ref 31.5–35.7)
MCV RBC AUTO: 88.2 FL (ref 79–97)
MONOCYTES # BLD AUTO: 0.5 10*3/MM3 (ref 0.1–0.9)
MONOCYTES NFR BLD AUTO: 11.5 % (ref 5–12)
NEUTROPHILS NFR BLD AUTO: 2.14 10*3/MM3 (ref 1.7–7)
NEUTROPHILS NFR BLD AUTO: 49.1 % (ref 42.7–76)
PLATELET # BLD AUTO: 178 10*3/MM3 (ref 140–450)
PMV BLD AUTO: 10.2 FL (ref 6–12)
POTASSIUM SERPL-SCNC: 3.8 MMOL/L (ref 3.5–5.2)
PROT SERPL-MCNC: 6.7 G/DL (ref 6–8.5)
RBC # BLD AUTO: 4.15 10*6/MM3 (ref 4.14–5.8)
SODIUM SERPL-SCNC: 143 MMOL/L (ref 136–145)
WBC NRBC COR # BLD AUTO: 4.35 10*3/MM3 (ref 3.4–10.8)

## 2024-08-16 PROCEDURE — 80053 COMPREHEN METABOLIC PANEL: CPT | Performed by: INTERNAL MEDICINE

## 2024-08-16 PROCEDURE — 85025 COMPLETE CBC W/AUTO DIFF WBC: CPT | Performed by: INTERNAL MEDICINE

## 2024-08-16 PROCEDURE — 36591 DRAW BLOOD OFF VENOUS DEVICE: CPT

## 2024-08-16 PROCEDURE — 25010000002 HEPARIN LOCK FLUSH PER 10 UNITS: Performed by: INTERNAL MEDICINE

## 2024-08-16 RX ORDER — HEPARIN SODIUM (PORCINE) LOCK FLUSH IV SOLN 100 UNIT/ML 100 UNIT/ML
500 SOLUTION INTRAVENOUS AS NEEDED
Status: DISCONTINUED | OUTPATIENT
Start: 2024-08-16 | End: 2024-08-17 | Stop reason: HOSPADM

## 2024-08-16 RX ORDER — SODIUM CHLORIDE 0.9 % (FLUSH) 0.9 %
20 SYRINGE (ML) INJECTION AS NEEDED
Status: DISCONTINUED | OUTPATIENT
Start: 2024-08-16 | End: 2024-08-17 | Stop reason: HOSPADM

## 2024-08-16 RX ORDER — HEPARIN SODIUM (PORCINE) LOCK FLUSH IV SOLN 100 UNIT/ML 100 UNIT/ML
500 SOLUTION INTRAVENOUS AS NEEDED
OUTPATIENT
Start: 2024-08-16

## 2024-08-16 RX ORDER — SODIUM CHLORIDE 0.9 % (FLUSH) 0.9 %
20 SYRINGE (ML) INJECTION AS NEEDED
OUTPATIENT
Start: 2024-08-16

## 2024-08-16 RX ADMIN — Medication 20 ML: at 08:32

## 2024-08-16 RX ADMIN — HEPARIN 500 UNITS: 100 SYRINGE at 08:34

## 2024-09-10 NOTE — PROGRESS NOTES
HEMATOLOGY ONCOLOGY OUTPATIENT FOLLOW-UP       Patient name: Iván Fowler  : 1950  MRN: [id number]  Primary Care Physician: Mat Aiken MD  Referring Physician: Mat Aiken MD  Reason For Consult: History of follicular lymphoma.      History of Present Illness:  Patient is a 74 y.o.     2024: Mr. Fowler was in the office for the first time to transfer his care.  He was first diagnosed with a follicular lymphoma, stage III, in 2020.  He presented to his primary care physician with inguinal pain.  This led to a scan of the abdomen and pelvis that revealed lymphadenopathy in the left inguinal region with one of the largest lymph nodes measuring 4.1 x 2.6 cm.  There was also pelvic adenopathy on the left with one of the larger lymph nodes measuring 2.9 x 2.7 cm.  A large conglomerate of lymph nodes in the left obturator region that measured up to 4.8 x 3.5 cm was also identified.  Lymphadenopathy extended to the retroperitoneum, within the para-aortic region on the left.  He underwent a ultrasound-guided lymph node biopsy in 2020 that reported follicular lymphoma, low-grade.  He had no B symptoms.  A bone marrow biopsy revealed that no evidence of active disease and a PET scan confirmed the presence of multiple enlarged and fluorodeoxyglucose avid lymph nodes in the right neck, at the left subpectoral station, in the bilateral axillary regions, at the anterior mediastinum, the retroperitoneum and along the left iliac chain and left inguinal region.  On 2020 he received the first cycle of rituximab and Bendamustine.  In 2021 he received 1/8 cycle of the same chemotherapy.  There was evidence of response on the scans.  He was started on maintenance rituximab in August of that same year and continued the same treatment through 2022.  At that time, in the setting of SARS COV 2 infection and lung involvement, with persistently  positive testing, the medication was discontinued.  The lymphadenopathy had decreased in size and was stable.  In December 2023 the scans revealed an increase in the number and size of small lymph nodes within the axillary spaces bilaterally.  He is in the office without new symptoms.  He has been active and working full-time.  He is eating well and his weight has been stable.  He has been afebrile and without nocturnal diaphoresis.  On exam no palpable adenopathy in the neck or axillary spaces.  The lungs are clear bilaterally and the heart regular.  Abdomen without hepatomegaly or splenomegaly.  A decision was made to continue to follow with new scans in April 2024 and to see me with the results.    4/26/2024: Entirely asymptomatic.  Active and without new limitations.  Eating well.  No weight loss.  No fevers.  Denies pain, cough or dyspnea.  No diarrhea or dysuria.  On exam alert, conversant and in no distress.  No jaundice.  Respirations unlabored and lungs clear.  Heart regular and abdomen soft.  No edema.  Laboratory exams reveal persistent anemia with a hemoglobin of 12.4 g/dL.  Mean corpuscular volume 89.7 fL.  White cell count and differential as well as platelet count unremarkable.  To continue observation only.  He will see me in approximately 4 months.  Laboratory exams then.  Continue port flush every 2 months.  Consider removing the port.    9/13/2024:Feeling very well, working full time and gaining weight. Has been free of fevers. No nocturnal diaphoresis. Denies chest pain or cough. No abdominal pain or diarrhea. On exam alert and conversant. No distress and no jaundice. No oral lesions. The lungs are clear. Heart regular. Abdomen soft and not tender. No edema. No palpable adenopathy. Reviewed the laboratory exams. Persists with normocytic anemia that seems to be improving but very slowly. Will investigate further.     Past Medical History:   Diagnosis Date    Hyperlipidemia     Hypertension      To  follow-up with new scans in April 2024 and to see me with the results.  Past Surgical History:   Procedure Laterality Date    APPENDECTOMY      US GUIDED LYMPH NODE BIOPSY  9/25/2020    VASECTOMY Bilateral        Current Outpatient Medications:     acetaminophen (TYLENOL) 325 MG tablet, Take 2 tablets by mouth Every 4 (Four) Hours As Needed for Mild Pain ., Disp: , Rfl:     fenofibrate (TRICOR) 145 MG tablet, Take 1 tablet by mouth Daily., Disp: , Rfl:     ferrous sulfate 324 (65 Fe) MG tablet delayed-release EC tablet, Take 1 tablet by mouth Daily With Breakfast., Disp: 90 tablet, Rfl: 1    finasteride (PROSCAR) 5 MG tablet, Take 1 tablet by mouth Every Night., Disp: , Rfl:     hydrALAZINE (APRESOLINE) 25 MG tablet, Take 1 tablet by mouth 2 (Two) Times a Day., Disp: , Rfl:     montelukast (SINGULAIR) 10 MG tablet, Take 1 tablet by mouth Daily., Disp: , Rfl:     omeprazole (priLOSEC) 20 MG capsule, Take 1 capsule by mouth Daily., Disp: , Rfl:     tamsulosin (FLOMAX) 0.4 MG capsule 24 hr capsule, Take 1 capsule by mouth Every 12 (Twelve) Hours., Disp: , Rfl:   No current facility-administered medications for this visit.    Facility-Administered Medications Ordered in Other Visits:     heparin injection 500 Units, 500 Units, Intravenous, PRNIsabelle Alfonso, MD, 500 Units at 09/13/24 0852    sodium chloride 0.9 % flush 20 mL, 20 mL, Intravenous, PRNIsabelle Alfonso, MD, 20 mL at 09/13/24 0850    No Known Allergies    Family History   Problem Relation Age of Onset    Cerebral aneurysm Mother     Heart disease Father     Lymphoma Sister 73        non-Hogdkin.     Cancer-related family history includes Lymphoma (age of onset: 73) in his sister.    Social History     Tobacco Use    Smoking status: Never    Smokeless tobacco: Never   Vaping Use    Vaping status: Never Used   Substance Use Topics    Alcohol use: Yes     Comment: Occasional    Drug use: Never     Social History     Social History Narrative    Not on file  "    ROS:   Review of Systems   Constitutional:  Negative for activity change, appetite change, chills, diaphoresis, fatigue, fever and unexpected weight change.   HENT:  Negative for congestion, dental problem, drooling, ear discharge, ear pain, facial swelling, hearing loss, mouth sores, nosebleeds, postnasal drip, rhinorrhea, sinus pressure, sinus pain, sneezing, sore throat, tinnitus, trouble swallowing and voice change.    Eyes:  Negative for photophobia, pain, discharge, redness, itching and visual disturbance.   Respiratory:  Negative for apnea, cough, choking, chest tightness, shortness of breath, wheezing and stridor.    Cardiovascular:  Negative for chest pain, palpitations and leg swelling.   Gastrointestinal:  Negative for abdominal distention, abdominal pain, anal bleeding, blood in stool, constipation, diarrhea, nausea, rectal pain and vomiting.   Endocrine: Negative for cold intolerance, heat intolerance, polydipsia and polyuria.   Genitourinary:  Negative for decreased urine volume, difficulty urinating, dysuria, flank pain, frequency, genital sores, hematuria and urgency.   Musculoskeletal:  Negative for arthralgias, back pain, gait problem, joint swelling, myalgias, neck pain and neck stiffness.   Skin:  Negative for color change, pallor and rash.   Neurological:  Negative for dizziness, tremors, seizures, syncope, facial asymmetry, speech difficulty, weakness, light-headedness, numbness and headaches.   Hematological:  Negative for adenopathy. Does not bruise/bleed easily.   Psychiatric/Behavioral:  Negative for agitation, behavioral problems, confusion, decreased concentration, hallucinations, self-injury, sleep disturbance and suicidal ideas. The patient is not nervous/anxious.      Objective:    Vital Signs:  Vitals:    09/13/24 0901   BP: 144/76   Pulse: 78   Resp: 18   Temp: 97.8 °F (36.6 °C)   SpO2: 96%   Weight: 82.6 kg (182 lb)   Height: 180.3 cm (71\")   PainSc: 0-No pain     Body mass " index is 25.38 kg/m².    ECOG  (0) Fully active, able to carry on all predisease performance without restriction    Physical Exam:   Physical Exam  Constitutional:       General: He is not in acute distress.     Appearance: He is not ill-appearing, toxic-appearing or diaphoretic.   HENT:      Head: Normocephalic and atraumatic.      Right Ear: External ear normal.      Left Ear: External ear normal.      Nose: Nose normal.      Mouth/Throat:      Mouth: Mucous membranes are moist.      Pharynx: Oropharynx is clear.   Eyes:      General: No scleral icterus.        Right eye: No discharge.         Left eye: No discharge.      Conjunctiva/sclera: Conjunctivae normal.      Pupils: Pupils are equal, round, and reactive to light.   Cardiovascular:      Rate and Rhythm: Normal rate and regular rhythm.      Pulses: Normal pulses.      Heart sounds: Normal heart sounds. No murmur heard.     No friction rub. No gallop.   Pulmonary:      Effort: No respiratory distress.      Breath sounds: No stridor. No wheezing, rhonchi or rales.   Chest:      Chest wall: No tenderness.   Abdominal:      General: Abdomen is flat. Bowel sounds are normal. There is no distension.      Palpations: Abdomen is soft. There is no mass.      Tenderness: There is no abdominal tenderness. There is no right CVA tenderness, left CVA tenderness, guarding or rebound.   Musculoskeletal:         General: No swelling, tenderness, deformity or signs of injury.      Cervical back: No rigidity.      Right lower leg: No edema.      Left lower leg: No edema.   Lymphadenopathy:      Cervical: No cervical adenopathy.   Skin:     General: Skin is warm and dry.      Coloration: Skin is not jaundiced.      Findings: No bruising or rash.   Neurological:      General: No focal deficit present.      Mental Status: He is alert and oriented to person, place, and time.      Gait: Gait normal.   Psychiatric:         Mood and Affect: Mood normal.         Behavior: Behavior  normal.         Thought Content: Thought content normal.         Judgment: Judgment normal.     AIDAN Walton MD performed the physical exam on 9/13/2024 as documented above.     Lab Results - Last 18 Months   Lab Units 09/13/24  0852 08/16/24  0834 04/26/24  0833   WBC 10*3/mm3 4.13 4.35 5.62   HEMOGLOBIN g/dL 12.5* 12.3* 12.4*   HEMATOCRIT % 38.6 36.6* 38.2   PLATELETS 10*3/mm3 205 178 182   MCV fL 88.7 88.2 89.7     Lab Results   Component Value Date    GLUCOSE 134 (H) 08/16/2024    BUN 23 08/16/2024    CREATININE 1.21 08/16/2024    EGFRIFNONA 73 02/16/2022    BCR 19.0 08/16/2024    K 3.8 08/16/2024    CO2 25.2 08/16/2024    CALCIUM 9.6 08/16/2024    ALBUMIN 4.4 08/16/2024    AST 24 08/16/2024    ALT 20 08/16/2024     Lab Results   Component Value Date    IRON 42 (L) 06/03/2022    TIBC 367 06/03/2022    FERRITIN 283.60 06/03/2022     Uric Acid   Date Value Ref Range Status   12/06/2021 5.7 3.4 - 7.0 mg/dL Final     Lab Results   Component Value Date    SALAZAR Negative 12/06/2021     Lab Results   Component Value Date    HAPTOGLOBIN 317 (H) 06/04/2022     Lab Results   Component Value Date    PTT >139.0 (C) 06/05/2022    INR 1.06 06/02/2022     Assessment & Plan     1.  Low-grade follicular lymphoma status post 8 cycles of rituximab and bendamustine followed by maintenance rituximab.  No suggestion of recurrent disease. Will continue to follow.   2.  Normocytic anemia: to investigate further.   3.  Reviewed the laboratory exams and discussed with him.   4.  See me in 4 months with results. To have the port removed.     Gregg Walton MD on 9/13/2024 at 9:28 AM.

## 2024-09-13 ENCOUNTER — HOSPITAL ENCOUNTER (OUTPATIENT)
Dept: ONCOLOGY | Facility: HOSPITAL | Age: 74
Discharge: HOME | End: 2024-09-13
Admitting: INTERNAL MEDICINE
Payer: COMMERCIAL

## 2024-09-13 ENCOUNTER — OFFICE VISIT (OUTPATIENT)
Dept: ONCOLOGY | Facility: CLINIC | Age: 74
End: 2024-09-13
Payer: COMMERCIAL

## 2024-09-13 VITALS
WEIGHT: 182 LBS | BODY MASS INDEX: 25.48 KG/M2 | OXYGEN SATURATION: 96 % | SYSTOLIC BLOOD PRESSURE: 144 MMHG | HEART RATE: 78 BPM | DIASTOLIC BLOOD PRESSURE: 76 MMHG | TEMPERATURE: 97.8 F | HEIGHT: 71 IN | RESPIRATION RATE: 18 BRPM

## 2024-09-13 DIAGNOSIS — C82.90 FOLLICULAR NON-HODGKIN'S LYMPHOMA: Primary | ICD-10-CM

## 2024-09-13 DIAGNOSIS — Z45.2 ENCOUNTER FOR CARE RELATED TO VASCULAR ACCESS PORT: Primary | ICD-10-CM

## 2024-09-13 LAB
BASOPHILS # BLD AUTO: 0 10*3/MM3 (ref 0–0.2)
BASOPHILS NFR BLD AUTO: 0 % (ref 0–1.5)
DEPRECATED RDW RBC AUTO: 43 FL (ref 37–54)
EOSINOPHIL # BLD AUTO: 0 10*3/MM3 (ref 0–0.4)
EOSINOPHIL NFR BLD AUTO: 0 % (ref 0.3–6.2)
ERYTHROCYTE [DISTWIDTH] IN BLOOD BY AUTOMATED COUNT: 13.5 % (ref 12.3–15.4)
FERRITIN SERPL-MCNC: 456 NG/ML (ref 30–400)
FOLATE SERPL-MCNC: 11 NG/ML (ref 4.78–24.2)
HAPTOGLOB SERPL-MCNC: 91 MG/DL (ref 30–200)
HCT VFR BLD AUTO: 38.6 % (ref 37.5–51)
HGB BLD-MCNC: 12.5 G/DL (ref 13–17.7)
IRON 24H UR-MRATE: 136 MCG/DL (ref 59–158)
IRON SATN MFR SERPL: 26 % (ref 20–50)
LDH SERPL-CCNC: 197 U/L (ref 135–225)
LYMPHOCYTES # BLD AUTO: 1.61 10*3/MM3 (ref 0.7–3.1)
LYMPHOCYTES NFR BLD AUTO: 39 % (ref 19.6–45.3)
MCH RBC QN AUTO: 28.7 PG (ref 26.6–33)
MCHC RBC AUTO-ENTMCNC: 32.4 G/DL (ref 31.5–35.7)
MCV RBC AUTO: 88.7 FL (ref 79–97)
MONOCYTES # BLD AUTO: 0.54 10*3/MM3 (ref 0.1–0.9)
MONOCYTES NFR BLD AUTO: 13.1 % (ref 5–12)
NEUTROPHILS NFR BLD AUTO: 1.98 10*3/MM3 (ref 1.7–7)
NEUTROPHILS NFR BLD AUTO: 47.9 % (ref 42.7–76)
PLATELET # BLD AUTO: 205 10*3/MM3 (ref 140–450)
PMV BLD AUTO: 10.1 FL (ref 6–12)
RBC # BLD AUTO: 4.35 10*6/MM3 (ref 4.14–5.8)
RETICS # AUTO: 0.09 10*6/MM3 (ref 0.02–0.13)
RETICS/RBC NFR AUTO: 2.18 % (ref 0.7–1.9)
TIBC SERPL-MCNC: 516 MCG/DL (ref 298–536)
TRANSFERRIN SERPL-MCNC: 346 MG/DL (ref 200–360)
VIT B12 BLD-MCNC: 708 PG/ML (ref 211–946)
WBC NRBC COR # BLD AUTO: 4.13 10*3/MM3 (ref 3.4–10.8)

## 2024-09-13 PROCEDURE — 83540 ASSAY OF IRON: CPT | Performed by: INTERNAL MEDICINE

## 2024-09-13 PROCEDURE — 85025 COMPLETE CBC W/AUTO DIFF WBC: CPT | Performed by: INTERNAL MEDICINE

## 2024-09-13 PROCEDURE — 36591 DRAW BLOOD OFF VENOUS DEVICE: CPT

## 2024-09-13 PROCEDURE — 85045 AUTOMATED RETICULOCYTE COUNT: CPT | Performed by: INTERNAL MEDICINE

## 2024-09-13 PROCEDURE — 83010 ASSAY OF HAPTOGLOBIN QUANT: CPT | Performed by: INTERNAL MEDICINE

## 2024-09-13 PROCEDURE — 82728 ASSAY OF FERRITIN: CPT | Performed by: INTERNAL MEDICINE

## 2024-09-13 PROCEDURE — 25010000002 HEPARIN LOCK FLUSH PER 10 UNITS: Performed by: INTERNAL MEDICINE

## 2024-09-13 PROCEDURE — 82746 ASSAY OF FOLIC ACID SERUM: CPT | Performed by: INTERNAL MEDICINE

## 2024-09-13 PROCEDURE — 83615 LACTATE (LD) (LDH) ENZYME: CPT | Performed by: INTERNAL MEDICINE

## 2024-09-13 PROCEDURE — 84466 ASSAY OF TRANSFERRIN: CPT | Performed by: INTERNAL MEDICINE

## 2024-09-13 PROCEDURE — 82607 VITAMIN B-12: CPT | Performed by: INTERNAL MEDICINE

## 2024-09-13 RX ORDER — HEPARIN SODIUM (PORCINE) LOCK FLUSH IV SOLN 100 UNIT/ML 100 UNIT/ML
500 SOLUTION INTRAVENOUS AS NEEDED
Status: DISCONTINUED | OUTPATIENT
Start: 2024-09-13 | End: 2024-09-14 | Stop reason: HOSPADM

## 2024-09-13 RX ORDER — SODIUM CHLORIDE 0.9 % (FLUSH) 0.9 %
20 SYRINGE (ML) INJECTION AS NEEDED
Status: DISCONTINUED | OUTPATIENT
Start: 2024-09-13 | End: 2024-09-14 | Stop reason: HOSPADM

## 2024-09-13 RX ORDER — SODIUM CHLORIDE 0.9 % (FLUSH) 0.9 %
20 SYRINGE (ML) INJECTION AS NEEDED
OUTPATIENT
Start: 2024-09-13

## 2024-09-13 RX ORDER — HEPARIN SODIUM (PORCINE) LOCK FLUSH IV SOLN 100 UNIT/ML 100 UNIT/ML
500 SOLUTION INTRAVENOUS AS NEEDED
OUTPATIENT
Start: 2024-09-13

## 2024-09-13 RX ADMIN — Medication 20 ML: at 08:50

## 2024-09-13 RX ADMIN — HEPARIN 500 UNITS: 100 SYRINGE at 08:52

## 2024-10-14 ENCOUNTER — OFFICE VISIT (OUTPATIENT)
Dept: SURGERY | Facility: CLINIC | Age: 74
End: 2024-10-14
Payer: COMMERCIAL

## 2024-10-14 VITALS
HEART RATE: 83 BPM | HEIGHT: 71 IN | SYSTOLIC BLOOD PRESSURE: 155 MMHG | WEIGHT: 186.1 LBS | DIASTOLIC BLOOD PRESSURE: 80 MMHG | OXYGEN SATURATION: 96 % | TEMPERATURE: 98.7 F | BODY MASS INDEX: 26.05 KG/M2

## 2024-10-14 DIAGNOSIS — Z85.72 HISTORY OF FOLLICULAR LYMPHOMA: Primary | ICD-10-CM

## 2024-10-14 NOTE — PROGRESS NOTES
General Surgery History and Physical      Referring Provider: Gregg Walton MD    Chief Complaint:    Consult for port removal    History of Present Illness:    Iván Fowler is a 74 y.o. male with a history of low-grade follicular lymphoma, followed by Dr. Walton who presents for evaluation of a port removal.  Treatment was completed approximately 2 years ago.  No evidence of recurrent disease.  Dr. Walton recommending port removal as it is no longer needed.    Past Medical History:   Past Medical History:   Diagnosis Date    Cancer 09/20    Lymphoma    Hyperlipidemia     Hypertension       Past Surgical History:    Past Surgical History:   Procedure Laterality Date    APPENDECTOMY      US GUIDED LYMPH NODE BIOPSY  9/25/2020    VASECTOMY Bilateral      Family History:    Family History   Problem Relation Age of Onset    Cerebral aneurysm Mother     Heart disease Father     Lymphoma Sister 73        non-Hogdkin.    Cancer Sister      Social History:    Social History     Socioeconomic History    Marital status:    Tobacco Use    Smoking status: Never    Smokeless tobacco: Never   Vaping Use    Vaping status: Never Used   Substance and Sexual Activity    Alcohol use: Yes     Comment: Occasional    Drug use: Never    Sexual activity: Not Currently     Partners: Female     Allergies:   No Known Allergies    Medications:     Current Outpatient Medications:     acetaminophen (TYLENOL) 325 MG tablet, Take 2 tablets by mouth Every 4 (Four) Hours As Needed for Mild Pain ., Disp: , Rfl:     fenofibrate (TRICOR) 145 MG tablet, Take 1 tablet by mouth Daily., Disp: , Rfl:     finasteride (PROSCAR) 5 MG tablet, Take 1 tablet by mouth Every Night., Disp: , Rfl:     hydrALAZINE (APRESOLINE) 25 MG tablet, Take 1 tablet by mouth 2 (Two) Times a Day., Disp: , Rfl:     montelukast (SINGULAIR) 10 MG tablet, Take 1 tablet by mouth Daily., Disp: , Rfl:     omeprazole (priLOSEC) 20 MG capsule, Take 1 capsule by mouth  Daily., Disp: , Rfl:     tamsulosin (FLOMAX) 0.4 MG capsule 24 hr capsule, Take 1 capsule by mouth Every 12 (Twelve) Hours., Disp: , Rfl:     Radiology/Endoscopy:    CT chest/abdomen/pelvis 2024  Impression   1. No convincing evidence of recurrent malignancy or metastatic disease in the chest, abdomen or pelvis.  2. Axillary lymph nodes are not pathologically enlarged by CT criteria. No interval axillary node enlargement.  3. Additional chronic findings as described above.     Labs:    Recent labs reviewed    Review of Systems:   As noted above in HPI    Physical Exam:   No acute distress, alert  Nonlabored respirations  Right chest with port in place  Extremities warm and well-perfused with no gross deformities    Assessment and Plan:  Iván Fowler is a 74 y.o. male with history of low-grade follicular lymphoma, treatment completed and without evidence of recurrent disease.    - Offered port removal  - The procedure along with associate risk, benefits, terms were discussed with the patient; risk discussed include not limited to bleeding and infection  - Discussed option to have procedure done in OR versus in the office and patient elected to have it done in the office  - Will plan to have patient follow-up in the office for port removal    Rebeka Roth MD  General Surgery

## 2024-10-16 ENCOUNTER — TELEPHONE (OUTPATIENT)
Dept: SURGERY | Facility: CLINIC | Age: 74
End: 2024-10-16
Payer: COMMERCIAL

## 2024-10-16 NOTE — TELEPHONE ENCOUNTER
10/16/2024  Spoke Marcelo at [facility] [phone], ref #[id number], no auth required for CPT 56982, IOP done 10/21/24//[email or web address]

## 2024-10-21 ENCOUNTER — PROCEDURE VISIT (OUTPATIENT)
Dept: SURGERY | Facility: CLINIC | Age: 74
End: 2024-10-21
Payer: COMMERCIAL

## 2024-10-21 VITALS
TEMPERATURE: 97.7 F | OXYGEN SATURATION: 98 % | BODY MASS INDEX: 25.87 KG/M2 | SYSTOLIC BLOOD PRESSURE: 155 MMHG | HEIGHT: 71 IN | HEART RATE: 71 BPM | DIASTOLIC BLOOD PRESSURE: 87 MMHG | WEIGHT: 184.8 LBS

## 2024-10-21 DIAGNOSIS — Z85.72 HISTORY OF FOLLICULAR LYMPHOMA: Primary | ICD-10-CM

## 2024-10-21 PROCEDURE — 36590 REMOVAL TUNNELED CV CATH: CPT | Performed by: SURGERY

## 2024-10-21 NOTE — PROGRESS NOTES
Operative/Procedure Report    Patient Name:  Iván Fowler  YOB: 1950    Date of Surgery:      Pre-op Diagnosis:   History of lymphoma, port in place       Post-op Diagnosis:   History of lymphoma, port in place      Surgeon:  Rebeka Roth MD    Anesthesia: Local    Estimated Blood Loss: Minimal    Specimen:          None  Port removed but not sent to pathology     Findings: Port removed intact    Complications: None immediate    Clinical Indications:  Patient is a 74-year-old male with prior history of lymphoma.  He has a port in place which is no longer needed and he presents today for removal.  Procedure along with associated risk, benefits, alternatives were discussed with the patient and he expressed understanding.  Informed consent was obtained.    Description of Procedure: Patient was placed in the supine position in the procedure room.  The right chest was prepped and draped in the usual sterile fashion.  The prior incision was infiltrated with local anesthetic.  An incision was made overlying the prior scar and we dissected down to the port with sharp dissection.  The port was grasped and freed from surrounding tissues.  The port was then pulled out of the port pocket.  A figure-of-eight stitch was placed around the catheter tract using 3-0 Vicryl suture.  The port was then removed completely and the figure-of-eight stitch was tied down.  The area was adequately hemostatic.  The deep dermal layer was reapproximated with simple interrupted stitches using 3-0 Vicryl suture.  The skin was then closed with 4-0 Vicryl in a running subcuticular fashion.  The area was cleaned and sterile dressings were applied.  The patient tolerated the procedure well.    Rebeka Roth MD     Date: 10/21/2024  Time: 13:32 EDT    This note was created using Dragon Voice Recognition software.

## 2024-10-23 ENCOUNTER — TELEPHONE (OUTPATIENT)
Dept: SURGERY | Facility: CLINIC | Age: 74
End: 2024-10-23
Payer: COMMERCIAL

## 2024-10-23 NOTE — TELEPHONE ENCOUNTER
Call placed to patient to check on after IOP on Monday 10/21/2024, port removal. Patient reports doing well, no pain. Advised to call us with any questions or concerns. Patient expressed understanding of all discussed.

## 2024-12-13 ENCOUNTER — TELEPHONE (OUTPATIENT)
Dept: ONCOLOGY | Facility: CLINIC | Age: 74
End: 2024-12-13
Payer: COMMERCIAL

## 2024-12-13 NOTE — TELEPHONE ENCOUNTER
Hub is instructed to read the documentation below to patient  Called Pt and left v/m letting Pt know we r/s him to see Melanie on same date just at a little later of time....9:15am

## 2025-01-10 NOTE — PROGRESS NOTES
HEMATOLOGY ONCOLOGY OUTPATIENT FOLLOW-UP       Patient name: Iván Fowler  : 1950  MRN: [id number]  Primary Care Physician: Mat Aiken MD  Referring Physician: Mat Aiken MD  Reason For Consult: History of follicular lymphoma.      History of Present Illness:  Patient is a 74 y.o.     2024: Mr. Fowler was in the office for the first time to transfer his care.  He was first diagnosed with a follicular lymphoma, stage III, in 2020.  He presented to his primary care physician with inguinal pain.  This led to a scan of the abdomen and pelvis that revealed lymphadenopathy in the left inguinal region with one of the largest lymph nodes measuring 4.1 x 2.6 cm.  There was also pelvic adenopathy on the left with one of the larger lymph nodes measuring 2.9 x 2.7 cm.  A large conglomerate of lymph nodes in the left obturator region that measured up to 4.8 x 3.5 cm was also identified.  Lymphadenopathy extended to the retroperitoneum, within the para-aortic region on the left.  He underwent a ultrasound-guided lymph node biopsy in 2020 that reported follicular lymphoma, low-grade.  He had no B symptoms.  A bone marrow biopsy revealed that no evidence of active disease and a PET scan confirmed the presence of multiple enlarged and fluorodeoxyglucose avid lymph nodes in the right neck, at the left subpectoral station, in the bilateral axillary regions, at the anterior mediastinum, the retroperitoneum and along the left iliac chain and left inguinal region.  On 2020 he received the first cycle of rituximab and Bendamustine.  In 2021 he received 1/8 cycle of the same chemotherapy.  There was evidence of response on the scans.  He was started on maintenance rituximab in August of that same year and continued the same treatment through 2022.  At that time, in the setting of SARS COV 2 infection and lung involvement, with persistently  positive testing, the medication was discontinued.  The lymphadenopathy had decreased in size and was stable.  In December 2023 the scans revealed an increase in the number and size of small lymph nodes within the axillary spaces bilaterally.  He is in the office without new symptoms.  He has been active and working full-time.  He is eating well and his weight has been stable.  He has been afebrile and without nocturnal diaphoresis.  On exam no palpable adenopathy in the neck or axillary spaces.  The lungs are clear bilaterally and the heart regular.  Abdomen without hepatomegaly or splenomegaly.  A decision was made to continue to follow with new scans in April 2024 and to see me with the results.    4/26/2024: Entirely asymptomatic.  Active and without new limitations.  Eating well.  No weight loss.  No fevers.  Denies pain, cough or dyspnea.  No diarrhea or dysuria.  On exam alert, conversant and in no distress.  No jaundice.  Respirations unlabored and lungs clear.  Heart regular and abdomen soft.  No edema.  Laboratory exams reveal persistent anemia with a hemoglobin of 12.4 g/dL.  Mean corpuscular volume 89.7 fL.  White cell count and differential as well as platelet count unremarkable.  To continue observation only.  He will see me in approximately 4 months.  Laboratory exams then.  Continue port flush every 2 months.  Consider removing the port.    9/13/2024:Feeling very well, working full time and gaining weight. Has been free of fevers. No nocturnal diaphoresis. Denies chest pain or cough. No abdominal pain or diarrhea. On exam alert and conversant. No distress and no jaundice. No oral lesions. The lungs are clear. Heart regular. Abdomen soft and not tender. No edema. No palpable adenopathy. Reviewed the laboratory exams. Persists with normocytic anemia that seems to be improving but very slowly. Will investigate further.     1/13/2025: Patient patient seen today for follow-up.  Reports feeling well.   Remains active without limitations.  Status post port removal.  He did have recent sinus infection for which he completed antibiotics.  Recent cataract surgery last week.  He has good appetite and his weight is stable.  He has been afebrile without nocturnal diaphoresis.  On exam he is in good spirits, conversant, no distress.  No jaundice.  Respirations unlabored and lungs are clear.  Heart regular, abdomen soft and nontender.  On exam, no palpable adenopathy.  Laboratory exams reviewed.  Persistent anemia with hemoglobin of 11.9 g/dL.  White cell count and differential as well as platelet count unremarkable.  Will continue with observation only.    Past Medical History:   Diagnosis Date    Cancer 09/20    Lymphoma    Hyperlipidemia     Hypertension      To follow-up with new scans in April 2024 and to see me with the results.  Past Surgical History:   Procedure Laterality Date    APPENDECTOMY      US GUIDED LYMPH NODE BIOPSY  9/25/2020    VASECTOMY Bilateral        Current Outpatient Medications:     acetaminophen (TYLENOL) 325 MG tablet, Take 2 tablets by mouth Every 4 (Four) Hours As Needed for Mild Pain ., Disp: , Rfl:     doxycycline (DORYX) 100 MG enteric coated tablet, , Disp: , Rfl:     doxycycline (VIBRAMYCIN) 100 MG capsule, Take 1 capsule by mouth Every 12 (Twelve) Hours., Disp: , Rfl:     fenofibrate (TRICOR) 145 MG tablet, Take 1 tablet by mouth Daily., Disp: , Rfl:     finasteride (PROSCAR) 5 MG tablet, Take 1 tablet by mouth Every Night., Disp: , Rfl:     hydrALAZINE (APRESOLINE) 25 MG tablet, Take 1 tablet by mouth 2 (Two) Times a Day., Disp: , Rfl:     montelukast (SINGULAIR) 10 MG tablet, Take 1 tablet by mouth Daily., Disp: , Rfl:     omeprazole (priLOSEC) 20 MG capsule, Take 1 capsule by mouth Daily., Disp: , Rfl:     tamsulosin (FLOMAX) 0.4 MG capsule 24 hr capsule, Take 1 capsule by mouth Every 12 (Twelve) Hours., Disp: , Rfl:     No Known Allergies    Family History   Problem Relation Age of  "Onset    Cerebral aneurysm Mother     Heart disease Father     Lymphoma Sister 73        non-Hogdkin.    Cancer Sister      Cancer-related family history includes Cancer in his sister; Lymphoma (age of onset: 73) in his sister.    Social History     Tobacco Use    Smoking status: Never    Smokeless tobacco: Never   Vaping Use    Vaping status: Never Used   Substance Use Topics    Alcohol use: Not Currently     Comment: Occasional    Drug use: Never     Social History     Social History Narrative    Not on file     ROS:   Review of Systems   Constitutional:  Negative for activity change, appetite change, chills, diaphoresis, fatigue, fever and unexpected weight change.   HENT:  Negative for congestion, ear pain, mouth sores, nosebleeds, sinus pain, sore throat, tinnitus and trouble swallowing.    Eyes:  Negative for photophobia and visual disturbance.   Respiratory:  Negative for cough, choking, chest tightness, shortness of breath, wheezing and stridor.    Cardiovascular:  Negative for chest pain, palpitations and leg swelling.   Gastrointestinal:  Negative for abdominal pain, diarrhea, nausea and vomiting.   Endocrine: Negative for cold intolerance and heat intolerance.   Genitourinary:  Negative for dysuria and hematuria.   Musculoskeletal:  Negative for back pain, joint swelling, neck pain and neck stiffness.   Skin:  Negative for color change and rash.   Neurological:  Negative for dizziness, tremors, seizures, syncope, weakness, light-headedness and headaches.   Hematological:  Negative for adenopathy. Does not bruise/bleed easily.        No obvious bleeding   Psychiatric/Behavioral:  Negative for agitation, confusion and hallucinations.      Objective:    Vital Signs:  Vitals:    01/13/25 0925   BP: 164/91   Pulse: 72   Temp: 97.8 °F (36.6 °C)   SpO2: 99%   Weight: 83 kg (183 lb)   Height: 180.3 cm (70.98\")   PainSc: 0-No pain       Body mass index is 25.53 kg/m².    ECOG  (0) Fully active, able to carry on " all predisease performance without restriction    Physical Exam:   Physical Exam  Vitals reviewed.   Constitutional:       Appearance: Normal appearance. He is not ill-appearing.   HENT:      Head: Normocephalic and atraumatic.      Mouth/Throat:      Mouth: Mucous membranes are moist.   Eyes:      General: No scleral icterus.     Pupils: Pupils are equal, round, and reactive to light.   Cardiovascular:      Rate and Rhythm: Normal rate and regular rhythm.      Pulses: Normal pulses.      Heart sounds: No murmur heard.  Pulmonary:      Effort: Pulmonary effort is normal.      Breath sounds: Normal breath sounds.   Abdominal:      General: There is no distension.      Palpations: Abdomen is soft. There is no mass.      Tenderness: There is no abdominal tenderness.   Musculoskeletal:         General: No swelling. Normal range of motion.      Cervical back: Normal range of motion and neck supple.   Lymphadenopathy:      Cervical: No cervical adenopathy.   Skin:     General: Skin is warm.      Coloration: Skin is not jaundiced or pale.      Findings: No bruising, erythema, lesion or rash.   Neurological:      General: No focal deficit present.      Mental Status: He is alert and oriented to person, place, and time.      Sensory: No sensory deficit.      Motor: No weakness.   Psychiatric:         Mood and Affect: Mood normal.         Behavior: Behavior normal.         Thought Content: Thought content normal.         Judgment: Judgment normal.         Lab Results - Last 18 Months   Lab Units 01/13/25  0922 09/13/24  0852 08/16/24  0834   WBC 10*3/mm3 5.62 4.13 4.35   HEMOGLOBIN g/dL 11.9* 12.5* 12.3*   HEMATOCRIT % 37.6 38.6 36.6*   PLATELETS 10*3/mm3 227 205 178   MCV fL 89.1 88.7 88.2     Lab Results   Component Value Date    GLUCOSE 96 01/13/2025    BUN 31 (H) 01/13/2025    CREATININE 1.52 (H) 01/13/2025    EGFRIFNONA 73 02/16/2022    BCR 20.4 01/13/2025    K 4.5 01/13/2025    CO2 23.4 01/13/2025    CALCIUM 9.7  01/13/2025    ALBUMIN 4.3 01/13/2025    AST 20 01/13/2025    ALT 20 01/13/2025     Lab Results   Component Value Date    IRON 136 09/13/2024    TIBC 516 09/13/2024    FERRITIN 456.00 (H) 09/13/2024     LDH   Date Value Ref Range Status   01/13/2025 191 135 - 225 U/L Final     Uric Acid   Date Value Ref Range Status   12/06/2021 5.7 3.4 - 7.0 mg/dL Final     Lab Results   Component Value Date    SALAZAR Negative 12/06/2021     Lab Results   Component Value Date    HAPTOGLOBIN 91 09/13/2024     Lab Results   Component Value Date    PTT >139.0 (C) 06/05/2022    INR 1.06 06/02/2022     Assessment & Plan     1.  Low-grade follicular lymphoma status post 8 cycles of rituximab and bendamustine followed by maintenance rituximab.  No suggestion of recurrent disease.  Will continue to follow  2.  Normocytic anemia: No evidence of nutritional deficiencies or hemolysis.  Continue to monitor  3.  Reviewed laboratory exams and discussed with him  4.  Follow-up with Dr. Walton in 4 months, sooner if condition indicates      Electronically signed by Melanie Chadwick PA-C

## 2025-01-13 ENCOUNTER — OFFICE VISIT (OUTPATIENT)
Dept: ONCOLOGY | Facility: CLINIC | Age: 75
End: 2025-01-13
Payer: COMMERCIAL

## 2025-01-13 ENCOUNTER — LAB (OUTPATIENT)
Dept: LAB | Facility: HOSPITAL | Age: 75
End: 2025-01-13
Payer: COMMERCIAL

## 2025-01-13 VITALS
HEIGHT: 71 IN | DIASTOLIC BLOOD PRESSURE: 91 MMHG | TEMPERATURE: 97.8 F | SYSTOLIC BLOOD PRESSURE: 164 MMHG | BODY MASS INDEX: 25.62 KG/M2 | OXYGEN SATURATION: 99 % | WEIGHT: 183 LBS | HEART RATE: 72 BPM

## 2025-01-13 DIAGNOSIS — C82.90 FOLLICULAR NON-HODGKIN'S LYMPHOMA: ICD-10-CM

## 2025-01-13 DIAGNOSIS — C82.90 FOLLICULAR LYMPHOMA, UNSPECIFIED FOLLICULAR LYMPHOMA TYPE, UNSPECIFIED BODY REGION: Primary | ICD-10-CM

## 2025-01-13 DIAGNOSIS — C82.90 FOLLICULAR NON-HODGKIN'S LYMPHOMA: Primary | ICD-10-CM

## 2025-01-13 LAB
ALBUMIN SERPL-MCNC: 4.3 G/DL (ref 3.5–5.2)
ALBUMIN/GLOB SERPL: 2 G/DL
ALP SERPL-CCNC: 45 U/L (ref 39–117)
ALT SERPL W P-5'-P-CCNC: 20 U/L (ref 1–41)
ANION GAP SERPL CALCULATED.3IONS-SCNC: 9.6 MMOL/L (ref 5–15)
AST SERPL-CCNC: 20 U/L (ref 1–40)
BASOPHILS # BLD AUTO: 0.01 10*3/MM3 (ref 0–0.2)
BASOPHILS NFR BLD AUTO: 0.2 % (ref 0–1.5)
BILIRUB SERPL-MCNC: 0.4 MG/DL (ref 0–1.2)
BUN SERPL-MCNC: 31 MG/DL (ref 8–23)
BUN/CREAT SERPL: 20.4 (ref 7–25)
CALCIUM SPEC-SCNC: 9.7 MG/DL (ref 8.6–10.5)
CHLORIDE SERPL-SCNC: 114 MMOL/L (ref 98–107)
CO2 SERPL-SCNC: 23.4 MMOL/L (ref 22–29)
CREAT SERPL-MCNC: 1.52 MG/DL (ref 0.76–1.27)
DEPRECATED RDW RBC AUTO: 43 FL (ref 37–54)
EGFRCR SERPLBLD CKD-EPI 2021: 48.4 ML/MIN/1.73
EOSINOPHIL # BLD AUTO: 0 10*3/MM3 (ref 0–0.4)
EOSINOPHIL NFR BLD AUTO: 0 % (ref 0.3–6.2)
ERYTHROCYTE [DISTWIDTH] IN BLOOD BY AUTOMATED COUNT: 13.9 % (ref 12.3–15.4)
GLOBULIN UR ELPH-MCNC: 2.1 GM/DL
GLUCOSE SERPL-MCNC: 96 MG/DL (ref 65–99)
HCT VFR BLD AUTO: 37.6 % (ref 37.5–51)
HGB BLD-MCNC: 11.9 G/DL (ref 13–17.7)
HOLD SPECIMEN: NORMAL
HOLD SPECIMEN: NORMAL
LDH SERPL-CCNC: 191 U/L (ref 135–225)
LYMPHOCYTES # BLD AUTO: 1.95 10*3/MM3 (ref 0.7–3.1)
LYMPHOCYTES NFR BLD AUTO: 34.7 % (ref 19.6–45.3)
MCH RBC QN AUTO: 28.2 PG (ref 26.6–33)
MCHC RBC AUTO-ENTMCNC: 31.6 G/DL (ref 31.5–35.7)
MCV RBC AUTO: 89.1 FL (ref 79–97)
MONOCYTES # BLD AUTO: 0.62 10*3/MM3 (ref 0.1–0.9)
MONOCYTES NFR BLD AUTO: 11 % (ref 5–12)
NEUTROPHILS NFR BLD AUTO: 3.04 10*3/MM3 (ref 1.7–7)
NEUTROPHILS NFR BLD AUTO: 54.1 % (ref 42.7–76)
PLATELET # BLD AUTO: 227 10*3/MM3 (ref 140–450)
PMV BLD AUTO: 9.6 FL (ref 6–12)
POTASSIUM SERPL-SCNC: 4.5 MMOL/L (ref 3.5–5.2)
PROT SERPL-MCNC: 6.4 G/DL (ref 6–8.5)
RBC # BLD AUTO: 4.22 10*6/MM3 (ref 4.14–5.8)
SODIUM SERPL-SCNC: 147 MMOL/L (ref 136–145)
WBC NRBC COR # BLD AUTO: 5.62 10*3/MM3 (ref 3.4–10.8)

## 2025-01-13 PROCEDURE — 83615 LACTATE (LD) (LDH) ENZYME: CPT | Performed by: PHYSICIAN ASSISTANT

## 2025-01-13 PROCEDURE — 36415 COLL VENOUS BLD VENIPUNCTURE: CPT

## 2025-01-13 PROCEDURE — 80053 COMPREHEN METABOLIC PANEL: CPT | Performed by: PHYSICIAN ASSISTANT

## 2025-01-13 PROCEDURE — 99214 OFFICE O/P EST MOD 30 MIN: CPT | Performed by: PHYSICIAN ASSISTANT

## 2025-01-13 PROCEDURE — 85025 COMPLETE CBC W/AUTO DIFF WBC: CPT

## 2025-01-13 RX ORDER — DOXYCYCLINE 100 MG/1
1 CAPSULE ORAL EVERY 12 HOURS SCHEDULED
COMMUNITY
Start: 2025-01-04

## 2025-01-13 RX ORDER — DOXYCYCLINE HYCLATE 100 MG/1
TABLET, DELAYED RELEASE ORAL
COMMUNITY
Start: 2025-01-04

## 2025-05-12 NOTE — PROGRESS NOTES
HEMATOLOGY ONCOLOGY OUTPATIENT FOLLOW-UP       Patient name: Iván Fowler  : 1950  MRN: [id number]  Primary Care Physician: Mat Aiken MD  Referring Physician: No ref. provider found  Reason For Consult: History of follicular lymphoma.      History of Present Illness:  Patient is a 74 y.o.     2024: Mr. Fowler was in the office for the first time to transfer his care.  He was first diagnosed with a follicular lymphoma, stage III, in 2020.  He presented to his primary care physician with inguinal pain.  This led to a scan of the abdomen and pelvis that revealed lymphadenopathy in the left inguinal region with one of the largest lymph nodes measuring 4.1 x 2.6 cm.  There was also pelvic adenopathy on the left with one of the larger lymph nodes measuring 2.9 x 2.7 cm.  A large conglomerate of lymph nodes in the left obturator region that measured up to 4.8 x 3.5 cm was also identified.  Lymphadenopathy extended to the retroperitoneum, within the para-aortic region on the left.  He underwent a ultrasound-guided lymph node biopsy in 2020 that reported follicular lymphoma, low-grade.  He had no B symptoms.  A bone marrow biopsy revealed that no evidence of active disease and a PET scan confirmed the presence of multiple enlarged and fluorodeoxyglucose avid lymph nodes in the right neck, at the left subpectoral station, in the bilateral axillary regions, at the anterior mediastinum, the retroperitoneum and along the left iliac chain and left inguinal region.  On 2020 he received the first cycle of rituximab and Bendamustine.  In 2021 he received 1/8 cycle of the same chemotherapy.  There was evidence of response on the scans.  He was started on maintenance rituximab in August of that same year and continued the same treatment through 2022.  At that time, in the setting of SARS COV 2 infection and lung involvement, with persistently  positive testing, the medication was discontinued.  The lymphadenopathy had decreased in size and was stable.  In December 2023 the scans revealed an increase in the number and size of small lymph nodes within the axillary spaces bilaterally.  He is in the office without new symptoms.  He has been active and working full-time.  He is eating well and his weight has been stable.  He has been afebrile and without nocturnal diaphoresis.  On exam no palpable adenopathy in the neck or axillary spaces.  The lungs are clear bilaterally and the heart regular.  Abdomen without hepatomegaly or splenomegaly.  A decision was made to continue to follow with new scans in April 2024 and to see me with the results.    4/26/2024: Entirely asymptomatic.  Active and without new limitations.  Eating well.  No weight loss.  No fevers.  Denies pain, cough or dyspnea.  No diarrhea or dysuria.  On exam alert, conversant and in no distress.  No jaundice.  Respirations unlabored and lungs clear.  Heart regular and abdomen soft.  No edema.  Laboratory exams reveal persistent anemia with a hemoglobin of 12.4 g/dL.  Mean corpuscular volume 89.7 fL.  White cell count and differential as well as platelet count unremarkable.  To continue observation only.  He will see me in approximately 4 months.  Laboratory exams then.  Continue port flush every 2 months.  Consider removing the port.    9/13/2024:Feeling very well, working full time and gaining weight. Has been free of fevers. No nocturnal diaphoresis. Denies chest pain or cough. No abdominal pain or diarrhea. On exam alert and conversant. No distress and no jaundice. No oral lesions. The lungs are clear. Heart regular. Abdomen soft and not tender. No edema. No palpable adenopathy. Reviewed the laboratory exams. Persists with normocytic anemia that seems to be improving but very slowly. Will investigate further.     4/14/2025: Without new symptoms.  Active and without new limitations.  Has gained  some weight.  Denies fevers or nocturnal diaphoresis.  Sleeps well at night.  No headaches or seizures.  No chest pains.  Complains of dyspnea when going up steps but not any more than usual.  No abdominal pain or diarrhea and no dysuria.  Exam alert and conversant.  Oriented.  No distress.  No jaundice.  Lungs clear and heart regular.  Abdomen soft and without hepatomegaly or splenomegaly.  No edema.  Laboratory exams reviewed and discussed with him.  To continue observation.  He is to see me in 6 months.    Past Medical History:   Diagnosis Date    Cancer 09/20    Lymphoma    Hyperlipidemia     Hypertension      To follow-up with new scans in April 2024 and to see me with the results.  Past Surgical History:   Procedure Laterality Date    APPENDECTOMY      US GUIDED LYMPH NODE BIOPSY  9/25/2020    VASECTOMY Bilateral        Current Outpatient Medications:     acetaminophen (TYLENOL) 325 MG tablet, Take 2 tablets by mouth Every 4 (Four) Hours As Needed for Mild Pain ., Disp: , Rfl:     fenofibrate (TRICOR) 145 MG tablet, Take 1 tablet by mouth Daily., Disp: , Rfl:     finasteride (PROSCAR) 5 MG tablet, Take 1 tablet by mouth Every Night., Disp: , Rfl:     hydrALAZINE (APRESOLINE) 25 MG tablet, Take 1 tablet by mouth 2 (Two) Times a Day., Disp: , Rfl:     montelukast (SINGULAIR) 10 MG tablet, Take 1 tablet by mouth Daily., Disp: , Rfl:     omeprazole (priLOSEC) 20 MG capsule, Take 1 capsule by mouth Daily., Disp: , Rfl:     tamsulosin (FLOMAX) 0.4 MG capsule 24 hr capsule, Take 1 capsule by mouth Every 12 (Twelve) Hours., Disp: , Rfl:     No Known Allergies    Family History   Problem Relation Age of Onset    Cerebral aneurysm Mother     Heart disease Father     Lymphoma Sister 73        non-Hogdkin.    Cancer Sister      Cancer-related family history includes Cancer in his sister; Lymphoma (age of onset: 73) in his sister.    Social History     Tobacco Use    Smoking status: Never    Smokeless tobacco: Never    Vaping Use    Vaping status: Never Used   Substance Use Topics    Alcohol use: Not Currently     Comment: Occasional    Drug use: Never     Social History     Social History Narrative    Not on file     ROS:   Review of Systems   Constitutional:  Negative for activity change, appetite change, chills, diaphoresis, fatigue, fever and unexpected weight change.   HENT:  Negative for congestion, dental problem, drooling, ear discharge, ear pain, facial swelling, hearing loss, mouth sores, nosebleeds, postnasal drip, rhinorrhea, sinus pressure, sinus pain, sneezing, sore throat, tinnitus, trouble swallowing and voice change.    Eyes:  Negative for photophobia, pain, discharge, redness, itching and visual disturbance.   Respiratory:  Negative for apnea, cough, choking, chest tightness, shortness of breath, wheezing and stridor.    Cardiovascular:  Negative for chest pain, palpitations and leg swelling.   Gastrointestinal:  Negative for abdominal distention, abdominal pain, anal bleeding, blood in stool, constipation, diarrhea, nausea, rectal pain and vomiting.   Endocrine: Negative for cold intolerance, heat intolerance, polydipsia and polyuria.   Genitourinary:  Negative for decreased urine volume, difficulty urinating, dysuria, flank pain, frequency, genital sores, hematuria and urgency.   Musculoskeletal:  Negative for arthralgias, back pain, gait problem, joint swelling, myalgias, neck pain and neck stiffness.   Skin:  Negative for color change, pallor and rash.   Neurological:  Negative for dizziness, tremors, seizures, syncope, facial asymmetry, speech difficulty, weakness, light-headedness, numbness and headaches.   Hematological:  Negative for adenopathy. Does not bruise/bleed easily.   Psychiatric/Behavioral:  Negative for agitation, behavioral problems, confusion, decreased concentration, hallucinations, self-injury, sleep disturbance and suicidal ideas. The patient is not nervous/anxious.   "    Objective:    Vital Signs:  Vitals:    05/14/25 0903   BP: 145/80   Pulse: 77   Resp: 15   Temp: 98 °F (36.7 °C)   SpO2: 97%   Weight: 85.9 kg (189 lb 6.4 oz)   Height: 180.3 cm (71\")   PainSc: 0-No pain     Body mass index is 26.42 kg/m².    ECOG  (0) Fully active, able to carry on all predisease performance without restriction    Physical Exam:   Physical Exam  Constitutional:       General: He is not in acute distress.     Appearance: He is not ill-appearing, toxic-appearing or diaphoretic.   HENT:      Head: Normocephalic and atraumatic.      Right Ear: External ear normal.      Left Ear: External ear normal.      Nose: Nose normal.      Mouth/Throat:      Mouth: Mucous membranes are moist.      Pharynx: Oropharynx is clear.   Eyes:      General: No scleral icterus.        Right eye: No discharge.         Left eye: No discharge.      Conjunctiva/sclera: Conjunctivae normal.      Pupils: Pupils are equal, round, and reactive to light.   Cardiovascular:      Rate and Rhythm: Normal rate and regular rhythm.      Pulses: Normal pulses.      Heart sounds: Normal heart sounds. No murmur heard.     No friction rub. No gallop.   Pulmonary:      Effort: No respiratory distress.      Breath sounds: No stridor. No wheezing, rhonchi or rales.   Chest:      Chest wall: No tenderness.   Abdominal:      General: Abdomen is flat. Bowel sounds are normal. There is no distension.      Palpations: Abdomen is soft. There is no mass.      Tenderness: There is no abdominal tenderness. There is no right CVA tenderness, left CVA tenderness, guarding or rebound.   Musculoskeletal:         General: No swelling, tenderness, deformity or signs of injury.      Cervical back: No rigidity.      Right lower leg: No edema.      Left lower leg: No edema.   Lymphadenopathy:      Cervical: No cervical adenopathy.   Skin:     General: Skin is warm and dry.      Coloration: Skin is not jaundiced.      Findings: No bruising or rash. "   Neurological:      General: No focal deficit present.      Mental Status: He is alert and oriented to person, place, and time.      Gait: Gait normal.   Psychiatric:         Mood and Affect: Mood normal.         Behavior: Behavior normal.         Thought Content: Thought content normal.         Judgment: Judgment normal.     AIDAN Walton MD performed the physical exam on 5/14/2025 as documented above.    Lab Results - Last 18 Months   Lab Units 05/14/25  0848 01/13/25  0922 09/13/24  0852   WBC 10*3/mm3 4.58 5.62 4.13   HEMOGLOBIN g/dL 12.7* 11.9* 12.5*   HEMATOCRIT % 38.5 37.6 38.6   PLATELETS 10*3/mm3 150 227 205   MCV fL 87.5 89.1 88.7     Lab Results   Component Value Date    GLUCOSE 96 01/13/2025    BUN 31 (H) 01/13/2025    CREATININE 1.52 (H) 01/13/2025    EGFRIFNONA 73 02/16/2022    BCR 20.4 01/13/2025    K 4.5 01/13/2025    CO2 23.4 01/13/2025    CALCIUM 9.7 01/13/2025    ALBUMIN 4.3 01/13/2025    AST 20 01/13/2025    ALT 20 01/13/2025     Lab Results   Component Value Date    IRON 136 09/13/2024    TIBC 516 09/13/2024    FERRITIN 456.00 (H) 09/13/2024     LDH   Date Value Ref Range Status   01/13/2025 191 135 - 225 U/L Final     Uric Acid   Date Value Ref Range Status   12/06/2021 5.7 3.4 - 7.0 mg/dL Final     Lab Results   Component Value Date    SALAZAR Negative 12/06/2021     Lab Results   Component Value Date    HAPTOGLOBIN 91 09/13/2024     Lab Results   Component Value Date    PTT >139.0 (C) 06/05/2022    INR 1.06 06/02/2022     Assessment & Plan     1.  Low-grade follicular lymphoma status post 8 cycles of rituximab and bendamustine followed by maintenance rituximab.  Remains free of any suggestion of recurring disease.  2.  Normocytic anemia: Continues to improve.  No suggestion of a nutrient deficiency or hemolysis.  Will follow.  3.  Reviewed the the recent notes and all laboratory exams.  Discussed with him.  4.  Return to see me in approximately 6 months.    Gregg Walton MD on 5/14/2025 at  9:37 AM.

## 2025-05-14 ENCOUNTER — OFFICE VISIT (OUTPATIENT)
Dept: ONCOLOGY | Facility: CLINIC | Age: 75
End: 2025-05-14
Payer: COMMERCIAL

## 2025-05-14 ENCOUNTER — LAB (OUTPATIENT)
Dept: LAB | Facility: HOSPITAL | Age: 75
End: 2025-05-14
Payer: COMMERCIAL

## 2025-05-14 VITALS
BODY MASS INDEX: 26.52 KG/M2 | WEIGHT: 189.4 LBS | HEIGHT: 71 IN | HEART RATE: 77 BPM | OXYGEN SATURATION: 97 % | DIASTOLIC BLOOD PRESSURE: 80 MMHG | SYSTOLIC BLOOD PRESSURE: 145 MMHG | RESPIRATION RATE: 15 BRPM | TEMPERATURE: 98 F

## 2025-05-14 DIAGNOSIS — C82.90 FOLLICULAR NON-HODGKIN'S LYMPHOMA: Primary | ICD-10-CM

## 2025-05-14 LAB
ALBUMIN SERPL-MCNC: 4.6 G/DL (ref 3.5–5.2)
ALBUMIN/GLOB SERPL: 2 G/DL
ALP SERPL-CCNC: 40 U/L (ref 39–117)
ALT SERPL W P-5'-P-CCNC: 26 U/L (ref 1–41)
ANION GAP SERPL CALCULATED.3IONS-SCNC: 7.7 MMOL/L (ref 5–15)
AST SERPL-CCNC: 22 U/L (ref 1–40)
BASOPHILS # BLD AUTO: 0.02 10*3/MM3 (ref 0–0.2)
BASOPHILS NFR BLD AUTO: 0.4 % (ref 0–1.5)
BILIRUB SERPL-MCNC: 0.5 MG/DL (ref 0–1.2)
BUN SERPL-MCNC: 27 MG/DL (ref 8–23)
BUN/CREAT SERPL: 19.9 (ref 7–25)
CALCIUM SPEC-SCNC: 9.6 MG/DL (ref 8.6–10.5)
CHLORIDE SERPL-SCNC: 105 MMOL/L (ref 98–107)
CO2 SERPL-SCNC: 28.3 MMOL/L (ref 22–29)
CREAT SERPL-MCNC: 1.36 MG/DL (ref 0.76–1.27)
DEPRECATED RDW RBC AUTO: 42.3 FL (ref 37–54)
EGFRCR SERPLBLD CKD-EPI 2021: 55.3 ML/MIN/1.73
EOSINOPHIL # BLD AUTO: 0.01 10*3/MM3 (ref 0–0.4)
EOSINOPHIL NFR BLD AUTO: 0.2 % (ref 0.3–6.2)
ERYTHROCYTE [DISTWIDTH] IN BLOOD BY AUTOMATED COUNT: 13.6 % (ref 12.3–15.4)
GLOBULIN UR ELPH-MCNC: 2.3 GM/DL
GLUCOSE SERPL-MCNC: 97 MG/DL (ref 65–99)
HCT VFR BLD AUTO: 38.5 % (ref 37.5–51)
HGB BLD-MCNC: 12.7 G/DL (ref 13–17.7)
HOLD SPECIMEN: NORMAL
LDH SERPL-CCNC: 201 U/L (ref 135–225)
LYMPHOCYTES # BLD AUTO: 2.1 10*3/MM3 (ref 0.7–3.1)
LYMPHOCYTES NFR BLD AUTO: 45.9 % (ref 19.6–45.3)
MCH RBC QN AUTO: 28.9 PG (ref 26.6–33)
MCHC RBC AUTO-ENTMCNC: 33 G/DL (ref 31.5–35.7)
MCV RBC AUTO: 87.5 FL (ref 79–97)
MONOCYTES # BLD AUTO: 0.53 10*3/MM3 (ref 0.1–0.9)
MONOCYTES NFR BLD AUTO: 11.6 % (ref 5–12)
NEUTROPHILS NFR BLD AUTO: 1.92 10*3/MM3 (ref 1.7–7)
NEUTROPHILS NFR BLD AUTO: 41.9 % (ref 42.7–76)
PLATELET # BLD AUTO: 150 10*3/MM3 (ref 140–450)
PMV BLD AUTO: 10.4 FL (ref 6–12)
POTASSIUM SERPL-SCNC: 4.6 MMOL/L (ref 3.5–5.2)
PROT SERPL-MCNC: 6.9 G/DL (ref 6–8.5)
RBC # BLD AUTO: 4.4 10*6/MM3 (ref 4.14–5.8)
SODIUM SERPL-SCNC: 141 MMOL/L (ref 136–145)
WBC NRBC COR # BLD AUTO: 4.58 10*3/MM3 (ref 3.4–10.8)

## 2025-05-14 PROCEDURE — 99213 OFFICE O/P EST LOW 20 MIN: CPT | Performed by: INTERNAL MEDICINE

## 2025-05-14 PROCEDURE — 36415 COLL VENOUS BLD VENIPUNCTURE: CPT

## 2025-05-14 PROCEDURE — 85025 COMPLETE CBC W/AUTO DIFF WBC: CPT

## 2025-05-14 PROCEDURE — 80053 COMPREHEN METABOLIC PANEL: CPT | Performed by: PHYSICIAN ASSISTANT

## 2025-05-14 PROCEDURE — 83615 LACTATE (LD) (LDH) ENZYME: CPT | Performed by: PHYSICIAN ASSISTANT
